# Patient Record
Sex: MALE | Race: WHITE | NOT HISPANIC OR LATINO | Employment: FULL TIME | URBAN - METROPOLITAN AREA
[De-identification: names, ages, dates, MRNs, and addresses within clinical notes are randomized per-mention and may not be internally consistent; named-entity substitution may affect disease eponyms.]

---

## 2017-07-07 ENCOUNTER — OFFICE VISIT (OUTPATIENT)
Dept: INTERNAL MEDICINE | Facility: CLINIC | Age: 42
End: 2017-07-07
Payer: COMMERCIAL

## 2017-07-07 VITALS
RESPIRATION RATE: 16 BRPM | BODY MASS INDEX: 29.84 KG/M2 | WEIGHT: 201.5 LBS | HEART RATE: 102 BPM | SYSTOLIC BLOOD PRESSURE: 132 MMHG | OXYGEN SATURATION: 96 % | HEIGHT: 69 IN | TEMPERATURE: 99 F | DIASTOLIC BLOOD PRESSURE: 89 MMHG

## 2017-07-07 DIAGNOSIS — G89.4 CHRONIC PAIN SYNDROME: Chronic | ICD-10-CM

## 2017-07-07 DIAGNOSIS — S63.630A SPRAIN OF INTERPHALANGEAL JOINT OF RIGHT INDEX FINGER, INITIAL ENCOUNTER: ICD-10-CM

## 2017-07-07 PROCEDURE — 99214 OFFICE O/P EST MOD 30 MIN: CPT | Mod: S$GLB,,, | Performed by: FAMILY MEDICINE

## 2017-07-07 PROCEDURE — 3008F BODY MASS INDEX DOCD: CPT | Mod: S$GLB,,, | Performed by: FAMILY MEDICINE

## 2017-07-07 PROCEDURE — 99999 PR PBB SHADOW E&M-EST. PATIENT-LVL III: CPT | Mod: PBBFAC,,, | Performed by: FAMILY MEDICINE

## 2017-07-07 RX ORDER — ACETAMINOPHEN 500 MG
500 TABLET ORAL EVERY 6 HOURS PRN
Status: ON HOLD | COMMUNITY
End: 2018-09-21 | Stop reason: HOSPADM

## 2017-07-07 RX ORDER — MELOXICAM 15 MG/1
15 TABLET ORAL DAILY
COMMUNITY
Start: 2017-06-29 | End: 2017-12-28 | Stop reason: ALTCHOICE

## 2017-07-07 RX ORDER — PREGABALIN 200 MG/1
200 CAPSULE ORAL 3 TIMES DAILY
COMMUNITY
End: 2017-12-28 | Stop reason: ALTCHOICE

## 2017-07-07 RX ORDER — METOPROLOL TARTRATE 50 MG/1
50 TABLET ORAL 2 TIMES DAILY
Status: ON HOLD | COMMUNITY
End: 2020-01-31 | Stop reason: HOSPADM

## 2017-07-07 RX ORDER — IBUPROFEN 200 MG
200 TABLET ORAL EVERY 6 HOURS PRN
Status: ON HOLD | COMMUNITY
End: 2018-09-21 | Stop reason: HOSPADM

## 2017-07-07 NOTE — LETTER
[facility] - Internal Medicine  [address]  [place] [address]  Phone: [phone]  Fax: [phone]   Patient: Luke Coley   MR Number: [id number]   YOB: 1975   Date of Visit: 7/7/2017        REFERRAL TO PAIN MANAGEMENT SPECIALIST / PAIN MANAGMENT CLINIC    WHAT IS A PAIN MANAGEMENT SPECIALIST? A PAIN MANAGEMENT SPECIALIST is a doctor who diagnoses and treats chronic pain syndromes.     WHAT IS A PAIN MANAGEMENT CLINIC? A PAIN MANAGEMENT CLINIC is a medical practice (doctors office) that is primarily engaged in the treatment of pain by prescribing medications such as narcotics. Pain management clinics in [place] must be licensed by the [place] Department of Health and [facility].    According to most recent information I have been given from [facility], there are seven licensed Pain Management Clinics in [place]. They are (in alphabetical order):  · [facility] [address] [place] [address] PH: [phone]  · [facility] [address] [place] [address] PH: [phone]  · [facility] [address] [place] [address] PH: [phone]  · [facility] [address] [place] [address] PH: [phone]  · [facility] [place] [address] [address] PH: [phone]  · [facility] [address] [facility] [address] [place] [address] PH: [phone]  · [facility] [address] [place] [address] PH: [phone]    NOTE: THE ABOVE INFORMATION MAY NOT BE ACCURATE OR UP TO DATE. YOU SHOULD CONTACT [facility] OR THE INDIVIDUAL OFFICES TO VERIFY THE CORRECT INFORMATION.                                  PAIN MANAGEMENT SPECIALIST THAT ARE  NOT AT [facility] PAIN MANAGEMENT CLINICS INCLUDE:    · [facility]: [phone] [place] [address] -Dr. Varghese  · [facility] [place]: [phone] [facility] [place]  [address] -Dr. Wasserman  · [facility]: [phone] [place] [address] -Dr. Cadena  · [facility]: [phone] [address] [place] [address] -Dr. Pinto -Dr. MILAGRO Urbina -Dr. MILAGRO Ferguson -Dr. LUZ Ferguson -Dr. Nunes  · [facility]: [phone] [place] [address] -Dr. Kendrick -Dr. Mcneal  · [facility]: [phone] [place] [address] -Dr. Anderson -Dr. Nascimento -Dr. Patel -Dr. Rehman -Dr. Ham -Dr. Horne  · Dr. Altaf Bonner: [phone] [place] [address]  · Dr. Aldair Arnett: [phone] [place] [address]  · Dr. Keith Ramsey: [phone] [place] [address]  · Dr. Audi Pimentel: [phone] [place] [address]    NOTE: THE ABOVE INFORMATION MAY NOT BE ACCURATE OR UP TO DATE. YOU SHOULD CONTACT THE INDIVIDUAL OFFICES TO VERIFY THE CORRECT INFORMATION.    IMPORTANT INFORMATION:  It is medically necessary for you to see a pain management specialist for proper diagnosis and treatment, because I am unable to meet your pain management needs. It is very important that you call to schedule an appointment to see this specialist without delay.    Failing to see this specialist may result in a FAILURE TO DIAGNOSE or DELAY IN DIAGNOSIS of a SERIOUS MEDICAL CONDITION or a FAILURE TO TREAT, a DELAY IN TREATMENT, or IMPROPER TREATMENT of a SERIOUS MEDICAL CONDITION.    WHAT TO DO NOW:  Call the pain management specialist or pain management clinic of your choice to schedule an appointment as soon as possible.

## 2017-07-07 NOTE — ASSESSMENT & PLAN NOTE
This problem is NEW TO ME. Based on the report of the patient and information available to me at present, this condition REQUIRES FURTHER WORK-UP.

## 2017-08-10 NOTE — PROGRESS NOTES
"CHIEF COMPLAINT  Finger Injury      HISTORY OF PRESENT ILLNESS     Problem List Items Addressed This Visit     Sprain of interphalangeal joint of right index finger    Chronic pain syndrome (Chronic)      Other Visit Diagnoses    None.       PROBLEM/CONDITION: Finger pain  STATUS: This problem is NEW TO ME. Quality described as an aching pain. Location is distal interphalangeal joint of right index finger. Onset was a few days ago, after accidentally "jamming" his finger. Severity of pain described as moderate to moderately severe. Exacerbating factors include range of motion. Alleviating factors include meloxicam 15 mg. On exam his finger demonstrates no is no joint inflammation, deformity, instability, or apparent tendon dysfunction. Strength and gross sensation are intact. ROM is normal.  PLAN: We discussed differential diagnosis and treatment options. It was agreed to treat this problem with a neutral position finger splint (which he will obtain at pharmacy), and use the prescription medication meloxicam 15 mg daily as needed. (He understands he should not use over-the-counter NSAIDs concurrently with meloxicam.) He may use cool/cold packs as needed. If this fails to resolve the problem over the next couple of weeks, I recommend consultation with orthopedic surgeon/hand specialist.    PROBLEM/CONDITION:  Chronic pain syndrome  STATUS: This problem is NEW TO ME. He reports other chronic low back pain, which he relates to degenerative disc disease. He is requesting that I provide him pain management services. It is noted that he volunteered no history of recreational drug use or chemical dependency, and he denied such history on direct questioning. However, it is also noted that his recent emergency department records reflect recent hospitalization for polysubstance abuse.   PLAN: I explained to him that given the complexity of his chronic pain condition, he needed to be under the care of a chronic pain " "specialist, and he could discuss that further with his primary care physician.     REVIEW OF SYSTEMS  CONSTITUTIONAL: No fever reported.  CARDIOVASCULAR: No chest pain reported.  PULMONARY: No trouble breathing reported.     PHYSICAL EXAM  Vitals:    07/07/17 1325   BP: 132/89   BP Location: Right arm   Patient Position: Sitting   BP Method: Manual   Pulse: 102   Resp: 16   Temp: 99 °F (37.2 °C)   TempSrc: Oral   SpO2: 96%   Weight: 91.4 kg (201 lb 8 oz)   Height: 5' 9" (1.753 m)     CONSTITUTIONAL: Vital signs (BP, P, T, RR, et al) noted. No apparent distress. Does not appear acutely ill or septic. Appears adequately hydrated.  HEENT: External ENT grossly unremarkable. Hearing grossly intact. Oropharynx moist.  PULM: Lungs clear. Breathing unlabored.  HEART: Auscultation reveals regular rate and rhythm without murmur, gallop or rub.  DERM: Skin warm and moist with normal turgor.  NEURO: There are no gross focal motor deficits or gross deficits of cranial nerves III-XII.  PSYCHIATRIC: Alert and oriented x 3. Mood is grossly neutral. Affect appropriate. Judgment and insight not grossly compromised.  MUSCULOSKELETAL: Grossly normal stance and gait.     PAST MEDICAL HISTORY, FAMILY HISTORY, SOCIAL HISTORY, CURRENT MEDICATION LIST, and ALLERGY LIST reviewed by me (JAYSON Estrada MD) and are updated consistent with the patient's report.    ASSESSMENT and PLAN  Sprain of interphalangeal joint of right index finger, initial encounter    Chronic pain syndrome      Medication List with Changes/Refills   Current Medications    ACETAMINOPHEN (TYLENOL) 500 MG TABLET    Take 500 mg by mouth every 6 (six) hours as needed for Pain.    ALUMINUM-MAGNESIUM HYDROXIDE-SIMETHICONE (MAALOX) 200-200-20 MG/5 ML SUSP    Take 30 mLs by mouth daily as needed.    IBUPROFEN (ADVIL,MOTRIN) 200 MG TABLET    Take 200 mg by mouth every 6 (six) hours as needed for Pain.    MELOXICAM (MOBIC) 15 MG TABLET    Take 15 mg by mouth once " daily.    METOPROLOL TARTRATE (LOPRESSOR) 50 MG TABLET    Take 50 mg by mouth 2 (two) times daily.    PREGABALIN (LYRICA) 200 MG CAP    Take 200 mg by mouth 3 (three) times daily.   Discontinued Medications    ACETAMINOPHEN (TYLENOL) 325 MG TABLET    Take 325 mg by mouth every 6 (six) hours as needed for Pain. 2 TABLETS (650 MG) EVERY 6 HOURS PRN    CLONIDINE (CATAPRES) 0.1 MG TABLET    Take 0.1 mg by mouth 2 (two) times daily as needed. Twice daily prn for systolic blood pressure  <160/diastolic pressure >100 notify MD    HALOPERIDOL (HALDOL) 5 MG TABLET    Take 5 mg by mouth every 6 (six) hours as needed. Po or IM prn psychosis if pt refuses po may give IM    HYDROXYZINE PAMOATE (VISTARIL) 50 MG CAP    Take 50 mg by mouth every 8 (eight) hours as needed. Prn anxiety    IBUPROFEN (ADVIL,MOTRIN) 400 MG TABLET    Take 400 mg by mouth every 6 (six) hours as needed for Other.    LOPERAMIDE (IMODIUM) 2 MG CAPSULE    Take 2 mg by mouth 4 (four) times daily as needed for Diarrhea. 2 tablets for 1st loose stool followed by 1 tablet for each loose stool no more than 8 tablets in 24 hours    LORAZEPAM (ATIVAN) 1 MG TABLET    Take 1 mg by mouth every 6 (six) hours as needed for Anxiety.    MAGNESIUM HYDROXIDE 400 MG/5 ML (MILK OF MAGNESIA) 400 MG/5 ML SUSP    Take 30 mLs by mouth daily as needed.    ONDANSETRON (ZOFRAN) 4 MG TABLET    Take 8 mg by mouth every 8 (eight) hours as needed for Nausea. Prn nausea/vomiting    OXCARBAZEPINE (TRILEPTAL) 150 MG TAB    Take 150 mg by mouth 2 (two) times daily.    PREGABALIN (LYRICA) 300 MG CAP    Take 1 capsule (300 mg total) by mouth 2 (two) times daily.    QUETIAPINE (SEROQUEL) 100 MG TAB    Take 100 mg by mouth every evening.    TRAZODONE (DESYREL) 50 MG TABLET    Take 50 mg by mouth nightly as needed for Insomnia.    UNABLE TO FIND    every 6 (six) hours as needed. BENADRYL 50 MG PO,HALDOL 5 MG PO +ATIVAN 2MG EVERY 6 HOURS PRN PSYCHOSIS, IF PATIENT REFUSES MAY GIVE IM       Return  if symptoms worsen or fail to improve.    ABOUT THIS DOCUMENTATION:  1. The order of the conditions listed in the HPI is one of convenience and does not necessarily reflect the chronology of the appointment, nor the relative importance of a condition. It is possible that additional description or status details about condition(s) may be found elsewhere in the documentation for today's encounter.  2. Documentation entered by me for this encounter was done in part using speech-recognition technology. Although I have made an effort to ensure accuracy, malapropisms may exist and should be interpreted in context.                        -JAYSON Estrada MD    There are no Patient Instructions on file for this visit.

## 2017-12-28 ENCOUNTER — HOSPITAL ENCOUNTER (EMERGENCY)
Facility: HOSPITAL | Age: 42
Discharge: HOME | End: 2017-12-28
Attending: EMERGENCY MEDICINE
Payer: COMMERCIAL

## 2017-12-28 VITALS
HEIGHT: 71 IN | SYSTOLIC BLOOD PRESSURE: 129 MMHG | BODY MASS INDEX: 25.9 KG/M2 | OXYGEN SATURATION: 100 % | HEART RATE: 80 BPM | WEIGHT: 185 LBS | DIASTOLIC BLOOD PRESSURE: 87 MMHG | RESPIRATION RATE: 20 BRPM | TEMPERATURE: 98 F

## 2017-12-28 DIAGNOSIS — R45.851 SUICIDAL IDEATIONS: ICD-10-CM

## 2017-12-28 DIAGNOSIS — F10.10 ALCOHOL ABUSE: Primary | ICD-10-CM

## 2017-12-28 LAB
ALBUMIN SERPL BCP-MCNC: 3.6 G/DL
ALP SERPL-CCNC: 62 U/L
ALT SERPL W/O P-5'-P-CCNC: 87 U/L
AMPHET+METHAMPHET UR QL: NEGATIVE
ANION GAP SERPL CALC-SCNC: 14 MMOL/L
APAP SERPL-MCNC: <3 UG/ML
AST SERPL-CCNC: 69 U/L
BARBITURATES UR QL SCN>200 NG/ML: NEGATIVE
BASOPHILS # BLD AUTO: 0.01 K/UL
BASOPHILS NFR BLD: 0.1 %
BENZODIAZ UR QL SCN>200 NG/ML: NEGATIVE
BILIRUB SERPL-MCNC: 0.4 MG/DL
BILIRUB UR QL STRIP: NEGATIVE
BUN SERPL-MCNC: 12 MG/DL
BZE UR QL SCN: NEGATIVE
CALCIUM SERPL-MCNC: 9.4 MG/DL
CANNABINOIDS UR QL SCN: NEGATIVE
CHLORIDE SERPL-SCNC: 105 MMOL/L
CLARITY UR: CLEAR
CO2 SERPL-SCNC: 24 MMOL/L
COLOR UR: YELLOW
CREAT SERPL-MCNC: 0.8 MG/DL
CREAT UR-MCNC: 11.6 MG/DL
DIFFERENTIAL METHOD: ABNORMAL
EOSINOPHIL # BLD AUTO: 0 K/UL
EOSINOPHIL NFR BLD: 0.1 %
ERYTHROCYTE [DISTWIDTH] IN BLOOD BY AUTOMATED COUNT: 13.5 %
EST. GFR  (AFRICAN AMERICAN): >60 ML/MIN/1.73 M^2
EST. GFR  (NON AFRICAN AMERICAN): >60 ML/MIN/1.73 M^2
ETHANOL SERPL-MCNC: 58 MG/DL
GLUCOSE SERPL-MCNC: 100 MG/DL
GLUCOSE UR QL STRIP: NEGATIVE
HCT VFR BLD AUTO: 38 %
HGB BLD-MCNC: 13.6 G/DL
HGB UR QL STRIP: NEGATIVE
KETONES UR QL STRIP: NEGATIVE
LEUKOCYTE ESTERASE UR QL STRIP: NEGATIVE
LYMPHOCYTES # BLD AUTO: 1 K/UL
LYMPHOCYTES NFR BLD: 8.3 %
MCH RBC QN AUTO: 32.2 PG
MCHC RBC AUTO-ENTMCNC: 35.8 G/DL
MCV RBC AUTO: 90 FL
METHADONE UR QL SCN>300 NG/ML: NEGATIVE
MONOCYTES # BLD AUTO: 0.6 K/UL
MONOCYTES NFR BLD: 5.1 %
NEUTROPHILS # BLD AUTO: 10.7 K/UL
NEUTROPHILS NFR BLD: 86.4 %
NITRITE UR QL STRIP: NEGATIVE
OPIATES UR QL SCN: NEGATIVE
PCP UR QL SCN>25 NG/ML: NEGATIVE
PH UR STRIP: 7 [PH] (ref 5–8)
PLATELET # BLD AUTO: 134 K/UL
PMV BLD AUTO: 11.6 FL
POTASSIUM SERPL-SCNC: 4 MMOL/L
PROT SERPL-MCNC: 7.1 G/DL
PROT UR QL STRIP: NEGATIVE
RBC # BLD AUTO: 4.23 M/UL
SODIUM SERPL-SCNC: 143 MMOL/L
SP GR UR STRIP: <=1.005 (ref 1–1.03)
TOXICOLOGY INFORMATION: ABNORMAL
TSH SERPL DL<=0.005 MIU/L-ACNC: 0.88 UIU/ML
URN SPEC COLLECT METH UR: ABNORMAL
UROBILINOGEN UR STRIP-ACNC: NEGATIVE EU/DL
WBC # BLD AUTO: 12.36 K/UL

## 2017-12-28 PROCEDURE — 80307 DRUG TEST PRSMV CHEM ANLYZR: CPT

## 2017-12-28 PROCEDURE — 80320 DRUG SCREEN QUANTALCOHOLS: CPT

## 2017-12-28 PROCEDURE — 99285 EMERGENCY DEPT VISIT HI MDM: CPT

## 2017-12-28 PROCEDURE — 25000003 PHARM REV CODE 250: Performed by: EMERGENCY MEDICINE

## 2017-12-28 PROCEDURE — G0425 INPT/ED TELECONSULT30: HCPCS | Mod: GT,,, | Performed by: PSYCHIATRY & NEUROLOGY

## 2017-12-28 PROCEDURE — 85025 COMPLETE CBC W/AUTO DIFF WBC: CPT

## 2017-12-28 PROCEDURE — 80053 COMPREHEN METABOLIC PANEL: CPT

## 2017-12-28 PROCEDURE — 81003 URINALYSIS AUTO W/O SCOPE: CPT

## 2017-12-28 PROCEDURE — 80329 ANALGESICS NON-OPIOID 1 OR 2: CPT

## 2017-12-28 PROCEDURE — 84443 ASSAY THYROID STIM HORMONE: CPT

## 2017-12-28 RX ORDER — MIRTAZAPINE 15 MG/1
15 TABLET, FILM COATED ORAL NIGHTLY
Status: DISCONTINUED | OUTPATIENT
Start: 2017-12-28 | End: 2017-12-28 | Stop reason: HOSPADM

## 2017-12-28 RX ORDER — DULOXETIN HYDROCHLORIDE 60 MG/1
60 CAPSULE, DELAYED RELEASE ORAL
Status: ON HOLD | COMMUNITY
Start: 2017-11-22 | End: 2018-09-21 | Stop reason: HOSPADM

## 2017-12-28 RX ORDER — DULOXETIN HYDROCHLORIDE 30 MG/1
30 CAPSULE, DELAYED RELEASE ORAL 2 TIMES DAILY
Status: DISCONTINUED | OUTPATIENT
Start: 2017-12-28 | End: 2017-12-28 | Stop reason: HOSPADM

## 2017-12-28 RX ORDER — GABAPENTIN 100 MG/1
100 CAPSULE ORAL 3 TIMES DAILY
Status: DISCONTINUED | OUTPATIENT
Start: 2017-12-28 | End: 2017-12-28 | Stop reason: HOSPADM

## 2017-12-28 RX ORDER — DICYCLOMINE HYDROCHLORIDE 20 MG/1
20 TABLET ORAL
Status: ON HOLD | COMMUNITY
Start: 2017-11-22 | End: 2018-09-21 | Stop reason: HOSPADM

## 2017-12-28 RX ORDER — PREGABALIN 200 MG/1
200 CAPSULE ORAL
Status: ON HOLD | COMMUNITY
Start: 2017-11-22 | End: 2018-09-21 | Stop reason: HOSPADM

## 2017-12-28 RX ADMIN — DULOXETINE 30 MG: 30 CAPSULE, DELAYED RELEASE ORAL at 09:12

## 2017-12-28 RX ADMIN — GABAPENTIN 100 MG: 100 CAPSULE ORAL at 06:12

## 2017-12-28 RX ADMIN — GABAPENTIN 100 MG: 100 CAPSULE ORAL at 03:12

## 2017-12-28 NOTE — ED NOTES
Pt resting in bed, side rails up x 2, sitter at bedside with direct visualization of pt. Sitter filling out 15 minute flow sheet. NAD at this time. Will continue to monitor.
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
## 2017-12-28 NOTE — ED NOTES
Attempted to notify Leidy, patient's emergency contact of patient's admission to [facility]; no answer x 2; unable to leave message.

## 2017-12-28 NOTE — ED NOTES
Pt's room secured per protocol. Pt's belongings secured and pt placed in grey gown and yellow socks. Pt being directly monitored by payton Ventura at this time. Will continue to monitor pt.

## 2017-12-28 NOTE — ED NOTES
Pt's initial PEC was accidentally voided by copy stamp. Dr Poe had completed his shift and Dr. Land was now on for the ED doctor. He was notified and he then assessed the pt and a new valid PEC was done.

## 2017-12-28 NOTE — ED NOTES
Patient identifiers verified and correct for Luke Coley.    LOC: The patient is awake, alert and aware of environment with an appropriate affect, the patient is oriented x 3 and speaking appropriately.  APPEARANCE: Patient resting comfortably and in no acute distress, patient is clean and well groomed, patient's clothing is properly fastened.  SKIN: The skin is warm and dry, color consistent with ethnicity, patient has normal skin turgor and moist mucus membranes, skin intact, no breakdown or bruising noted.  MUSCULOSKELETAL: Patient moving all extremities spontaneously.  RESPIRATORY: Airway is open and patent, respirations are spontaneous.  CARDIAC: Patient has a normal rate, no periphreal edema noted, capillary refill < 3 seconds.  ABDOMEN: Soft and non tender to palpation.

## 2017-12-28 NOTE — ED NOTES
Pt belongings collected:  Blue back pack consist of paperwork, belt and phone   Large grey duffle bag consist of multiple clothing  Black bag has toiletries  Tennis shoes  Hat, pants, shirt, and jacket  Medications: robaxin,2 bottles of metoprolol,one empty bottle of lexapro
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
## 2017-12-29 NOTE — ED NOTES
Patient transferred from to [facility] by charge nurse at this time. Patient escorted out of facility with [employer] transporters and security.

## 2018-09-17 PROBLEM — F32.A DEPRESSION: Status: ACTIVE | Noted: 2018-09-17

## 2018-09-18 PROBLEM — F17.200 TOBACCO USE DISORDER: Status: ACTIVE | Noted: 2018-09-18

## 2018-09-18 PROBLEM — D64.9 ANEMIA: Status: ACTIVE | Noted: 2018-09-18

## 2018-09-18 PROBLEM — R74.8 ELEVATED LIVER ENZYMES: Status: ACTIVE | Noted: 2018-09-18

## 2020-01-24 ENCOUNTER — HOSPITAL ENCOUNTER (INPATIENT)
Facility: HOSPITAL | Age: 45
LOS: 7 days | Discharge: HOME | DRG: 064 | End: 2020-01-31
Attending: EMERGENCY MEDICINE | Admitting: PSYCHIATRY & NEUROLOGY
Payer: COMMERCIAL

## 2020-01-24 DIAGNOSIS — F10.939 ALCOHOL WITHDRAWAL SYNDROME WITH COMPLICATION: ICD-10-CM

## 2020-01-24 DIAGNOSIS — I63.411 EMBOLIC STROKE INVOLVING RIGHT MIDDLE CEREBRAL ARTERY: ICD-10-CM

## 2020-01-24 DIAGNOSIS — G93.6 CYTOTOXIC CEREBRAL EDEMA: ICD-10-CM

## 2020-01-24 DIAGNOSIS — R00.1 BRADYCARDIA: ICD-10-CM

## 2020-01-24 DIAGNOSIS — F19.94 SUBSTANCE INDUCED MOOD DISORDER: ICD-10-CM

## 2020-01-24 DIAGNOSIS — F41.1 GENERALIZED ANXIETY DISORDER: ICD-10-CM

## 2020-01-24 DIAGNOSIS — G44.1 OTHER VASCULAR HEADACHE: Primary | ICD-10-CM

## 2020-01-24 DIAGNOSIS — I63.9 STROKE: ICD-10-CM

## 2020-01-24 DIAGNOSIS — F10.20 ALCOHOL USE DISORDER, SEVERE, DEPENDENCE: ICD-10-CM

## 2020-01-24 DIAGNOSIS — F11.20 OPIOID USE DISORDER, SEVERE, ON MAINTENANCE THERAPY, DEPENDENCE: ICD-10-CM

## 2020-01-24 PROBLEM — R51.9 CEPHALALGIA: Status: ACTIVE | Noted: 2020-01-24

## 2020-01-24 PROBLEM — F19.10 DRUG ABUSE: Status: ACTIVE | Noted: 2020-01-24

## 2020-01-24 PROBLEM — I10 ESSENTIAL HYPERTENSION: Status: ACTIVE | Noted: 2020-01-24

## 2020-01-24 LAB
ALBUMIN SERPL BCP-MCNC: 4 G/DL (ref 3.5–5.2)
ALBUMIN SERPL BCP-MCNC: 4.2 G/DL (ref 3.5–5.2)
ALP SERPL-CCNC: 66 U/L (ref 55–135)
ALP SERPL-CCNC: 66 U/L (ref 55–135)
ALT SERPL W/O P-5'-P-CCNC: 55 U/L (ref 10–44)
ALT SERPL W/O P-5'-P-CCNC: 61 U/L (ref 10–44)
AMPHET+METHAMPHET UR QL: NEGATIVE
ANION GAP SERPL CALC-SCNC: 14 MMOL/L (ref 8–16)
ANION GAP SERPL CALC-SCNC: 9 MMOL/L (ref 8–16)
ASCENDING AORTA: 3.76 CM
AST SERPL-CCNC: 106 U/L (ref 10–40)
AST SERPL-CCNC: 122 U/L (ref 10–40)
AV INDEX (PROSTH): 1.08
AV MEAN GRADIENT: 5 MMHG
AV PEAK GRADIENT: 7 MMHG
AV VALVE AREA: 3.46 CM2
AV VELOCITY RATIO: 0.89
BACTERIA #/AREA URNS AUTO: NORMAL /HPF
BARBITURATES UR QL SCN>200 NG/ML: NORMAL
BASOPHILS # BLD AUTO: 0.02 K/UL (ref 0–0.2)
BASOPHILS NFR BLD: 0.1 % (ref 0–1.9)
BENZODIAZ UR QL SCN>200 NG/ML: NEGATIVE
BILIRUB SERPL-MCNC: 0.6 MG/DL (ref 0.1–1)
BILIRUB SERPL-MCNC: 0.8 MG/DL (ref 0.1–1)
BILIRUB UR QL STRIP: NEGATIVE
BSA FOR ECHO PROCEDURE: 1.98 M2
BUN SERPL-MCNC: 24 MG/DL (ref 6–20)
BUN SERPL-MCNC: 31 MG/DL (ref 6–20)
BZE UR QL SCN: NEGATIVE
CALCIUM SERPL-MCNC: 8.7 MG/DL (ref 8.7–10.5)
CALCIUM SERPL-MCNC: 9 MG/DL (ref 8.7–10.5)
CANNABINOIDS UR QL SCN: NEGATIVE
CHLORIDE SERPL-SCNC: 105 MMOL/L (ref 95–110)
CHLORIDE SERPL-SCNC: 99 MMOL/L (ref 95–110)
CHOLEST SERPL-MCNC: 128 MG/DL (ref 120–199)
CHOLEST/HDLC SERPL: 2.4 {RATIO} (ref 2–5)
CLARITY UR REFRACT.AUTO: CLEAR
CO2 SERPL-SCNC: 25 MMOL/L (ref 23–29)
CO2 SERPL-SCNC: 28 MMOL/L (ref 23–29)
COLOR UR AUTO: YELLOW
CREAT SERPL-MCNC: 1.6 MG/DL (ref 0.5–1.4)
CREAT SERPL-MCNC: 2.5 MG/DL (ref 0.5–1.4)
CREAT UR-MCNC: 78 MG/DL (ref 23–375)
CV ECHO LV RWT: 0.49 CM
DIFFERENTIAL METHOD: ABNORMAL
DOP CALC AO PEAK VEL: 1.28 M/S
DOP CALC AO VTI: 21.76 CM
DOP CALC LVOT AREA: 3.2 CM2
DOP CALC LVOT DIAMETER: 2.02 CM
DOP CALC LVOT PEAK VEL: 1.14 M/S
DOP CALC LVOT STROKE VOLUME: 75.24 CM3
DOP CALCLVOT PEAK VEL VTI: 23.49 CM
E WAVE DECELERATION TIME: 224.16 MSEC
E/A RATIO: 0.96
E/E' RATIO: 7.58 M/S
ECHO LV POSTERIOR WALL: 1.01 CM (ref 0.6–1.1)
EOSINOPHIL # BLD AUTO: 0 K/UL (ref 0–0.5)
EOSINOPHIL NFR BLD: 0 % (ref 0–8)
ERYTHROCYTE [DISTWIDTH] IN BLOOD BY AUTOMATED COUNT: 13.6 % (ref 11.5–14.5)
EST. GFR  (AFRICAN AMERICAN): 34.6 ML/MIN/1.73 M^2
EST. GFR  (AFRICAN AMERICAN): 59.3 ML/MIN/1.73 M^2
EST. GFR  (NON AFRICAN AMERICAN): 29.9 ML/MIN/1.73 M^2
EST. GFR  (NON AFRICAN AMERICAN): 51.3 ML/MIN/1.73 M^2
ESTIMATED AVG GLUCOSE: 103 MG/DL (ref 68–131)
ETHANOL SERPL-MCNC: <10 MG/DL
FRACTIONAL SHORTENING: 22 % (ref 28–44)
GLUCOSE SERPL-MCNC: 113 MG/DL (ref 70–110)
GLUCOSE SERPL-MCNC: 133 MG/DL (ref 70–110)
GLUCOSE UR QL STRIP: NEGATIVE
HBA1C MFR BLD HPLC: 5.2 % (ref 4–5.6)
HCT VFR BLD AUTO: 39.5 % (ref 40–54)
HDLC SERPL-MCNC: 53 MG/DL (ref 40–75)
HDLC SERPL: 41.4 % (ref 20–50)
HGB BLD-MCNC: 13.3 G/DL (ref 14–18)
HGB UR QL STRIP: ABNORMAL
HYALINE CASTS UR QL AUTO: 0 /LPF
IMM GRANULOCYTES # BLD AUTO: 0.04 K/UL (ref 0–0.04)
IMM GRANULOCYTES NFR BLD AUTO: 0.3 % (ref 0–0.5)
INR PPP: 1.1 (ref 0.8–1.2)
INTERVENTRICULAR SEPTUM: 0.75 CM (ref 0.6–1.1)
KETONES UR QL STRIP: NEGATIVE
LDLC SERPL CALC-MCNC: 54 MG/DL (ref 63–159)
LEFT ATRIUM SIZE: 4.28 CM
LEFT INTERNAL DIMENSION IN SYSTOLE: 3.22 CM (ref 2.1–4)
LEFT VENTRICLE DIASTOLIC VOLUME INDEX: 38.28 ML/M2
LEFT VENTRICLE DIASTOLIC VOLUME: 74.82 ML
LEFT VENTRICLE MASS INDEX: 57 G/M2
LEFT VENTRICLE SYSTOLIC VOLUME INDEX: 21.2 ML/M2
LEFT VENTRICLE SYSTOLIC VOLUME: 41.43 ML
LEFT VENTRICULAR INTERNAL DIMENSION IN DIASTOLE: 4.11 CM (ref 3.5–6)
LEFT VENTRICULAR MASS: 111.12 G
LEUKOCYTE ESTERASE UR QL STRIP: NEGATIVE
LV LATERAL E/E' RATIO: 6.55 M/S
LV SEPTAL E/E' RATIO: 9 M/S
LYMPHOCYTES # BLD AUTO: 0.9 K/UL (ref 1–4.8)
LYMPHOCYTES NFR BLD: 6.5 % (ref 18–48)
MCH RBC QN AUTO: 31.7 PG (ref 27–31)
MCHC RBC AUTO-ENTMCNC: 33.7 G/DL (ref 32–36)
MCV RBC AUTO: 94 FL (ref 82–98)
METHADONE UR QL SCN>300 NG/ML: NEGATIVE
MICROSCOPIC COMMENT: NORMAL
MONOCYTES # BLD AUTO: 0.5 K/UL (ref 0.3–1)
MONOCYTES NFR BLD: 3.5 % (ref 4–15)
MV PEAK A VEL: 0.75 M/S
MV PEAK E VEL: 0.72 M/S
NEUTROPHILS # BLD AUTO: 12.2 K/UL (ref 1.8–7.7)
NEUTROPHILS NFR BLD: 89.6 % (ref 38–73)
NITRITE UR QL STRIP: NEGATIVE
NONHDLC SERPL-MCNC: 75 MG/DL
NRBC BLD-RTO: 0 /100 WBC
OPIATES UR QL SCN: NORMAL
PCP UR QL SCN>25 NG/ML: NEGATIVE
PH UR STRIP: 5 [PH] (ref 5–8)
PLATELET # BLD AUTO: 127 K/UL (ref 150–350)
PLATELET BLD QL SMEAR: ABNORMAL
PMV BLD AUTO: 10.7 FL (ref 9.2–12.9)
POCT GLUCOSE: 111 MG/DL (ref 70–110)
POTASSIUM SERPL-SCNC: 4.1 MMOL/L (ref 3.5–5.1)
POTASSIUM SERPL-SCNC: 4.2 MMOL/L (ref 3.5–5.1)
PROT SERPL-MCNC: 6.7 G/DL (ref 6–8.4)
PROT SERPL-MCNC: 7.1 G/DL (ref 6–8.4)
PROT UR QL STRIP: ABNORMAL
PROTHROMBIN TIME: 11.3 SEC (ref 9–12.5)
RA PRESSURE: 3 MMHG
RBC # BLD AUTO: 4.2 M/UL (ref 4.6–6.2)
RBC #/AREA URNS AUTO: 4 /HPF (ref 0–4)
RIGHT VENTRICULAR END-DIASTOLIC DIMENSION: 4.2 CM
RV TISSUE DOPPLER FREE WALL SYSTOLIC VELOCITY 1 (APICAL 4 CHAMBER VIEW): 14 CM/S
SINUS: 3.04 CM
SODIUM SERPL-SCNC: 138 MMOL/L (ref 136–145)
SODIUM SERPL-SCNC: 142 MMOL/L (ref 136–145)
SP GR UR STRIP: 1.01 (ref 1–1.03)
SQUAMOUS #/AREA URNS AUTO: 0 /HPF
STJ: 3.7 CM
TDI LATERAL: 0.11 M/S
TDI SEPTAL: 0.08 M/S
TDI: 0.1 M/S
TOXICOLOGY INFORMATION: NORMAL
TRICUSPID ANNULAR PLANE SYSTOLIC EXCURSION: 2.53 CM
TRIGL SERPL-MCNC: 105 MG/DL (ref 30–150)
TSH SERPL DL<=0.005 MIU/L-ACNC: 0.62 UIU/ML (ref 0.4–4)
URN SPEC COLLECT METH UR: ABNORMAL
WBC # BLD AUTO: 13.61 K/UL (ref 3.9–12.7)
WBC #/AREA URNS AUTO: 2 /HPF (ref 0–5)

## 2020-01-24 PROCEDURE — 85025 COMPLETE CBC W/AUTO DIFF WBC: CPT

## 2020-01-24 PROCEDURE — 63600175 PHARM REV CODE 636 W HCPCS: Performed by: PSYCHIATRY & NEUROLOGY

## 2020-01-24 PROCEDURE — 63600175 PHARM REV CODE 636 W HCPCS: Performed by: EMERGENCY MEDICINE

## 2020-01-24 PROCEDURE — 63600175 PHARM REV CODE 636 W HCPCS: Performed by: NURSE PRACTITIONER

## 2020-01-24 PROCEDURE — 84443 ASSAY THYROID STIM HORMONE: CPT

## 2020-01-24 PROCEDURE — 25000003 PHARM REV CODE 250: Performed by: PHYSICIAN ASSISTANT

## 2020-01-24 PROCEDURE — 63600175 PHARM REV CODE 636 W HCPCS: Performed by: PHYSICIAN ASSISTANT

## 2020-01-24 PROCEDURE — 36415 COLL VENOUS BLD VENIPUNCTURE: CPT

## 2020-01-24 PROCEDURE — 99223 1ST HOSP IP/OBS HIGH 75: CPT | Mod: ,,, | Performed by: PSYCHIATRY & NEUROLOGY

## 2020-01-24 PROCEDURE — 99291 PR CRITICAL CARE, E/M 30-74 MINUTES: ICD-10-PCS | Mod: ,,, | Performed by: EMERGENCY MEDICINE

## 2020-01-24 PROCEDURE — 93010 EKG 12-LEAD: ICD-10-PCS | Mod: ,,, | Performed by: INTERNAL MEDICINE

## 2020-01-24 PROCEDURE — 25000003 PHARM REV CODE 250: Performed by: NURSE PRACTITIONER

## 2020-01-24 PROCEDURE — 93010 ELECTROCARDIOGRAM REPORT: CPT | Mod: ,,, | Performed by: INTERNAL MEDICINE

## 2020-01-24 PROCEDURE — 80320 DRUG SCREEN QUANTALCOHOLS: CPT

## 2020-01-24 PROCEDURE — 99291 CRITICAL CARE FIRST HOUR: CPT | Mod: 25

## 2020-01-24 PROCEDURE — 96375 TX/PRO/DX INJ NEW DRUG ADDON: CPT

## 2020-01-24 PROCEDURE — 96365 THER/PROPH/DIAG IV INF INIT: CPT

## 2020-01-24 PROCEDURE — 99223 PR INITIAL HOSPITAL CARE,LEVL III: ICD-10-PCS | Mod: ,,, | Performed by: PSYCHIATRY & NEUROLOGY

## 2020-01-24 PROCEDURE — 92610 EVALUATE SWALLOWING FUNCTION: CPT

## 2020-01-24 PROCEDURE — 82962 GLUCOSE BLOOD TEST: CPT

## 2020-01-24 PROCEDURE — 11000001 HC ACUTE MED/SURG PRIVATE ROOM

## 2020-01-24 PROCEDURE — 83036 HEMOGLOBIN GLYCOSYLATED A1C: CPT

## 2020-01-24 PROCEDURE — 25000003 PHARM REV CODE 250: Performed by: EMERGENCY MEDICINE

## 2020-01-24 PROCEDURE — 93005 ELECTROCARDIOGRAM TRACING: CPT

## 2020-01-24 PROCEDURE — 80053 COMPREHEN METABOLIC PANEL: CPT

## 2020-01-24 PROCEDURE — 80053 COMPREHEN METABOLIC PANEL: CPT | Mod: 91

## 2020-01-24 PROCEDURE — 81001 URINALYSIS AUTO W/SCOPE: CPT

## 2020-01-24 PROCEDURE — 80061 LIPID PANEL: CPT

## 2020-01-24 PROCEDURE — 99291 CRITICAL CARE FIRST HOUR: CPT | Mod: ,,, | Performed by: EMERGENCY MEDICINE

## 2020-01-24 PROCEDURE — 80307 DRUG TEST PRSMV CHEM ANLYZR: CPT

## 2020-01-24 PROCEDURE — 85610 PROTHROMBIN TIME: CPT

## 2020-01-24 RX ORDER — HEPARIN SODIUM 5000 [USP'U]/ML
5000 INJECTION, SOLUTION INTRAVENOUS; SUBCUTANEOUS EVERY 8 HOURS
Status: DISCONTINUED | OUTPATIENT
Start: 2020-01-24 | End: 2020-01-31 | Stop reason: HOSPADM

## 2020-01-24 RX ORDER — SODIUM CHLORIDE 9 MG/ML
INJECTION, SOLUTION INTRAVENOUS CONTINUOUS
Status: DISCONTINUED | OUTPATIENT
Start: 2020-01-24 | End: 2020-01-25

## 2020-01-24 RX ORDER — ACETAMINOPHEN 500 MG
500 TABLET ORAL EVERY 6 HOURS PRN
Status: DISCONTINUED | OUTPATIENT
Start: 2020-01-24 | End: 2020-01-24

## 2020-01-24 RX ORDER — LORAZEPAM 2 MG/ML
2 INJECTION INTRAMUSCULAR
Status: DISCONTINUED | OUTPATIENT
Start: 2020-01-24 | End: 2020-01-25

## 2020-01-24 RX ORDER — DIVALPROEX SODIUM 250 MG/1
500 TABLET, DELAYED RELEASE ORAL ONCE
Status: COMPLETED | OUTPATIENT
Start: 2020-01-24 | End: 2020-01-24

## 2020-01-24 RX ORDER — DEXAMETHASONE SODIUM PHOSPHATE 4 MG/ML
8 INJECTION, SOLUTION INTRA-ARTICULAR; INTRALESIONAL; INTRAMUSCULAR; INTRAVENOUS; SOFT TISSUE
Status: COMPLETED | OUTPATIENT
Start: 2020-01-24 | End: 2020-01-24

## 2020-01-24 RX ORDER — DULOXETIN HYDROCHLORIDE 30 MG/1
60 CAPSULE, DELAYED RELEASE ORAL 2 TIMES DAILY
Status: DISCONTINUED | OUTPATIENT
Start: 2020-01-24 | End: 2020-01-24

## 2020-01-24 RX ORDER — ASPIRIN 81 MG/1
81 TABLET ORAL DAILY
Status: DISCONTINUED | OUTPATIENT
Start: 2020-01-24 | End: 2020-01-24

## 2020-01-24 RX ORDER — GABAPENTIN 400 MG/1
800 CAPSULE ORAL 3 TIMES DAILY
Status: DISCONTINUED | OUTPATIENT
Start: 2020-01-24 | End: 2020-01-24

## 2020-01-24 RX ORDER — LAMOTRIGINE 100 MG/1
200 TABLET ORAL 2 TIMES DAILY
Status: DISCONTINUED | OUTPATIENT
Start: 2020-01-24 | End: 2020-01-24

## 2020-01-24 RX ORDER — KETOROLAC TROMETHAMINE 30 MG/ML
15 INJECTION, SOLUTION INTRAMUSCULAR; INTRAVENOUS
Status: COMPLETED | OUTPATIENT
Start: 2020-01-24 | End: 2020-01-24

## 2020-01-24 RX ORDER — ONDANSETRON 2 MG/ML
8 INJECTION INTRAMUSCULAR; INTRAVENOUS ONCE
Status: COMPLETED | OUTPATIENT
Start: 2020-01-24 | End: 2020-01-24

## 2020-01-24 RX ORDER — BUTALBITAL, ACETAMINOPHEN AND CAFFEINE 50; 325; 40 MG/1; MG/1; MG/1
1 TABLET ORAL ONCE
Status: COMPLETED | OUTPATIENT
Start: 2020-01-24 | End: 2020-01-24

## 2020-01-24 RX ORDER — THIAMINE HCL 100 MG
100 TABLET ORAL DAILY
Status: DISCONTINUED | OUTPATIENT
Start: 2020-01-24 | End: 2020-01-31 | Stop reason: HOSPADM

## 2020-01-24 RX ORDER — BUPROPION HYDROCHLORIDE 300 MG/1
300 TABLET ORAL DAILY
Status: DISCONTINUED | OUTPATIENT
Start: 2020-01-25 | End: 2020-01-31 | Stop reason: HOSPADM

## 2020-01-24 RX ORDER — ATORVASTATIN CALCIUM 10 MG/1
40 TABLET, FILM COATED ORAL DAILY
Status: DISCONTINUED | OUTPATIENT
Start: 2020-01-24 | End: 2020-01-24

## 2020-01-24 RX ORDER — DIAZEPAM 2 MG/1
2 TABLET ORAL ONCE
Status: COMPLETED | OUTPATIENT
Start: 2020-01-24 | End: 2020-01-24

## 2020-01-24 RX ORDER — ACETAMINOPHEN 500 MG
500 TABLET ORAL EVERY 4 HOURS PRN
Status: DISCONTINUED | OUTPATIENT
Start: 2020-01-24 | End: 2020-01-31 | Stop reason: HOSPADM

## 2020-01-24 RX ORDER — DIPHENHYDRAMINE HYDROCHLORIDE 50 MG/ML
12.5 INJECTION INTRAMUSCULAR; INTRAVENOUS
Status: COMPLETED | OUTPATIENT
Start: 2020-01-24 | End: 2020-01-24

## 2020-01-24 RX ORDER — DEXAMETHASONE SODIUM PHOSPHATE 4 MG/ML
8 INJECTION, SOLUTION INTRA-ARTICULAR; INTRALESIONAL; INTRAMUSCULAR; INTRAVENOUS; SOFT TISSUE
Status: DISCONTINUED | OUTPATIENT
Start: 2020-01-24 | End: 2020-01-24

## 2020-01-24 RX ORDER — MAGNESIUM SULFATE HEPTAHYDRATE 40 MG/ML
2 INJECTION, SOLUTION INTRAVENOUS
Status: COMPLETED | OUTPATIENT
Start: 2020-01-24 | End: 2020-01-24

## 2020-01-24 RX ORDER — SODIUM CHLORIDE 0.9 % (FLUSH) 0.9 %
10 SYRINGE (ML) INJECTION
Status: DISCONTINUED | OUTPATIENT
Start: 2020-01-24 | End: 2020-01-31 | Stop reason: HOSPADM

## 2020-01-24 RX ORDER — ASPIRIN 325 MG
325 TABLET ORAL DAILY
Status: DISCONTINUED | OUTPATIENT
Start: 2020-01-24 | End: 2020-01-31 | Stop reason: HOSPADM

## 2020-01-24 RX ORDER — DIAZEPAM 5 MG/1
5 TABLET ORAL EVERY 6 HOURS PRN
Status: DISCONTINUED | OUTPATIENT
Start: 2020-01-24 | End: 2020-01-25

## 2020-01-24 RX ORDER — METOCLOPRAMIDE HYDROCHLORIDE 5 MG/ML
10 INJECTION INTRAMUSCULAR; INTRAVENOUS
Status: COMPLETED | OUTPATIENT
Start: 2020-01-24 | End: 2020-01-24

## 2020-01-24 RX ORDER — OLANZAPINE 2.5 MG/1
10 TABLET ORAL NIGHTLY
Status: DISCONTINUED | OUTPATIENT
Start: 2020-01-24 | End: 2020-01-27

## 2020-01-24 RX ORDER — LABETALOL HCL 20 MG/4 ML
10 SYRINGE (ML) INTRAVENOUS
Status: DISCONTINUED | OUTPATIENT
Start: 2020-01-24 | End: 2020-01-31 | Stop reason: HOSPADM

## 2020-01-24 RX ORDER — DULOXETIN HYDROCHLORIDE 30 MG/1
60 CAPSULE, DELAYED RELEASE ORAL DAILY
Status: DISCONTINUED | OUTPATIENT
Start: 2020-01-25 | End: 2020-01-31 | Stop reason: HOSPADM

## 2020-01-24 RX ORDER — PREGABALIN 150 MG/1
300 CAPSULE ORAL 2 TIMES DAILY
Status: DISCONTINUED | OUTPATIENT
Start: 2020-01-24 | End: 2020-01-31 | Stop reason: HOSPADM

## 2020-01-24 RX ORDER — BUTALBITAL, ACETAMINOPHEN AND CAFFEINE 50; 325; 40 MG/1; MG/1; MG/1
1 TABLET ORAL
Status: COMPLETED | OUTPATIENT
Start: 2020-01-24 | End: 2020-01-24

## 2020-01-24 RX ORDER — MIRTAZAPINE 15 MG/1
15 TABLET, FILM COATED ORAL NIGHTLY
Status: DISCONTINUED | OUTPATIENT
Start: 2020-01-24 | End: 2020-01-24

## 2020-01-24 RX ADMIN — ACETAMINOPHEN 500 MG: 500 TABLET ORAL at 09:01

## 2020-01-24 RX ADMIN — ONDANSETRON 8 MG: 2 INJECTION INTRAMUSCULAR; INTRAVENOUS at 04:01

## 2020-01-24 RX ADMIN — ACETAMINOPHEN 500 MG: 500 TABLET ORAL at 04:01

## 2020-01-24 RX ADMIN — DIVALPROEX SODIUM 500 MG: 250 TABLET, DELAYED RELEASE ORAL at 06:01

## 2020-01-24 RX ADMIN — Medication 100 MG: at 11:01

## 2020-01-24 RX ADMIN — BUTALBITAL, ACETAMINOPHEN AND CAFFEINE 1 TABLET: 50; 325; 40 TABLET ORAL at 09:01

## 2020-01-24 RX ADMIN — BUTALBITAL, ACETAMINOPHEN AND CAFFEINE 1 TABLET: 50; 325; 40 TABLET ORAL at 03:01

## 2020-01-24 RX ADMIN — ASPIRIN 325 MG ORAL TABLET 325 MG: 325 PILL ORAL at 01:01

## 2020-01-24 RX ADMIN — SODIUM CHLORIDE: 0.9 INJECTION, SOLUTION INTRAVENOUS at 03:01

## 2020-01-24 RX ADMIN — METOCLOPRAMIDE 10 MG: 5 INJECTION, SOLUTION INTRAMUSCULAR; INTRAVENOUS at 06:01

## 2020-01-24 RX ADMIN — MAGNESIUM SULFATE IN WATER 2 G: 40 INJECTION, SOLUTION INTRAVENOUS at 11:01

## 2020-01-24 RX ADMIN — FOLIC ACID: 5 INJECTION, SOLUTION INTRAMUSCULAR; INTRAVENOUS; SUBCUTANEOUS at 11:01

## 2020-01-24 RX ADMIN — HEPARIN SODIUM 5000 UNITS: 5000 INJECTION INTRAVENOUS; SUBCUTANEOUS at 09:01

## 2020-01-24 RX ADMIN — DIAZEPAM 2 MG: 2 TABLET ORAL at 06:01

## 2020-01-24 RX ADMIN — DIPHENHYDRAMINE HYDROCHLORIDE 12.5 MG: 50 INJECTION, SOLUTION INTRAMUSCULAR; INTRAVENOUS at 06:01

## 2020-01-24 RX ADMIN — GABAPENTIN 800 MG: 400 CAPSULE ORAL at 02:01

## 2020-01-24 RX ADMIN — KETOROLAC TROMETHAMINE 15 MG: 30 INJECTION, SOLUTION INTRAMUSCULAR; INTRAVENOUS at 11:01

## 2020-01-24 RX ADMIN — HEPARIN SODIUM 5000 UNITS: 5000 INJECTION INTRAVENOUS; SUBCUTANEOUS at 01:01

## 2020-01-24 RX ADMIN — DEXAMETHASONE SODIUM PHOSPHATE 8 MG: 4 INJECTION, SOLUTION INTRA-ARTICULAR; INTRALESIONAL; INTRAMUSCULAR; INTRAVENOUS; SOFT TISSUE at 11:01

## 2020-01-24 RX ADMIN — OLANZAPINE 10 MG: 2.5 TABLET, FILM COATED ORAL at 08:01

## 2020-01-24 RX ADMIN — LORAZEPAM 2 MG: 2 INJECTION, SOLUTION INTRAMUSCULAR; INTRAVENOUS at 09:01

## 2020-01-24 RX ADMIN — PREGABALIN 300 MG: 150 CAPSULE ORAL at 08:01

## 2020-01-24 NOTE — ASSESSMENT & PLAN NOTE
Stroke risk factor  Patient on amlodipine at home  -170 on arrival  Holding home BP meds  SBP <220  Labetalol prn

## 2020-01-24 NOTE — ASSESSMENT & PLAN NOTE
Patient given multiple medications by ED staff for headache including, Fioricet, dexamethasone, benadryl, remeron, and ketorolac  Will monitor response and consider other agents if necessary

## 2020-01-24 NOTE — ASSESSMENT & PLAN NOTE
Stroke risk factor  Patient reports snorting small amount of drugs last night he believes was heroine  Tox positive for barbiturates and opiates  History of psychiatry hospitalization and alcohol/drug rehab in 2018

## 2020-01-24 NOTE — NURSING
Allison Gallegos made aware patients pain persists at 10/10.  Charge nurse Berna made aware of previous efforts and interventions performed to help with patients pain.

## 2020-01-24 NOTE — HPI
Luke Coley is a 44 y.o. male medical history of HTN, fibromyalgia, alcohol and drug abuse, suicidal ideation, elevated liver enzymes and bipolar disorder who presented to ED intoxicated.  Patient stated he was out with friends last night drinking alcohol and snorted a small amount of drugs (possibly heroin).  When asked about where he slept for the night, patient states he was looking for his car all night. He presented to the emergency room around 6:00 a.m. this morning.  Currently, the patient is complaining of confusion, dizziness, headache, left facial numbness, and left-sided numbness.  Due to complaints of headache, ED physician ordered CT head which revealed right parietal infarct, stroke team consult. Patient states he drinks once per week but chart review indicates that he has been to rehab for alcohol/drug abuse in 2018.

## 2020-01-24 NOTE — PLAN OF CARE
Problem: SLP Goal  Goal: SLP Goal  Description  Speech Language Pathology Goals  Goals expected to be met by 1/31/2020  1. Pt will tolerate a regular diet with thin liquids w/o overt S/S aspiration, I'ly  2. Pt will participate in further, objective assessment of cognitive-linguistic skills to determine ongoing POC needs  3. Educate Pt and family on S/S aspiration and aspiration precautions      Outcome: Ongoing, Progressing     SLP Bedside Swallow Study completed. No overt S/S aspiration seen with trials presented. Pt with c/o intense 10/10 headache.  REC: regular diet with thin liquids, medications whole ok, provided standard aspiration precautions. ST to follow up to complete cognitive-linguistic assessment to determine ongoing POC needs.  RN and MD team notified of findings and Pt c/o headache pain.    NED Haskins., [facility]-SLP  Speech-Language Pathology  Pager: [phone]  1/24/2020

## 2020-01-24 NOTE — ASSESSMENT & PLAN NOTE
Luke Coley is a 44 y.o. male medical history of HTN, fibromyalgia, alcohol abuse, suicidal ideation, elevated liver enzymes and bipolar disorder who presented to ED intoxicated. CT ordered in ED due to complaints of HA. CT with R parietal infarct. Admitting patient to stroke unit for further workup. Etiology unclear, currently ESUS.    Antithrombotics for secondary stroke prevention: Antiplatelets: Aspirin: 325 mg daily    Statins for secondary stroke prevention and hyperlipidemia, if present:   Statins: None: Reason: elevated liver enzymes    Aggressive risk factor modification: HTN, Smoking, alcohol and drug use     Rehab efforts: The patient has been evaluated by a stroke team provider and the therapy needs have been fully considered based off the presenting complaints and exam findings. The following therapy evaluations are needed: PT evaluate and treat, OT evaluate and treat, SLP evaluate and treat, PM&R evaluate for appropriate placement    Diagnostics ordered/pending: HgbA1C to assess blood glucose levels, MRA head to assess vasculature, MRA neck/arch to assess vasculature, MRI head without contrast to assess brain parenchyma, TTE to assess cardiac function/status     VTE prophylaxis: Heparin 5000 units SQ every 8 hours    BP parameters: Infarct: No intervention, SBP <220
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
## 2020-01-24 NOTE — ED NOTES
"LOC: The patient is awake, alert, and oriented to place, time, situation.  Speech is appropriate and clear.     APPEARANCE: Patient moving/grabbing head stating, "This is a bad migraine." Pt in no acute distress. Patient is clean and well groomed. Pt arrived with 1000ml bolus of NS per EMS.     SKIN: The skin is warm and dry; color consistent with ethnicity.  Patient has normal skin turgor and moist mucus membranes.  Skin intact; no breakdown or bruising noted.     MUSCULOSKELETAL: Patient moving upper and lower extremities without difficulty.  Denies weakness.     RESPIRATORY: Airway is open and patent. Respirations spontaneous, even, easy, and non-labored.  Patient has a normal effort and rate.  No accessory muscle use noted. Denies cough.     CARDIAC:  Normal rhythm and rate noted.  No peripheral edema noted. No complaints of chest pain.      ABDOMEN: Soft and non tender to palpation.  No distention noted. +Nausea     NEUROLOGIC: Eyes open spontaneously.  Behavior appropriate to situation.  Follows commands; facial expression symmetrical.  Purposeful motor response noted; normal sensation in all extremities. Pupils 4mm, brisk/ equal, round, reactive to light.        "

## 2020-01-24 NOTE — ASSESSMENT & PLAN NOTE
Area of cytotoxic cerebral edema identified when reviewing brain imaging in the territory of the R middle cerebral artery. There is mass effect associated with it. We will continue to monitor the patients clinical exam for any worsening of symptoms which may indicate expansion of the stroke or the area of the edema resulting in the clinical change. The pattern is suggestive of ESUS etiology.

## 2020-01-24 NOTE — NURSING
Pt. Identified via spelling of name and date of birth. Bubble study x 3 via 20 g in right ac performed with and without valsalva maneuver.   Tolerated procedure well.

## 2020-01-24 NOTE — ED TRIAGE NOTES
"Pt brought by EMS with c/o a severe migraine, nausea, and visual changes. Pt reports hx of migraines, and states "its a bad migraine." Pt recently on suboxone.   "

## 2020-01-24 NOTE — ASSESSMENT & PLAN NOTE
Presented to ED intoxicated  Monitoring for signs and symptoms of withdrawal  CIWA ordered  Will order PRN benzos if needed
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
## 2020-01-25 PROBLEM — G25.9: Status: ACTIVE | Noted: 2020-01-25

## 2020-01-25 LAB
ALBUMIN SERPL BCP-MCNC: 3.7 G/DL (ref 3.5–5.2)
ALP SERPL-CCNC: 60 U/L (ref 55–135)
ALT SERPL W/O P-5'-P-CCNC: 48 U/L (ref 10–44)
ANION GAP SERPL CALC-SCNC: 9 MMOL/L (ref 8–16)
APTT BLDCRRT: 29.3 SEC (ref 21–32)
AST SERPL-CCNC: 89 U/L (ref 10–40)
BASOPHILS # BLD AUTO: 0.01 K/UL (ref 0–0.2)
BASOPHILS NFR BLD: 0.1 % (ref 0–1.9)
BILIRUB SERPL-MCNC: 0.9 MG/DL (ref 0.1–1)
BUN SERPL-MCNC: 18 MG/DL (ref 6–20)
CALCIUM SERPL-MCNC: 8.4 MG/DL (ref 8.7–10.5)
CHLORIDE SERPL-SCNC: 108 MMOL/L (ref 95–110)
CK MB SERPL-MCNC: 10.3 NG/ML (ref 0.1–6.5)
CK MB SERPL-RTO: 0.3 % (ref 0–5)
CK SERPL-CCNC: 3206 U/L (ref 20–200)
CO2 SERPL-SCNC: 25 MMOL/L (ref 23–29)
CREAT SERPL-MCNC: 1.2 MG/DL (ref 0.5–1.4)
DIFFERENTIAL METHOD: ABNORMAL
EOSINOPHIL # BLD AUTO: 0 K/UL (ref 0–0.5)
EOSINOPHIL NFR BLD: 0.1 % (ref 0–8)
ERYTHROCYTE [DISTWIDTH] IN BLOOD BY AUTOMATED COUNT: 13.7 % (ref 11.5–14.5)
EST. GFR  (AFRICAN AMERICAN): >60 ML/MIN/1.73 M^2
EST. GFR  (NON AFRICAN AMERICAN): >60 ML/MIN/1.73 M^2
GLUCOSE SERPL-MCNC: 107 MG/DL (ref 70–110)
HCT VFR BLD AUTO: 35.9 % (ref 40–54)
HGB BLD-MCNC: 11.9 G/DL (ref 14–18)
IMM GRANULOCYTES # BLD AUTO: 0.04 K/UL (ref 0–0.04)
IMM GRANULOCYTES NFR BLD AUTO: 0.4 % (ref 0–0.5)
INR PPP: 1 (ref 0.8–1.2)
LYMPHOCYTES # BLD AUTO: 1.8 K/UL (ref 1–4.8)
LYMPHOCYTES NFR BLD: 18.3 % (ref 18–48)
MAGNESIUM SERPL-MCNC: 2.4 MG/DL (ref 1.6–2.6)
MCH RBC QN AUTO: 31.2 PG (ref 27–31)
MCHC RBC AUTO-ENTMCNC: 33.1 G/DL (ref 32–36)
MCV RBC AUTO: 94 FL (ref 82–98)
MONOCYTES # BLD AUTO: 0.6 K/UL (ref 0.3–1)
MONOCYTES NFR BLD: 5.6 % (ref 4–15)
NEUTROPHILS # BLD AUTO: 7.6 K/UL (ref 1.8–7.7)
NEUTROPHILS NFR BLD: 75.5 % (ref 38–73)
NRBC BLD-RTO: 0 /100 WBC
PHOSPHATE SERPL-MCNC: 2.2 MG/DL (ref 2.7–4.5)
PLATELET # BLD AUTO: 134 K/UL (ref 150–350)
PMV BLD AUTO: 11 FL (ref 9.2–12.9)
POTASSIUM SERPL-SCNC: 3.9 MMOL/L (ref 3.5–5.1)
PROT SERPL-MCNC: 6.3 G/DL (ref 6–8.4)
PROTHROMBIN TIME: 10.7 SEC (ref 9–12.5)
RBC # BLD AUTO: 3.82 M/UL (ref 4.6–6.2)
SODIUM SERPL-SCNC: 142 MMOL/L (ref 136–145)
TROPONIN I SERPL DL<=0.01 NG/ML-MCNC: 0.12 NG/ML (ref 0–0.03)
WBC # BLD AUTO: 10.02 K/UL (ref 3.9–12.7)

## 2020-01-25 PROCEDURE — 90792 PSYCH DIAG EVAL W/MED SRVCS: CPT | Mod: ,,, | Performed by: PSYCHIATRY & NEUROLOGY

## 2020-01-25 PROCEDURE — 25000003 PHARM REV CODE 250: Performed by: PHYSICIAN ASSISTANT

## 2020-01-25 PROCEDURE — 83735 ASSAY OF MAGNESIUM: CPT

## 2020-01-25 PROCEDURE — 85025 COMPLETE CBC W/AUTO DIFF WBC: CPT

## 2020-01-25 PROCEDURE — 63600175 PHARM REV CODE 636 W HCPCS: Performed by: PHYSICIAN ASSISTANT

## 2020-01-25 PROCEDURE — 97530 THERAPEUTIC ACTIVITIES: CPT

## 2020-01-25 PROCEDURE — 36415 COLL VENOUS BLD VENIPUNCTURE: CPT

## 2020-01-25 PROCEDURE — 11000001 HC ACUTE MED/SURG PRIVATE ROOM

## 2020-01-25 PROCEDURE — 90792 PR PSYCHIATRIC DIAGNOSTIC EVALUATION W/MEDICAL SERVICES: ICD-10-PCS | Mod: ,,, | Performed by: PSYCHIATRY & NEUROLOGY

## 2020-01-25 PROCEDURE — 82550 ASSAY OF CK (CPK): CPT

## 2020-01-25 PROCEDURE — 85610 PROTHROMBIN TIME: CPT

## 2020-01-25 PROCEDURE — 84484 ASSAY OF TROPONIN QUANT: CPT

## 2020-01-25 PROCEDURE — 99233 SBSQ HOSP IP/OBS HIGH 50: CPT | Mod: ,,, | Performed by: PSYCHIATRY & NEUROLOGY

## 2020-01-25 PROCEDURE — 80053 COMPREHEN METABOLIC PANEL: CPT

## 2020-01-25 PROCEDURE — 97535 SELF CARE MNGMENT TRAINING: CPT

## 2020-01-25 PROCEDURE — 25000003 PHARM REV CODE 250: Performed by: NURSE PRACTITIONER

## 2020-01-25 PROCEDURE — 97165 OT EVAL LOW COMPLEX 30 MIN: CPT

## 2020-01-25 PROCEDURE — 99233 PR SUBSEQUENT HOSPITAL CARE,LEVL III: ICD-10-PCS | Mod: ,,, | Performed by: PSYCHIATRY & NEUROLOGY

## 2020-01-25 PROCEDURE — 25500020 PHARM REV CODE 255: Performed by: PSYCHIATRY & NEUROLOGY

## 2020-01-25 PROCEDURE — 82553 CREATINE MB FRACTION: CPT

## 2020-01-25 PROCEDURE — 85730 THROMBOPLASTIN TIME PARTIAL: CPT

## 2020-01-25 PROCEDURE — 84100 ASSAY OF PHOSPHORUS: CPT

## 2020-01-25 RX ORDER — DIAZEPAM 5 MG/1
10 TABLET ORAL EVERY 6 HOURS
Status: DISCONTINUED | OUTPATIENT
Start: 2020-01-25 | End: 2020-01-26

## 2020-01-25 RX ORDER — LORAZEPAM 2 MG/ML
2 INJECTION INTRAMUSCULAR
Status: DISCONTINUED | OUTPATIENT
Start: 2020-01-25 | End: 2020-01-27

## 2020-01-25 RX ORDER — BUPRENORPHINE AND NALOXONE 2; .5 MG/1; MG/1
1 FILM, SOLUBLE BUCCAL; SUBLINGUAL 2 TIMES DAILY
Status: DISCONTINUED | OUTPATIENT
Start: 2020-01-25 | End: 2020-01-27

## 2020-01-25 RX ADMIN — BUPROPION HYDROCHLORIDE 300 MG: 300 TABLET, FILM COATED, EXTENDED RELEASE ORAL at 07:01

## 2020-01-25 RX ADMIN — HEPARIN SODIUM 5000 UNITS: 5000 INJECTION INTRAVENOUS; SUBCUTANEOUS at 01:01

## 2020-01-25 RX ADMIN — DIAZEPAM 10 MG: 5 TABLET ORAL at 08:01

## 2020-01-25 RX ADMIN — SODIUM CHLORIDE: 0.9 INJECTION, SOLUTION INTRAVENOUS at 05:01

## 2020-01-25 RX ADMIN — DIAZEPAM 5 MG: 5 TABLET ORAL at 07:01

## 2020-01-25 RX ADMIN — PREGABALIN 300 MG: 150 CAPSULE ORAL at 07:01

## 2020-01-25 RX ADMIN — HEPARIN SODIUM 5000 UNITS: 5000 INJECTION INTRAVENOUS; SUBCUTANEOUS at 05:01

## 2020-01-25 RX ADMIN — ASPIRIN 325 MG ORAL TABLET 325 MG: 325 PILL ORAL at 07:01

## 2020-01-25 RX ADMIN — BUPRENORPHINE HYDROCHLORIDE, NALOXONE HYDROCHLORIDE 1 FILM: 2; .5 FILM, SOLUBLE BUCCAL; SUBLINGUAL at 07:01

## 2020-01-25 RX ADMIN — ACETAMINOPHEN 500 MG: 500 TABLET ORAL at 01:01

## 2020-01-25 RX ADMIN — LORAZEPAM 2 MG: 2 INJECTION, SOLUTION INTRAMUSCULAR; INTRAVENOUS at 09:01

## 2020-01-25 RX ADMIN — HEPARIN SODIUM 5000 UNITS: 5000 INJECTION INTRAVENOUS; SUBCUTANEOUS at 11:01

## 2020-01-25 RX ADMIN — ACETAMINOPHEN 500 MG: 500 TABLET ORAL at 07:01

## 2020-01-25 RX ADMIN — Medication 100 MG: at 07:01

## 2020-01-25 RX ADMIN — DIAZEPAM 5 MG: 5 TABLET ORAL at 01:01

## 2020-01-25 RX ADMIN — IOHEXOL 100 ML: 350 INJECTION, SOLUTION INTRAVENOUS at 06:01

## 2020-01-25 RX ADMIN — DULOXETINE HYDROCHLORIDE 60 MG: 30 CAPSULE, DELAYED RELEASE ORAL at 07:01

## 2020-01-25 NOTE — PLAN OF CARE
Problem: Fall Injury Risk  Goal: Absence of Fall and Fall-Related Injury  Outcome: Ongoing, Progressing     Problem: Adjustment to Illness (Stroke, Ischemic/Transient Ischemic Attack)  Goal: Optimal Coping  Outcome: Ongoing, Progressing     Problem: Pain (Stroke, Ischemic/Transient Ischemic Attack)  Goal: Acceptable Pain Control  Outcome: Ongoing, Progressing     Problem: Infection  Goal: Infection Symptom Resolution  Outcome: Ongoing, Progressing

## 2020-01-25 NOTE — ASSESSMENT & PLAN NOTE
Presented to ED intoxicated  Monitoring for signs and symptoms of withdrawal  CIWA ordered  Banana bag administered  Continue thiamine  PRN diazepam and ativan  Psychiatry consulted

## 2020-01-25 NOTE — ASSESSMENT & PLAN NOTE
Pt endorses daily intake of 1/5th whiskey daily with history of withdrawal complications including DTs/seizures. Pt has been receiving PRNs with parameters for withdrawal s/sx. Pt reports worsening withdrawal symptoms.    - Interested in rehab, ongoing  for cessation  - Recommend scheduling Valium for withdrawal s/sx:  Valium Taper:  -Day 1: Valium 10mg PO q6hr  -Day 2: Valium 10mg PO q8hr  -Day 3: Valium 10mg PO q12hr  -Day 4: Valium 5mg PO q12hr  -Day 5: Valium 5mg PO once  - PRN: Ativan 2mg PO/IM q6hr PRN for any two of the following: SBP>160, DBP>105, HR>105, tremors, or diaphoresis

## 2020-01-25 NOTE — PLAN OF CARE
Problem: Occupational Therapy Goal  Goal: Occupational Therapy Goal  Description  Goals set on 1/25, with expiration date 2/8:  Patient will increase functional independence with ADLs by performing:    Grooming while standing at sink with Contact Guard Assistance.  UB Dressing with Stand-by Assistance.  LB Dressing with Stand-by Assistance.  Toileting from toilet with Contact Guard Assistance for hygiene and clothing management.   Step transfer with Contact Guard Assistance  Toilet transfer to toilet with Contact Guard Assistance.   Outcome: Ongoing, Progressing     Eval complete. Pt tolerated session well. Initiate OT POC.  BRIAN Rothman  01/25/2020

## 2020-01-25 NOTE — ASSESSMENT & PLAN NOTE
Stroke risk factor  Patient on amlodipine at home  Holding home BP meds  SBP <220  Labetalol prn  BP currently at goal

## 2020-01-25 NOTE — ASSESSMENT & PLAN NOTE
Pt with history of polysubstance use (benzo, alcohol, opiates), receives MAT outpatient with recent refills per .    - Discussed with primary team about pt's outpatient MAT. Agree with team to implement Suboxone 4mg SL BID to help mitigate pt's opiate withdrawal s/sx. Will further assess in AM and augment treatment as needed.  - Ongoing  on cessation  - Pt interested in rehab once deemed medically cleared/stable  - Opiate withdrawal PRNs for comfort may include the following:  - Clonidine 0.1-0.3mg PO q6-q8  - Hold Clonidine for HR<60, Systolic<90, Diastolic<60  - Hydroxyzine 25mg PO QID PRN for rhinorrhea  - Gabapentin 100mg TID for anxiety  - Zofran 4-8mg PO PRN for nausea/vomiting  - Bentyl for diarrhea  - Meloxicam w/food for pain  - Encourage PO fluids

## 2020-01-25 NOTE — PROGRESS NOTES
Pt arrived to MRI via stretcher on continuous tele & RA, pt alert, informed tele room that pt will be off portable tele & placed on MRI compatible monitor, will place pt back on tele post MRI, HR 80
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
## 2020-01-25 NOTE — PROGRESS NOTES
[facility]  Vascular Neurology  Comprehensive Stroke Center  Progress Note    Assessment/Plan:     * Embolic stroke involving right middle cerebral artery  Luke Coley is a 44 y.o. male medical history of HTN, fibromyalgia, alcohol abuse, suicidal ideation, elevated liver enzymes and bipolar disorder who presented to ED intoxicated. CT ordered in ED due to complaints of HA. CT with R parietal infarct. Admitting patient to stroke unit for further workup. TTE with PFO vs ASD. MRI confirmed R MCA infarct and BL globus pallidus lesions (suggestive of heroin abuse). Etiology unclear, differential includes ESUS vs paradoxical embolus vs drug induced. CTA head and neck pending    Antithrombotics for secondary stroke prevention: Antiplatelets: Aspirin: 325 mg daily    Statins for secondary stroke prevention and hyperlipidemia, if present:   Statins: None: Reason: elevated liver enzymes    Aggressive risk factor modification: HTN, Smoking, alcohol and drug use     Rehab efforts: The patient has been evaluated by a stroke team provider and the therapy needs have been fully considered based off the presenting complaints and exam findings. The following therapy evaluations are needed: PT evaluate and treat, OT evaluate and treat, SLP evaluate and treat, PM&R evaluate for appropriate placement    Diagnostics ordered/pending: CTA Head to assess vasculature , CTA Neck/Arch to assess vasculature    VTE prophylaxis: Heparin 5000 units SQ every 8 hours    BP parameters: Infarct: No intervention, SBP <220        Right to left cardiac shunt  TTE with R to L shunt, PFO vs ASD  Possible source of stroke  UE LE pending  Will need outpatient follow up with Dr. Damico for possible closure    Drug abuse  Stroke risk factor  Patient reports snorting small amount of drugs last night he believes was heroine  Tox positive for barbiturates and opiates  History of psychiatry hospitalization and alcohol/drug rehab in  2018  Starting diazepam for withdrawal  Psychiatry consulted    Essential hypertension  Stroke risk factor  Patient on amlodipine at home  Holding home BP meds  SBP <220  Labetalol prn  BP currently at goal    Cytotoxic cerebral edema  Area of cytotoxic cerebral edema identified when reviewing brain imaging in the territory of the R middle cerebral artery. There is mass effect associated with it. We will continue to monitor the patients clinical exam for any worsening of symptoms which may indicate expansion of the stroke or the area of the edema resulting in the clinical change. The pattern is suggestive of ESUS etiology.        Cephalalgia  Patient given multiple medications by ED staff for headache including, Fioricet, dexamethasone, benadryl, remeron, and ketorolac  Continue fioricet and tylenol    Elevated liver enzymes  History of transaminitis   and ALT 61, trending down  Holding statin    Tobacco use disorder  Stroke risk factor   on cessation prior to discharge    Suicidal ideations  History of suicidal ideations  Will monitor   Continue home psychiatric medications    Alcohol abuse  Presented to ED intoxicated  Monitoring for signs and symptoms of withdrawal  CIWA ordered  Banana bag administered  Continue thiamine  PRN diazepam and ativan  Psychiatry consulted    Anxiety and depression  Continue home medications         1/25 started on diazepam for possible withdrawal, continuing to complain of headache, psychiatry consulted     STROKE DOCUMENTATION        NIH Scale:  1a. Level of Consciousness: 0-->Alert, keenly responsive  1b. LOC Questions: 0-->Answers both questions correctly  1c. LOC Commands: 0-->Performs both tasks correctly  2. Best Gaze: 0-->Normal  3. Visual: 0-->No visual loss  4. Facial Palsy: 1-->Minor paralysis (flattened nasolabial fold, asymmetry on smiling)  5a. Motor Arm, Left: 0-->No drift, limb holds 90 (or 45) degrees for full 10 secs  5b. Motor Arm, Right: 0-->No  drift, limb holds 90 (or 45) degrees for full 10 secs  6a. Motor Leg, Left: 0-->No drift, leg holds 30 degree position for full 5 secs  6b. Motor Leg, Right: 0-->No drift, leg holds 30 degree position for full 5 secs  7. Limb Ataxia: 0-->Absent  8. Sensory: 1-->Mild-to-moderate sensory loss, patient feels pinprick is less sharp or is dull on the affected side, or there is a loss of superficial pain with pinprick, but patient is aware of being touched  9. Best Language: 0-->No aphasia, normal  10. Dysarthria: 0-->Normal  11. Extinction and Inattention (formerly Neglect): 0-->No abnormality  Total (NIH Stroke Scale): 2       Modified [place] Score: 0  Kecia Coma Scale:    ABCD2 Score:    [id number] Score:   HAS -BLED Score:   ICH Score:   Hunt & Godwin Classification:      Hemorrhagic change of an Ischemic Stroke: Does this patient have an ischemic stroke with hemorrhagic changes? No     Neurologic Chief Complaint: R MCA    Subjective:     Interval History: Patient is seen for follow-up neurological assessment and treatment recommendations: agitated and restless yesterday evening, complaining of 10/10 headache, some improvement in agitation with withdrawal treatment    HPI, Past Medical, Family, and Social History remains the same as documented in the initial encounter.     Review of Systems   Constitutional: Negative for chills and fever.   Eyes: Negative for visual disturbance.   Respiratory: Negative for cough.    Cardiovascular: Negative for chest pain.   Gastrointestinal: Negative for nausea and vomiting.   Neurological: Positive for facial asymmetry, numbness and headaches. Negative for speech difficulty and weakness.   Psychiatric/Behavioral: Positive for agitation.     Scheduled Meds:   aspirin  325 mg Oral Daily    buPROPion  300 mg Oral Daily    DULoxetine  60 mg Oral Daily    heparin (porcine)  5,000 Units Subcutaneous Q8H    OLANZapine  10 mg Oral QHS    pregabalin  300 mg Oral BID    thiamine  100  mg Oral Daily     Continuous Infusions:   sodium chloride 0.9%       PRN Meds:acetaminophen, diazePAM, labetalol, lorazepam, sodium chloride 0.9%, sodium chloride 0.9%    Objective:     Vital Signs (Most Recent):  Temp: 98 °F (36.7 °C) (01/25/20 1144)  Pulse: 80 (01/25/20 1204)  Resp: 20 (01/25/20 1144)  BP: (!) 144/98 (01/25/20 1144)  SpO2: 97 % (01/25/20 1144)  BP Location: Left arm    Vital Signs Range (Last 24H):  Temp:  [97.8 °F (36.6 °C)-98.9 °F (37.2 °C)]   Pulse:  []   Resp:  [12-20]   BP: (132-158)/(83-99)   SpO2:  [92 %-98 %]   BP Location: Left arm    Physical Exam   Constitutional: He appears well-developed and well-nourished. No distress.   HENT:   Head: Normocephalic and atraumatic.   Eyes: Pupils are equal, round, and reactive to light. EOM are normal.   Cardiovascular: Normal rate.   Pulmonary/Chest: Effort normal. No respiratory distress.   Neurological: He is alert.   Skin: Skin is warm and dry.   Vitals reviewed.      Neurological Exam:   LOC: alert  Attention Span: Good   Language: No aphasia  Articulation: No dysarthria  Orientation: Person, Place, Time   Visual Fields: Full  EOM (CN III, IV, VI): Full/intact  Pupils (CN II, III): PERRL  Facial Sensation (CN V): Facial sensory loss  Facial Movement (CN VII): Lower facial weakness on the Left  Motor: Arm left  Normal 5/5  Leg left  Normal 5/5  Arm right  Normal 5/5  Leg right Normal 5/5  Cebellar: No evidence of appendicular or axial ataxia  Sensation: Matthew-anesthesia left  Tone: Normal tone throughout    Laboratory:  CMP:   Recent Labs   Lab 01/25/20  0313   CALCIUM 8.4*   ALBUMIN 3.7   PROT 6.3      K 3.9   CO2 25      BUN 18   CREATININE 1.2   ALKPHOS 60   ALT 48*   AST 89*   BILITOT 0.9     CBC:   Recent Labs   Lab 01/25/20  0313   WBC 10.02   RBC 3.82*   HGB 11.9*   HCT 35.9*   *   MCV 94   MCH 31.2*   MCHC 33.1     Lipid Panel:   Recent Labs   Lab 01/24/20  1127   CHOL 128   LDLCALC 54.0*   HDL 53   TRIG 105      Coagulation:   Recent Labs   Lab 01/25/20  0313   INR 1.0   APTT 29.3     Platelet Aggregation Study: No results for input(s): PLTAGG, PLTAGINTERP, PLTAGREGLACO, ADPPLTAGGREG in the last 168 hours.  Hgb A1C:   Recent Labs   Lab 01/24/20  1545   HGBA1C 5.2     TSH:   Recent Labs   Lab 01/24/20  1127   TSH 0.625       Diagnostic Results     Brain Imaging   MRI Brain. Date: 01/25/20  Large right subtotal MCA vascular territory infarct without mass effect or hemorrhage.    CT Head. Date: 01/25/20  Moderate size region of parenchymal hypoattenuation and sulcal effacement involving the right parietooccipital region suggestive of evolving recent infarct without evidence of hemorrhagic conversion.  Additional, punctate hypodensities are present within the bilateral basal ganglia which appear more conspicuous from prior examination and additional regions of acute ischemia are not excluded.  Consider further evaluation with MRI for further assessment if there are no patient contraindications.    No evidence of midline shift or hydrocephalus.    CT Head. Date: 01/24/20  Mild moderate hypoattenuation with sulcal effacement right superior occipital/inferior parietal lobes peripherally concerning for recent infarction though indeterminate.  Clinical correlation and further evaluation with MRI recommended.  There is no evidence for hydrocephalus or acute intracranial hemorrhage.    Vessel Imaging   CTA head and neck pending    MRA. Date: 01/25/20  Technically very limited study.  Abnormal right carotid bulb, artifact versus stenosis.  Correlate with carotid Doppler.    Cardiac Imaging   TTE with bubble contrast. Date: 01/24/20  · Concentric left ventricular remodeling.  · Normal left ventricular systolic function. The estimated ejection fraction is 55%.  · The right ventricle is at the upper limit of normal in size with normal right ventricular systolic function.  · Normal LV diastolic function.  · Normal central venous  pressure (3 mmHg).  · With agitated saline administration, there is evidence of considerable right to left shunting consistent with a large PFO or small ASD.      Allison Gallegos PA-C  [facility] St[facility]  Department of Vascular Neurology   [facility]

## 2020-01-25 NOTE — ASSESSMENT & PLAN NOTE
TTE with R to L shunt, PFO vs ASD  Possible source of stroke  UE LE pending  Will need outpatient follow up with Dr. Damico for possible closure

## 2020-01-25 NOTE — NURSING
Pt c/o 10/10 headache, extremely anxious and restless,CIWA 21. pt states that the diazepam and tylenol that he received earlier gave him some relief for approximately 1 hour, . pt AAOx4, VSS. Stroke team notified, CT ordered, prn tylenol given at that time with no relief. prn Ativan given, banana bag infusing. Pt calm, cooperative, and resting. VSS, NADN, will continue to monitor.

## 2020-01-25 NOTE — ASSESSMENT & PLAN NOTE
Stroke risk factor  Patient reports snorting small amount of drugs last night he believes was heroine  Tox positive for barbiturates and opiates  History of psychiatry hospitalization and alcohol/drug rehab in 2018  Starting diazepam for withdrawal  Psychiatry consulted

## 2020-01-25 NOTE — CONSULTS
Inpatient consult to Physical Medicine Rehab  Consult performed by: Gaby Bear NP  Consult ordered by: Allison Gallegos PA-C  Reason for consult: assess rehab needs        Reviewed patient history and current admission.  Rehab team following.  Full consult to follow.    RYAN Jeter, FNP-C  Physical Medicine & Rehabilitation   01/25/2020

## 2020-01-25 NOTE — HPI
"Luke Coley is a 44 y.o. male who is seen today for an initial psychiatric evaluation by the addiction psychiatry consult service. Psychiatry was consulted for "alcohol withdrawal".    Per Primary MD:  Luke Coley is a 44 y.o. male medical history of HTN, fibromyalgia, alcohol and drug abuse, suicidal ideation, elevated liver enzymes and bipolar disorder who presented to ED intoxicated.  Patient stated he was out with friends last night drinking alcohol and snorted a small amount of drugs (possibly heroin).  When asked about where he slept for the night, patient states he was looking for his car all night. He presented to the emergency room around 6:00 a.m. this morning.  Currently, the patient is complaining of confusion, dizziness, headache, left facial numbness, and left-sided numbness.  Due to complaints of headache, ED physician ordered CT head which revealed right parietal infarct, stroke team consult. Patient states he drinks once per week but chart review indicates that he has been to rehab for alcohol/drug abuse in 2018.     Per Addiction Psych MD:  Patient seen at bedside. He is calm, cooperative, although complains of headache that worsens with movement. He endorses history of alcohol use disorder with withdrawal complications of DTs/seizures. He states his last drink was 4 days ago, however, due to current condition, pt is an inconsistent historian. Per Chart Review, pt reported to primary team that his last drink was last night where he also used other substances (possibly heroin). Primary team also notes confusion, dizziness, headache, with impaired recent memory. He is unable to recall specific details regarding presentation to ED, however, denies SI/HI/AVH. Pt expresses interest in rehabilitation upon medical clearance. He has been to rehab in 2018, endorsing 1 year of sobriety before relapsing due to increasing life stressors at that time. He appears tired, in pain, but currently " without overt tremors. However, pt does endorse worsening symptoms of withdrawal. Otherwise, pt requested to speak at later time due to his headache.    SUBSTANCE ABUSE HISTORY  Substance(s) of Choice: Alcohol, Opiates  Substances Used: Heroin  History of IVDU?: Unable to assess  Use of Alcohol: severe  Average Consumption: 1/5th whiskey daily  Last Drink: per chart review, the evening prior to admission  Use of Medications for Alcohol/Opioid Use Disorder: yes  History of Complicated Withdrawal: Yes, DTs/seizures  History of Detox: yes  Rehab History: Yes, pt recently finished a 30 day program in [place] 1 month ago. Per chart review, has been to over 50 rehab programs  AA/NA involvement: in the past but none recently (last ~6-12 months ago)  Tobacco: Yes    DSM-5 SUBSTANCE USE DISORDER CRITERIA   Mild (1-3), Moderate (4-5), Severe (?6)  1. Often take in larger amounts or over a longer period of time than was intended.  2. Persistent desire or unsuccessful efforts to cut down or control use.  3. Great deal of time spent in activities necessary to obtain substance, use, or recover from effects.  4. Craving/strong desire for substance or urge to use.  5. Use resulting in failure to fulfill major role obligations at home, work or school.  6. Social, occupational, recreational activities decreased because of use.  7. Continued use despite having persistent or recurrent social or interpersonal problems cause or exaserbated by the substance.  8. Recurrent use in situations in which it is physically hazardous.  9. Use despite physical or psychological problems that are likely to have been caused or exacerbated by the substance.  10. Tolerance, as defined by either of the following.   A. A need for markedly increased amounts of substance to achieve intoxication or desired effect. -OR-    B. A markedly diminished effect with continued use of the same amount of substance.  11. Withdrawal, as manifested by the following.   A.  The characteristic withdrawal syndrome for substance. -AND-   B. Substance is taken to relieve or avoid withdrawal symptoms.  ARE THE CRITERIA MET FOR DSM-5 SUBSTANCE USE DISORDER: severe    PSYCHIATRIC HISTORY  Reported Diagnose(s): bipolar II disorder, alcohol use disorder, opiate use disorder  Previous Medication Trials: yes, multiple including: Buprenorphine (Zubsolv, Suboxone, Bunavail), Wellbutrin, Cymbalta, Remeron, Zyprexa, Restoril, Gabapentin, Lyrica, Geodon, Lamictal, Klonopin, Xanax, Oxazepam, Valium, Ativan, Ambien  Previous Psychiatric Hospitalizations: yes  Outpatient Psychiatrist?: Dr. Preeti Logan    SUICIDE/HOMICIDE RISK ASSESSMENT  Current/active suicidal ideation/plan/intent: denies  Previous suicide attempts: pt denied initially, but per chart review, yes  Current/active homicidal ideation/plan/intent: denies    HISTORY OF TRAUMA, ABUSE & VIOLENCE  Trauma: unable to assess  Physical Abuse: unable to assess  Sexual Abuse: unable to assess  Violent Conduct: denies  Access to Gun: unable to assess     FAMILY PSYCHIATRIC HISTORY   Unable to assess     PSYCHOSOCIAL FACTORS  Stressors (Psychosocial and Environmental): family, health and drug and alcohol.

## 2020-01-25 NOTE — SUBJECTIVE & OBJECTIVE
Neurologic Chief Complaint: R MCA    Subjective:     Interval History: Patient is seen for follow-up neurological assessment and treatment recommendations: agitated and restless yesterday evening, complaining of 10/10 headache, some improvement in agitation with withdrawal treatment    HPI, Past Medical, Family, and Social History remains the same as documented in the initial encounter.     Review of Systems   Constitutional: Negative for chills and fever.   Eyes: Negative for visual disturbance.   Respiratory: Negative for cough.    Cardiovascular: Negative for chest pain.   Gastrointestinal: Negative for nausea and vomiting.   Neurological: Positive for facial asymmetry, numbness and headaches. Negative for speech difficulty and weakness.   Psychiatric/Behavioral: Positive for agitation.     Scheduled Meds:   aspirin  325 mg Oral Daily    buPROPion  300 mg Oral Daily    DULoxetine  60 mg Oral Daily    heparin (porcine)  5,000 Units Subcutaneous Q8H    OLANZapine  10 mg Oral QHS    pregabalin  300 mg Oral BID    thiamine  100 mg Oral Daily     Continuous Infusions:   sodium chloride 0.9%       PRN Meds:acetaminophen, diazePAM, labetalol, lorazepam, sodium chloride 0.9%, sodium chloride 0.9%    Objective:     Vital Signs (Most Recent):  Temp: 98 °F (36.7 °C) (01/25/20 1144)  Pulse: 80 (01/25/20 1204)  Resp: 20 (01/25/20 1144)  BP: (!) 144/98 (01/25/20 1144)  SpO2: 97 % (01/25/20 1144)  BP Location: Left arm    Vital Signs Range (Last 24H):  Temp:  [97.8 °F (36.6 °C)-98.9 °F (37.2 °C)]   Pulse:  []   Resp:  [12-20]   BP: (132-158)/(83-99)   SpO2:  [92 %-98 %]   BP Location: Left arm    Physical Exam   Constitutional: He appears well-developed and well-nourished. No distress.   HENT:   Head: Normocephalic and atraumatic.   Eyes: Pupils are equal, round, and reactive to light. EOM are normal.   Cardiovascular: Normal rate.   Pulmonary/Chest: Effort normal. No respiratory distress.   Neurological: He is  alert.   Skin: Skin is warm and dry.   Vitals reviewed.      Neurological Exam:   LOC: alert  Attention Span: Good   Language: No aphasia  Articulation: No dysarthria  Orientation: Person, Place, Time   Visual Fields: Full  EOM (CN III, IV, VI): Full/intact  Pupils (CN II, III): PERRL  Facial Sensation (CN V): Facial sensory loss  Facial Movement (CN VII): Lower facial weakness on the Left  Motor: Arm left  Normal 5/5  Leg left  Normal 5/5  Arm right  Normal 5/5  Leg right Normal 5/5  Cebellar: No evidence of appendicular or axial ataxia  Sensation: Matthew-anesthesia left  Tone: Normal tone throughout    Laboratory:  CMP:   Recent Labs   Lab 01/25/20  0313   CALCIUM 8.4*   ALBUMIN 3.7   PROT 6.3      K 3.9   CO2 25      BUN 18   CREATININE 1.2   ALKPHOS 60   ALT 48*   AST 89*   BILITOT 0.9     CBC:   Recent Labs   Lab 01/25/20  0313   WBC 10.02   RBC 3.82*   HGB 11.9*   HCT 35.9*   *   MCV 94   MCH 31.2*   MCHC 33.1     Lipid Panel:   Recent Labs   Lab 01/24/20  1127   CHOL 128   LDLCALC 54.0*   HDL 53   TRIG 105     Coagulation:   Recent Labs   Lab 01/25/20  0313   INR 1.0   APTT 29.3     Platelet Aggregation Study: No results for input(s): PLTAGG, PLTAGINTERP, PLTAGREGLACO, ADPPLTAGGREG in the last 168 hours.  Hgb A1C:   Recent Labs   Lab 01/24/20  1545   HGBA1C 5.2     TSH:   Recent Labs   Lab 01/24/20  1127   TSH 0.625       Diagnostic Results     Brain Imaging   MRI Brain. Date: 01/25/20  Large right subtotal MCA vascular territory infarct without mass effect or hemorrhage.    CT Head. Date: 01/25/20  Moderate size region of parenchymal hypoattenuation and sulcal effacement involving the right parietooccipital region suggestive of evolving recent infarct without evidence of hemorrhagic conversion.  Additional, punctate hypodensities are present within the bilateral basal ganglia which appear more conspicuous from prior examination and additional regions of acute ischemia are not excluded.   Consider further evaluation with MRI for further assessment if there are no patient contraindications.    No evidence of midline shift or hydrocephalus.    CT Head. Date: 01/24/20  Mild moderate hypoattenuation with sulcal effacement right superior occipital/inferior parietal lobes peripherally concerning for recent infarction though indeterminate.  Clinical correlation and further evaluation with MRI recommended.  There is no evidence for hydrocephalus or acute intracranial hemorrhage.    Vessel Imaging   CTA head and neck pending    MRA. Date: 01/25/20  Technically very limited study.  Abnormal right carotid bulb, artifact versus stenosis.  Correlate with carotid Doppler.    Cardiac Imaging   TTE with bubble contrast. Date: 01/24/20  · Concentric left ventricular remodeling.  · Normal left ventricular systolic function. The estimated ejection fraction is 55%.  · The right ventricle is at the upper limit of normal in size with normal right ventricular systolic function.  · Normal LV diastolic function.  · Normal central venous pressure (3 mmHg).  · With agitated saline administration, there is evidence of considerable right to left shunting consistent with a large PFO or small ASD.

## 2020-01-25 NOTE — NURSING
Allison Gallegos made aware patients pain persists at 10/10 despite all prn medications given throughout shift.  Charge nurse Mirna made aware of continuous efforts and interventions performed to help with patients pain. Will continue to monitor

## 2020-01-25 NOTE — ASSESSMENT & PLAN NOTE
Luke Coley is a 44 y.o. male medical history of HTN, fibromyalgia, alcohol abuse, suicidal ideation, elevated liver enzymes and bipolar disorder who presented to ED intoxicated. CT ordered in ED due to complaints of HA. CT with R parietal infarct. Admitting patient to stroke unit for further workup. TTE with PFO vs ASD. MRI confirmed R MCA infarct and BL globus pallidus lesions (suggestive of heroin abuse). Etiology unclear, differential includes ESUS vs paradoxical embolus vs drug induced. CTA head and neck pending    Antithrombotics for secondary stroke prevention: Antiplatelets: Aspirin: 325 mg daily    Statins for secondary stroke prevention and hyperlipidemia, if present:   Statins: None: Reason: elevated liver enzymes    Aggressive risk factor modification: HTN, Smoking, alcohol and drug use     Rehab efforts: The patient has been evaluated by a stroke team provider and the therapy needs have been fully considered based off the presenting complaints and exam findings. The following therapy evaluations are needed: PT evaluate and treat, OT evaluate and treat, SLP evaluate and treat, PM&R evaluate for appropriate placement    Diagnostics ordered/pending: CTA Head to assess vasculature , CTA Neck/Arch to assess vasculature    VTE prophylaxis: Heparin 5000 units SQ every 8 hours    BP parameters: Infarct: No intervention, SBP <220
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
## 2020-01-26 PROBLEM — F32.9 MAJOR DEPRESSIVE DISORDER: Status: ACTIVE | Noted: 2018-09-17

## 2020-01-26 PROBLEM — F19.20 POLYSUBSTANCE DEPENDENCE INCLUDING OPIOID TYPE DRUG WITH COMPLICATION, CONTINUOUS USE: Status: ACTIVE | Noted: 2020-01-24

## 2020-01-26 PROBLEM — F10.20 ALCOHOL USE DISORDER, SEVERE, DEPENDENCE: Status: ACTIVE | Noted: 2017-12-28

## 2020-01-26 PROBLEM — F10.20 ALCOHOL USE DISORDER, SEVERE, DEPENDENCE: Status: ACTIVE | Noted: 2020-01-26

## 2020-01-26 LAB
ALBUMIN SERPL BCP-MCNC: 3.5 G/DL (ref 3.5–5.2)
ALP SERPL-CCNC: 66 U/L (ref 55–135)
ALT SERPL W/O P-5'-P-CCNC: 41 U/L (ref 10–44)
ANION GAP SERPL CALC-SCNC: 8 MMOL/L (ref 8–16)
AST SERPL-CCNC: 57 U/L (ref 10–40)
BASOPHILS # BLD AUTO: 0.03 K/UL (ref 0–0.2)
BASOPHILS NFR BLD: 0.3 % (ref 0–1.9)
BILIRUB SERPL-MCNC: 0.7 MG/DL (ref 0.1–1)
BUN SERPL-MCNC: 8 MG/DL (ref 6–20)
CALCIUM SERPL-MCNC: 8.8 MG/DL (ref 8.7–10.5)
CHLORIDE SERPL-SCNC: 104 MMOL/L (ref 95–110)
CO2 SERPL-SCNC: 29 MMOL/L (ref 23–29)
CREAT SERPL-MCNC: 0.9 MG/DL (ref 0.5–1.4)
DIFFERENTIAL METHOD: ABNORMAL
EOSINOPHIL # BLD AUTO: 0.1 K/UL (ref 0–0.5)
EOSINOPHIL NFR BLD: 0.8 % (ref 0–8)
ERYTHROCYTE [DISTWIDTH] IN BLOOD BY AUTOMATED COUNT: 13.4 % (ref 11.5–14.5)
EST. GFR  (AFRICAN AMERICAN): >60 ML/MIN/1.73 M^2
EST. GFR  (NON AFRICAN AMERICAN): >60 ML/MIN/1.73 M^2
GLUCOSE SERPL-MCNC: 90 MG/DL (ref 70–110)
HCT VFR BLD AUTO: 37.5 % (ref 40–54)
HGB BLD-MCNC: 12.3 G/DL (ref 14–18)
IMM GRANULOCYTES # BLD AUTO: 0.05 K/UL (ref 0–0.04)
IMM GRANULOCYTES NFR BLD AUTO: 0.6 % (ref 0–0.5)
LYMPHOCYTES # BLD AUTO: 1.7 K/UL (ref 1–4.8)
LYMPHOCYTES NFR BLD: 19.3 % (ref 18–48)
MCH RBC QN AUTO: 30.8 PG (ref 27–31)
MCHC RBC AUTO-ENTMCNC: 32.8 G/DL (ref 32–36)
MCV RBC AUTO: 94 FL (ref 82–98)
MONOCYTES # BLD AUTO: 0.6 K/UL (ref 0.3–1)
MONOCYTES NFR BLD: 6.6 % (ref 4–15)
NEUTROPHILS # BLD AUTO: 6.5 K/UL (ref 1.8–7.7)
NEUTROPHILS NFR BLD: 72.4 % (ref 38–73)
NRBC BLD-RTO: 0 /100 WBC
PLATELET # BLD AUTO: 120 K/UL (ref 150–350)
PMV BLD AUTO: 11.5 FL (ref 9.2–12.9)
POCT GLUCOSE: 105 MG/DL (ref 70–110)
POTASSIUM SERPL-SCNC: 3.6 MMOL/L (ref 3.5–5.1)
PROT SERPL-MCNC: 6.3 G/DL (ref 6–8.4)
RBC # BLD AUTO: 4 M/UL (ref 4.6–6.2)
SODIUM SERPL-SCNC: 141 MMOL/L (ref 136–145)
WBC # BLD AUTO: 8.98 K/UL (ref 3.9–12.7)

## 2020-01-26 PROCEDURE — 36415 COLL VENOUS BLD VENIPUNCTURE: CPT

## 2020-01-26 PROCEDURE — 80053 COMPREHEN METABOLIC PANEL: CPT

## 2020-01-26 PROCEDURE — 97161 PT EVAL LOW COMPLEX 20 MIN: CPT

## 2020-01-26 PROCEDURE — 93005 ELECTROCARDIOGRAM TRACING: CPT

## 2020-01-26 PROCEDURE — 99233 PR SUBSEQUENT HOSPITAL CARE,LEVL III: ICD-10-PCS | Mod: ,,, | Performed by: PSYCHIATRY & NEUROLOGY

## 2020-01-26 PROCEDURE — 25000003 PHARM REV CODE 250: Performed by: PHYSICIAN ASSISTANT

## 2020-01-26 PROCEDURE — 63600175 PHARM REV CODE 636 W HCPCS: Performed by: PHYSICIAN ASSISTANT

## 2020-01-26 PROCEDURE — 93010 EKG 12-LEAD: ICD-10-PCS | Mod: ,,, | Performed by: INTERNAL MEDICINE

## 2020-01-26 PROCEDURE — 99233 SBSQ HOSP IP/OBS HIGH 50: CPT | Mod: ,,, | Performed by: PSYCHIATRY & NEUROLOGY

## 2020-01-26 PROCEDURE — 93010 ELECTROCARDIOGRAM REPORT: CPT | Mod: ,,, | Performed by: INTERNAL MEDICINE

## 2020-01-26 PROCEDURE — 85025 COMPLETE CBC W/AUTO DIFF WBC: CPT

## 2020-01-26 PROCEDURE — 11000001 HC ACUTE MED/SURG PRIVATE ROOM

## 2020-01-26 PROCEDURE — 25000003 PHARM REV CODE 250: Performed by: NURSE PRACTITIONER

## 2020-01-26 RX ORDER — METOCLOPRAMIDE HYDROCHLORIDE 5 MG/ML
10 INJECTION INTRAMUSCULAR; INTRAVENOUS ONCE
Status: DISCONTINUED | OUTPATIENT
Start: 2020-01-26 | End: 2020-01-26

## 2020-01-26 RX ORDER — ATORVASTATIN CALCIUM 10 MG/1
10 TABLET, FILM COATED ORAL DAILY
Status: DISCONTINUED | OUTPATIENT
Start: 2020-01-26 | End: 2020-01-31 | Stop reason: HOSPADM

## 2020-01-26 RX ORDER — DIVALPROEX SODIUM 250 MG/1
500 TABLET, FILM COATED, EXTENDED RELEASE ORAL DAILY
Status: DISCONTINUED | OUTPATIENT
Start: 2020-01-26 | End: 2020-01-27

## 2020-01-26 RX ORDER — MAGNESIUM SULFATE HEPTAHYDRATE 40 MG/ML
2 INJECTION, SOLUTION INTRAVENOUS
Status: COMPLETED | OUTPATIENT
Start: 2020-01-26 | End: 2020-01-26

## 2020-01-26 RX ORDER — DIAZEPAM 5 MG/1
10 TABLET ORAL EVERY 8 HOURS
Status: DISCONTINUED | OUTPATIENT
Start: 2020-01-26 | End: 2020-01-27

## 2020-01-26 RX ORDER — KETOROLAC TROMETHAMINE 30 MG/ML
30 INJECTION, SOLUTION INTRAMUSCULAR; INTRAVENOUS ONCE
Status: COMPLETED | OUTPATIENT
Start: 2020-01-26 | End: 2020-01-26

## 2020-01-26 RX ADMIN — ACETAMINOPHEN 500 MG: 500 TABLET ORAL at 11:01

## 2020-01-26 RX ADMIN — HEPARIN SODIUM 5000 UNITS: 5000 INJECTION INTRAVENOUS; SUBCUTANEOUS at 01:01

## 2020-01-26 RX ADMIN — ACETAMINOPHEN 500 MG: 500 TABLET ORAL at 08:01

## 2020-01-26 RX ADMIN — DIAZEPAM 10 MG: 5 TABLET ORAL at 11:01

## 2020-01-26 RX ADMIN — MAGNESIUM SULFATE IN WATER 2 G: 40 INJECTION, SOLUTION INTRAVENOUS at 03:01

## 2020-01-26 RX ADMIN — DIVALPROEX SODIUM 500 MG: 250 TABLET, EXTENDED RELEASE ORAL at 03:01

## 2020-01-26 RX ADMIN — BUPRENORPHINE HYDROCHLORIDE, NALOXONE HYDROCHLORIDE 1 FILM: 2; .5 FILM, SOLUBLE BUCCAL; SUBLINGUAL at 08:01

## 2020-01-26 RX ADMIN — ACETAMINOPHEN 500 MG: 500 TABLET ORAL at 05:01

## 2020-01-26 RX ADMIN — HEPARIN SODIUM 5000 UNITS: 5000 INJECTION INTRAVENOUS; SUBCUTANEOUS at 08:01

## 2020-01-26 RX ADMIN — PREGABALIN 300 MG: 150 CAPSULE ORAL at 04:01

## 2020-01-26 RX ADMIN — Medication 100 MG: at 08:01

## 2020-01-26 RX ADMIN — OLANZAPINE 10 MG: 2.5 TABLET, FILM COATED ORAL at 08:01

## 2020-01-26 RX ADMIN — BUPROPION HYDROCHLORIDE 300 MG: 300 TABLET, FILM COATED, EXTENDED RELEASE ORAL at 08:01

## 2020-01-26 RX ADMIN — KETOROLAC TROMETHAMINE 30 MG: 30 INJECTION, SOLUTION INTRAMUSCULAR; INTRAVENOUS at 01:01

## 2020-01-26 RX ADMIN — DIAZEPAM 10 MG: 5 TABLET ORAL at 08:01

## 2020-01-26 RX ADMIN — PREGABALIN 300 MG: 150 CAPSULE ORAL at 08:01

## 2020-01-26 RX ADMIN — ASPIRIN 325 MG ORAL TABLET 325 MG: 325 PILL ORAL at 08:01

## 2020-01-26 RX ADMIN — DIAZEPAM 10 MG: 5 TABLET ORAL at 06:01

## 2020-01-26 RX ADMIN — DULOXETINE HYDROCHLORIDE 60 MG: 30 CAPSULE, DELAYED RELEASE ORAL at 08:01

## 2020-01-26 RX ADMIN — HEPARIN SODIUM 5000 UNITS: 5000 INJECTION INTRAVENOUS; SUBCUTANEOUS at 06:01

## 2020-01-26 RX ADMIN — ACETAMINOPHEN 500 MG: 500 TABLET ORAL at 03:01

## 2020-01-26 RX ADMIN — ATORVASTATIN CALCIUM 10 MG: 10 TABLET, FILM COATED ORAL at 09:01

## 2020-01-26 RX ADMIN — MAGNESIUM SULFATE IN WATER 2 G: 40 INJECTION, SOLUTION INTRAVENOUS at 06:01

## 2020-01-26 RX ADMIN — ACETAMINOPHEN 500 MG: 500 TABLET ORAL at 01:01

## 2020-01-26 NOTE — NURSING
Milvia Meléndez NP reconfirmed okay to give morning dose of lyrica being that night dose was unable to be given until 400 am. Also okay to continue valium regimen considering asymptomatic bradycardia. Pt states that he has a low heart rate normally. Pt states not unusual for heart rate to be in the 50's. EKG obtained. Sinus jamal at 53 noted. Provider aware. Platlets 120. Also okay to continue heparin SQ for VTE. Will relay this to oncoming nurse and continue to monitor pt at this time.

## 2020-01-26 NOTE — SUBJECTIVE & OBJECTIVE
Neurologic Chief Complaint: R MCA    Subjective:     Interval History: Patient is seen for follow-up neurological assessment and treatment recommendations: NAEON, complaining of persistent headache, asymptomatic bradycardia    HPI, Past Medical, Family, and Social History remains the same as documented in the initial encounter.     Review of Systems   Constitutional: Negative for chills and fever.   Eyes: Negative for visual disturbance.   Respiratory: Negative for cough.    Cardiovascular: Negative for chest pain.   Gastrointestinal: Negative for nausea and vomiting.   Neurological: Positive for facial asymmetry, numbness and headaches. Negative for speech difficulty and weakness.   Psychiatric/Behavioral: Negative for agitation.     Scheduled Meds:   aspirin  325 mg Oral Daily    buprenorphine-naloxone 2-0.5 mg  1 Film Sublingual BID    buPROPion  300 mg Oral Daily    diazePAM  10 mg Oral Q6H    DULoxetine  60 mg Oral Daily    heparin (porcine)  5,000 Units Subcutaneous Q8H    metoclopramide HCl  10 mg Intravenous Once    OLANZapine  10 mg Oral QHS    pregabalin  300 mg Oral BID    thiamine  100 mg Oral Daily     Continuous Infusions:   sodium chloride 0.9%       PRN Meds:acetaminophen, labetalol, lorazepam, sodium chloride 0.9%, sodium chloride 0.9%    Objective:     Vital Signs (Most Recent):  Temp: 96.2 °F (35.7 °C) (01/26/20 1152)  Pulse: (!) 58 (01/26/20 1152)  Resp: 18 (01/26/20 1152)  BP: (!) 141/92 (01/26/20 1152)  SpO2: 95 % (01/26/20 1152)  BP Location: Right arm    Vital Signs Range (Last 24H):  Temp:  [96.2 °F (35.7 °C)-98.7 °F (37.1 °C)]   Pulse:  [53-66]   Resp:  [10-19]   BP: (132-161)/(77-92)   SpO2:  [93 %-98 %]   BP Location: Right arm    Physical Exam   Constitutional: He appears well-developed and well-nourished. No distress.   HENT:   Head: Normocephalic and atraumatic.   Eyes: Pupils are equal, round, and reactive to light. EOM are normal.   Cardiovascular: Bradycardia present.    Pulmonary/Chest: Effort normal. No respiratory distress.   Musculoskeletal: Normal range of motion.   Neurological: He is alert.   Skin: Skin is warm and dry.   Vitals reviewed.      Neurological Exam:   LOC: alert  Attention Span: Good   Language: No aphasia  Articulation: No dysarthria  Orientation: Person, Place, Time   Visual Fields: Full  EOM (CN III, IV, VI): Full/intact  Pupils (CN II, III): PERRL  Facial Sensation (CN V): Facial sensory loss  Facial Movement (CN VII): Lower facial weakness on the Left  Motor: Arm left  Normal 5/5  Leg left  Normal 5/5  Arm right  Normal 5/5  Leg right Normal 5/5  Cebellar: No evidence of appendicular or axial ataxia  Sensation: Matthew-anesthesia left  Tone: Normal tone throughout    Laboratory:  CMP:   Recent Labs   Lab 01/26/20  0405   CALCIUM 8.8   ALBUMIN 3.5   PROT 6.3      K 3.6   CO2 29      BUN 8   CREATININE 0.9   ALKPHOS 66   ALT 41   AST 57*   BILITOT 0.7     CBC:   Recent Labs   Lab 01/26/20  0405   WBC 8.98   RBC 4.00*   HGB 12.3*   HCT 37.5*   *   MCV 94   MCH 30.8   MCHC 32.8     Lipid Panel:   Recent Labs   Lab 01/24/20  1127   CHOL 128   LDLCALC 54.0*   HDL 53   TRIG 105     Coagulation:   Recent Labs   Lab 01/25/20  0313   INR 1.0   APTT 29.3     Platelet Aggregation Study: No results for input(s): PLTAGG, PLTAGINTERP, PLTAGREGLACO, ADPPLTAGGREG in the last 168 hours.  Hgb A1C:   Recent Labs   Lab 01/24/20  1545   HGBA1C 5.2     TSH:   Recent Labs   Lab 01/24/20  1127   TSH 0.625       Diagnostic Results     Brain Imaging   MRI Brain. Date: 01/25/20  Large right subtotal MCA vascular territory infarct without mass effect or hemorrhage.    CT Head. Date: 01/25/20  Moderate size region of parenchymal hypoattenuation and sulcal effacement involving the right parietooccipital region suggestive of evolving recent infarct without evidence of hemorrhagic conversion.  Additional, punctate hypodensities are present within the bilateral basal  ganglia which appear more conspicuous from prior examination and additional regions of acute ischemia are not excluded.  Consider further evaluation with MRI for further assessment if there are no patient contraindications.    No evidence of midline shift or hydrocephalus.    CT Head. Date: 01/24/20  Mild moderate hypoattenuation with sulcal effacement right superior occipital/inferior parietal lobes peripherally concerning for recent infarction though indeterminate.  Clinical correlation and further evaluation with MRI recommended.  There is no evidence for hydrocephalus or acute intracranial hemorrhage.    Vessel Imaging   CTA head and neck. Date: 01/25/20  No evidence of high-grade stenosis or major vascular occlusion.    Evolving subtotal right MCA infarct.  Evolving acute bilateral basal ganglia lacunar infarcts    Age advanced cerebral volume loss.    MRA. Date: 01/25/20  Technically very limited study.  Abnormal right carotid bulb, artifact versus stenosis.  Correlate with carotid Doppler.    Cardiac Imaging   TTE with bubble contrast. Date: 01/24/20  · Concentric left ventricular remodeling.  · Normal left ventricular systolic function. The estimated ejection fraction is 55%.  · The right ventricle is at the upper limit of normal in size with normal right ventricular systolic function.  · Normal LV diastolic function.  · Normal central venous pressure (3 mmHg).  · With agitated saline administration, there is evidence of considerable right to left shunting consistent with a large PFO or small ASD.
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
## 2020-01-26 NOTE — ASSESSMENT & PLAN NOTE
Stroke risk factor  Patient reports snorting small amount of drugs night prior to presentation while he believes was heroine. Tox screen positive for barbiturates and opiates  History of psychiatric hospitalization and alcohol/drug rehab in 2018.   with 71 prescriptions, 18 prescribers, and 15 pharmacies.    Addiction Psychiatry consulted, Appreciate assistance.  - Pt on Zubsolv 5.7-1.4 mg TID at home. Discussed with psych resident; ordered 8-2mg BID regimen inpatient for now. If patient is withdrawing on 1/28, could consider increasing regimen to TID dosing.  - Psych discouraging against abrupt discontinuation of benzos due to high risk for further severe withdrawal syndromes. Pt was on Valium 10mg TID yesterday - switched to Valium 5mg TID today. Per psych, likely ok with 5mg BID tomorrow 1/28, etc. (see recs)  - Pt's psychiatrist is Preeti Logan, though [facility] psych team is not sure if pt was following as closely as he needed; may possibly need to provide pt a few day's doses of Suboxone at discharge.   - Need to ensure with CM that pt has close follow-up established with his psychiatrist prior to d/c.    - Pt was counseled on cessation. Previously showed interest in inpatient rehab; CM on-call gave list of options. Though per Psych, pt later less amenable. Need to continue reinforcing.

## 2020-01-26 NOTE — ASSESSMENT & PLAN NOTE
History of transaminitis  Elevated liver enzymes on admission, trended down, starting low dose atorvastatin

## 2020-01-26 NOTE — PLAN OF CARE
Pt AAOx4. Forgetful concerning events leading up to hospitalization and forgetful of conversations held with providers regarding some of his care and diagnosis.   Follows commands, pupils equal and reactive,some generalized weakness noted.   VSS. New bradycardia noted. Provider aware. Lowest heart rate at 47.  Good urine output noted.   +2 pulses.  No acute events overnight, afebrile throughout night.  Bed in low position, side rails up x3, urinal within reach, call bell within reach, pt instructed to call for assistance. Bed alarm on and audible. Plan of care reviewed with pt. Pt verbalizes understanding at this time but requires reminders regarding care.

## 2020-01-26 NOTE — PLAN OF CARE
Problem: Physical Therapy Goal  Goal: Physical Therapy Goal  Description  Goals to be met by: 20     Patient will increase functional independence with mobility by performin. Sit to stand transfer with [place]  2. Bed to chair transfer with [place] using no AD.  3. Gait  x 250 feet with Supervision using no AD.      Outcome: Ongoing, Progressing     Sherrell aMriee, PT, DPT  2020

## 2020-01-26 NOTE — NURSING
Provider Milvia Meléndez made aware that pt received naloxone at 1900 and then valium around 2100. Pt is drowsy. Unable to stay awake to take medications at this time. zyprexa 10mg and lyrica 300mg left to give. Okay to hold and give next dose as ordered per provider. Also made aware of new bradycardia. Pt has been in the 70's and 80's NSR but now running in the mid 50's. Pt is asymptomatic. Will continue to monitor.

## 2020-01-26 NOTE — PLAN OF CARE
POC reviewed with patient. Pt verbalized understanding. Patient educated on strategies to prevent fall injuries. Questions and concerns addressed. No acute events today. Pt progressing toward goals. Will continue to monitor. See flowsheets for full assessments and VS info.

## 2020-01-26 NOTE — ASSESSMENT & PLAN NOTE
Luke Coley is a 44 y.o. male medical history of HTN, fibromyalgia, alcohol abuse, suicidal ideation, elevated liver enzymes and bipolar disorder who presented to ED intoxicated. CT ordered in ED due to complaints of HA. CT with R parietal infarct. Admitting patient to stroke unit for further workup. TTE with PFO vs ASD. MRI confirmed R MCA infarct and BL globus pallidus lesions (suggestive of heroin abuse). Etiology ESUS, differential includes ESUS, paradoxical embolus due to cardiac shunt vs drug induced. CTA head and neck unremarkable     Antithrombotics for secondary stroke prevention: Antiplatelets: Aspirin: 325 mg daily    Statins for secondary stroke prevention and hyperlipidemia, if present:   Statins: Atorvastatin- 10 mg daily, LFTs improved, starting lower dose due to history of alcoholism    Aggressive risk factor modification: HTN, Smoking, alcohol and drug use     Rehab efforts: The patient has been evaluated by a stroke team provider and the therapy needs have been fully considered based off the presenting complaints and exam findings. The following therapy evaluations are needed: PT evaluate and treat, OT evaluate and treat, SLP evaluate and treat, PM&R evaluate for appropriate placement    Diagnostics ordered/pending: None     VTE prophylaxis: Heparin 5000 units SQ every 8 hours    BP parameters: Infarct: No intervention, SBP <220
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
## 2020-01-26 NOTE — ASSESSMENT & PLAN NOTE
Stroke risk factor  Patient reports snorting small amount of drugs last night he believes was heroine  Tox positive for barbiturates and opiates  History of psychiatry hospitalization and alcohol/drug rehab in 2018  Continue valium taper and suboxone  Counseled on cessation, interested in rehab resources  Psychiatry consulted, appreciate recs

## 2020-01-26 NOTE — SUBJECTIVE & OBJECTIVE
"Interval History: see hospital course    Family History     Problem Relation (Age of Onset)    Benign prostatic hyperplasia Father    Breast cancer Mother (53)    Down syndrome Daughter    Hashimoto's thyroiditis Sister    Hypertension Father    Irritable bowel syndrome Sister    No Known Problems Sister        Tobacco Use    Smoking status: Current Every Day Smoker     Packs/day: 0.05     Types: Cigarettes    Smokeless tobacco: Never Used    Tobacco comment: Currently uses nicorette gum and lozenges.   Substance and Sexual Activity    Alcohol use: No    Drug use: No    Sexual activity: Yes     Partners: Female     Birth control/protection: None     Psychotherapeutics (From admission, onward)    Start     Stop Route Frequency Ordered    01/25/20 2017  lorazepam injection 2 mg      -- IV Every hour PRN 01/25/20 2017 01/25/20 2000  diazePAM tablet 10 mg      -- Oral Every 6 hours 01/25/20 1640 01/25/20 0900  buPROPion 24 hr tablet 300 mg      -- Oral Daily 01/24/20 1855 01/25/20 0900  DULoxetine DR capsule 60 mg      -- Oral Daily 01/24/20 1855 01/24/20 2100  OLANZapine tablet 10 mg      -- Oral Nightly 01/24/20 1855           Objective:     Vital Signs (Most Recent):  Temp: 97.8 °F (36.6 °C) (01/26/20 0721)  Pulse: (!) 54(Simultaneous filing. User may not have seen previous data.) (01/26/20 0721)  Resp: 18 (01/26/20 0721)  BP: (!) 155/88 (01/26/20 0721)  SpO2: (!) 94 % (01/26/20 0721) Vital Signs (24h Range):  Temp:  [97.8 °F (36.6 °C)-98.7 °F (37.1 °C)] 97.8 °F (36.6 °C)  Pulse:  [53-80] 54  Resp:  [10-20] 18  SpO2:  [93 %-98 %] 94 %  BP: (132-161)/(77-98) 155/88     Height: 5' 8" (172.7 cm)  Weight: 81.6 kg (180 lb)  Body mass index is 27.37 kg/m².      Intake/Output Summary (Last 24 hours) at 1/26/2020 0847  Last data filed at 1/26/2020 0800  Gross per 24 hour   Intake 500 ml   Output 3000 ml   Net -2500 ml       Physical Exam   Psychiatric:   Orientation: oriented to self, place, disoriented to " "time, date  Speech: slowed, delayed, spontaneous  Language: english, fluent  Mood: "fine"  Affect: constricted  Thought Process: linear, goal-directed  Associations: intact  Thought Content: linear  Memory: impaired  Attention and Concentration: impaired  Fund of Knowledge: aware of current president  Insight: limited  Judgment: limited           Significant Labs: All pertinent labs within the past 24 hours have been reviewed.    Significant Imaging: I have reviewed all pertinent imaging results/findings within the past 24 hours.  "

## 2020-01-26 NOTE — ASSESSMENT & PLAN NOTE
Presented to ED intoxicated  Monitoring for signs and symptoms of withdrawal  CIWA ordered  Banana bag administered  Continue thiamine and valium taper  Counseled on cessations, expressing interest in rehab resources  Psychiatry consulted, appreciate recs

## 2020-01-26 NOTE — PROGRESS NOTES
"[facility]  Psychiatry  Progress Note    Patient Name: Luke Coley  MRN: [id number]   Code Status: Full Code  Admission Date: 1/24/2020  Hospital Length of Stay: 2 days  Expected Discharge Date:   Attending Physician: Sekou De La Torre MD  Primary Care Provider: Primary Doctor No    Current Legal Status: N/A    Patient information was obtained from patient and ER records.     Subjective:     Principal Problem:Embolic stroke involving right middle cerebral artery    Chief Complaint: withdrawal    HPI:   CHIEF COMPLAINT  Luke Coley is a 44 y.o. male who is seen today for an initial psychiatric evaluation by the addiction psychiatry consult service. Psychiatry was consulted for "alcohol withdrawal".    HISTORY OF PRESENT ILLNESS    Per Primary MD:  Luke Coley is a 44 y.o. male medical history of HTN, fibromyalgia, alcohol and drug abuse, suicidal ideation, elevated liver enzymes and bipolar disorder who presented to ED intoxicated.  Patient stated he was out with friends last night drinking alcohol and snorted a small amount of drugs (possibly heroin).  When asked about where he slept for the night, patient states he was looking for his car all night. He presented to the emergency room around 6:00 a.m. this morning.  Currently, the patient is complaining of confusion, dizziness, headache, left facial numbness, and left-sided numbness.  Due to complaints of headache, ED physician ordered CT head which revealed right parietal infarct, stroke team consult. Patient states he drinks once per week but chart review indicates that he has been to rehab for alcohol/drug abuse in 2018.     Per Addiction Psych MD:  Patient seen at bedside. He is calm, cooperative, although complains of headache that worsens with movement. He endorses history of alcohol use disorder with withdrawal complications of DTs/seizures. He states his last drink was 4 days ago, however, due to current " "condition, pt is an inconsistent historian. Per Chart Review, pt reported to primary team that his last drink was last night where he also used other substances (possibly heroin). Primary team also notes confusion, dizziness, headache, with impaired recent memory. He is unable to recall specific details regarding presentation to ED, however, denies SI/HI/AVH. Pt expresses interest in rehabilitation upon medical clearance. He has been to rehab in 2018, endorsing 1 year of sobriety before relapsing due to increasing life stressors at that time. He appears tired, in pain, but currently without overt tremors. However, pt does endorse worsening symptoms of withdrawal. Otherwise, pt requested to speak at later time due to his headache.    COLLATERAL  None attained    SUBSTANCE ABUSE HISTORY  Substance(s) of Choice: Alcohol, Opiates  Substances Used: Heroin  History of IVDU?: Unable to assess  Use of Alcohol: severe  Average Consumption: 1/5th whiskey daily  Last Drink: "4 days ago" "last night"  Use of Medications for Alcohol/Opioid Use Disorder: Denies  History of Complicated Withdrawal: Yes, DTs/seizures  History of Detox: Unable to assess  Rehab History: Yes, 2018  AA/NA involvement: Unable to assess  Tobacco: Yes    DSM-5 SUBSTANCE USE DISORDER CRITERIA   Mild (1-3), Moderate (4-5), Severe (?6)  1. Often take in larger amounts or over a longer period of time than was intended.  2. Persistent desire or unsuccessful efforts to cut down or control use.  3. Great deal of time spent in activities necessary to obtain substance, use, or recover from effects.  4. Craving/strong desire for substance or urge to use.  5. Use resulting in failure to fulfill major role obligations at home, work or school.  6. Social, occupational, recreational activities decreased because of use.  7. Continued use despite having persistent or recurrent social or interpersonal problems cause or exaserbated by the substance.  8. Recurrent use in " situations in which it is physically hazardous.  9. Use despite physical or psychological problems that are likely to have been caused or exacerbated by the substance.  10. Tolerance, as defined by either of the following.   A. A need for markedly increased amounts of substance to achieve intoxication or desired effect. -OR-    B. A markedly diminished effect with continued use of the same amount of substance.  11. Withdrawal, as manifested by the following.   A. The characteristic withdrawal syndrome for substance. -AND-   B. Substance is taken to relieve or avoid withdrawal symptoms.    ARE THE CRITERIA MET FOR DSM-5 SUBSTANCE USE DISORDER: severe      PSYCHIATRIC HISTORY  Reported Diagnose(s): bipolar disorder, alcohol use disorder, opiate use disorder  Previous Medication Trials: unable to assess  Previous Psychiatric Hospitalizations: yes  Outpatient Psychiatrist?: denies      SUICIDE/HOMICIDE RISK ASSESSMENT  Current/active suicidal ideation/plan/intent: denies  Previous suicide attempts: denies  Current/active homicidal ideation/plan/intent: denies      HISTORY OF TRAUMA, ABUSE & VIOLENCE  Trauma: unable to assess  Physical Abuse: unable to assess  Sexual Abuse: unable to assess  Violent Conduct: denies    Access to Gun: unable to assess       FAMILY PSYCHIATRIC HISTORY   Unable to assess     PSYCHOSOCIAL FACTORS  Stressors (Psychosocial and Environmental): family, health and drug and alcohol.       PSYCHIATRIC ROS  Fatigue, myalgias, headache, diaphoresis    MEDICAL ROS    Complete review of systems performed covering Constitutional, Eyes, ENT/Mouth, Cardiovascular, Respiratory, Gastrointestinal, Genitourinary, Musculoskeletal, Skin, Neurologic, Endocrine, Heme/Lymph, and Allergy/Immune.     Complete review of systems was negative with the exception of the following positive symptoms: headache, impaired memory recall, confusion      ALLERGIES  Patient has no known  allergies.      MEDICATIONS    Psychotropics:  Noted below    Infusions:   sodium chloride 0.9%         Scheduled:   aspirin  325 mg Oral Daily    buprenorphine-naloxone 4-1 mg  1 Film Sublingual BID    buPROPion  300 mg Oral Daily    diazePAM  10 mg Oral Q6H    DULoxetine  60 mg Oral Daily    heparin (porcine)  5,000 Units Subcutaneous Q8H    OLANZapine  10 mg Oral QHS    pregabalin  300 mg Oral BID    thiamine  100 mg Oral Daily       PRN:  acetaminophen, labetalol, lorazepam, sodium chloride 0.9%, sodium chloride 0.9%    Home Medications:  Prior to Admission medications    Medication Sig Start Date End Date Taking? Authorizing Provider   amLODIPine (NORVASC) 5 MG tablet Take 1 tablet (5 mg total) by mouth once daily. 9/22/18 9/22/19  Gaby Rainey DNP   DULoxetine (CYMBALTA) 60 MG capsule Take 1 capsule (60 mg total) by mouth 2 (two) times daily. 9/21/18 9/21/19  Gaby Rainey DNP   gabapentin (NEURONTIN) 400 MG capsule Take 2 capsules (800 mg total) by mouth 3 (three) times daily. 9/21/18 9/21/19  Gaby Rainey DNP   lamoTRIgine (LAMICTAL) 200 MG tablet Take 1 tablet (200 mg total) by mouth 2 (two) times daily. 9/21/18 9/21/19  Gaby Rainey DNP   metoprolol tartrate (LOPRESSOR) 50 MG tablet Take 50 mg by mouth 2 (two) times daily.    Historical Provider, MD   mirtazapine (REMERON) 15 MG tablet Take 1 tablet (15 mg total) by mouth every evening. 9/21/18 9/21/19  Gaby Rainey DNP       Hospital Course: 01/26/2020  NAEO. Slightly bradycardic (50's). On Valium 10mg q6hrs. No withdrawal PRNs required in past 24 hours.  Patient seen at bedside. Calm, cooperative, alert, and awake, though somewhat of a poor historian due to overall tiredness and impaired recent memory.  Complaining of continued headache that worsens with movement.  Endorsing withdrawal symptoms including waking up diaphoretic and tremulousness. No diaphoresis or overt tremors noted.  Oriented to person, place,  "month, year, situation.  0 errors on SAVEAHAART.  Denies SI, HI, AVH.  Expresses interest in rehabilitation upon medical clearance.    Interval History: see hospital course    Family History     Problem Relation (Age of Onset)    Benign prostatic hyperplasia Father    Breast cancer Mother (53)    Down syndrome Daughter    Hashimoto's thyroiditis Sister    Hypertension Father    Irritable bowel syndrome Sister    No Known Problems Sister        Tobacco Use    Smoking status: Current Every Day Smoker     Packs/day: 0.05     Types: Cigarettes    Smokeless tobacco: Never Used    Tobacco comment: Currently uses nicorette gum and lozenges.   Substance and Sexual Activity    Alcohol use: No    Drug use: No    Sexual activity: Yes     Partners: Female     Birth control/protection: None     Psychotherapeutics (From admission, onward)    Start     Stop Route Frequency Ordered    01/25/20 2017  lorazepam injection 2 mg      -- IV Every hour PRN 01/25/20 2017 01/25/20 2000  diazePAM tablet 10 mg      -- Oral Every 6 hours 01/25/20 1640    01/25/20 0900  buPROPion 24 hr tablet 300 mg      -- Oral Daily 01/24/20 1855 01/25/20 0900  DULoxetine DR capsule 60 mg      -- Oral Daily 01/24/20 1855 01/24/20 2100  OLANZapine tablet 10 mg      -- Oral Nightly 01/24/20 1855           Objective:     Vital Signs (Most Recent):  Temp: 97.8 °F (36.6 °C) (01/26/20 0721)  Pulse: (!) 54(Simultaneous filing. User may not have seen previous data.) (01/26/20 0721)  Resp: 18 (01/26/20 0721)  BP: (!) 155/88 (01/26/20 0721)  SpO2: (!) 94 % (01/26/20 0721) Vital Signs (24h Range):  Temp:  [97.8 °F (36.6 °C)-98.7 °F (37.1 °C)] 97.8 °F (36.6 °C)  Pulse:  [53-80] 54  Resp:  [10-20] 18  SpO2:  [93 %-98 %] 94 %  BP: (132-161)/(77-98) 155/88     Height: 5' 8" (172.7 cm)  Weight: 81.6 kg (180 lb)  Body mass index is 27.37 kg/m².      Intake/Output Summary (Last 24 hours) at 1/26/2020 0847  Last data filed at 1/26/2020 0800  Gross per 24 hour " "  Intake 500 ml   Output 3000 ml   Net -2500 ml       Physical Exam   Psychiatric:   Orientation: oriented to self, place, disoriented to time, date  Speech: slowed, delayed, spontaneous  Language: english, fluent  Mood: "fine"  Affect: constricted  Thought Process: linear, goal-directed  Associations: intact  Thought Content: linear  Memory: impaired  Attention and Concentration: impaired  Fund of Knowledge: aware of current president  Insight: limited  Judgment: limited           Significant Labs: All pertinent labs within the past 24 hours have been reviewed.    Significant Imaging: I have reviewed all pertinent imaging results/findings within the past 24 hours.    Assessment/Plan:     Opioid use disorder severe on agonist therapy ; opioid withdrawal   Pt with history of polysubstance use (benzo, alcohol, opiates), receives MAT outpatient with recent refills per .    - Discussed with primary team about pt's outpatient MAT. Agree with team to implement Suboxone 4mg SL BID to help mitigate pt's opiate withdrawal s/sx. Will further assess in AM and augment treatment as needed.  - Ongoing  on cessation  - Pt interested in rehab once deemed medically cleared/stable  - Opiate withdrawal PRNs for comfort may include the following:  - Clonidine 0.1-0.3mg PO q6-q8  - Hold Clonidine for HR<60, Systolic<90, Diastolic<60  - Hydroxyzine 25mg PO QID PRN for rhinorrhea  - Gabapentin 100mg TID for anxiety  - Zofran 4-8mg PO PRN for nausea/vomiting  - Bentyl for diarrhea  - Meloxicam w/food for pain  - Encourage PO fluids      Alcohol use disorder severe ; alcohol withdrawal     · Recommend continued PO valium taper to tid then bid with withdrawal precautions and PRN Ativan with withdrawal parameters.  · Continue to assess for willingness for rehab upon discharge.         Case discussed with staff psychiatrist Dr. Sherri JASMINE.    Psychiatry to continue following. Please do not hesitate to contact us with any further " questions or concerns.      Brandt Theodore MD   Psychiatry  [facility]    Staff Psychiatrist Note: I, Mauricio Polanco MD have personally seen and evaluated the patient today , and reviewed the history and exam. I agree and concur with the current assessment and plan.

## 2020-01-26 NOTE — PROGRESS NOTES
[facility]  Vascular Neurology  Comprehensive Stroke Center  Progress Note    Assessment/Plan:     * Embolic stroke involving right middle cerebral artery  Luke Coley is a 44 y.o. male medical history of HTN, fibromyalgia, alcohol abuse, suicidal ideation, elevated liver enzymes and bipolar disorder who presented to ED intoxicated. CT ordered in ED due to complaints of HA. CT with R parietal infarct. Admitting patient to stroke unit for further workup. TTE with PFO vs ASD. MRI confirmed R MCA infarct and BL globus pallidus lesions (suggestive of heroin abuse). Etiology ESUS, differential includes ESUS, paradoxical embolus due to cardiac shunt vs drug induced. CTA head and neck unremarkable     Antithrombotics for secondary stroke prevention: Antiplatelets: Aspirin: 325 mg daily    Statins for secondary stroke prevention and hyperlipidemia, if present:   Statins: Atorvastatin- 10 mg daily, LFTs improved, starting lower dose due to history of alcoholism    Aggressive risk factor modification: HTN, Smoking, alcohol and drug use     Rehab efforts: The patient has been evaluated by a stroke team provider and the therapy needs have been fully considered based off the presenting complaints and exam findings. The following therapy evaluations are needed: PT evaluate and treat, OT evaluate and treat, SLP evaluate and treat, PM&R evaluate for appropriate placement    Diagnostics ordered/pending: None     VTE prophylaxis: Heparin 5000 units SQ every 8 hours    BP parameters: Infarct: No intervention, SBP <220        Right to left cardiac shunt  TTE with R to L shunt, PFO vs ASD  Possible source of stroke  UE LE pending  Will need outpatient follow up with Dr. Damico for possible closure    Drug abuse  Stroke risk factor  Patient reports snorting small amount of drugs last night he believes was heroine  Tox positive for barbiturates and opiates  History of psychiatry hospitalization and  alcohol/drug rehab in 2018  Continue valium taper and suboxone  Counseled on cessation, interested in rehab resources  Psychiatry consulted, appreciate recs    Essential hypertension  Stroke risk factor  Patient on amlodipine at home  Holding home BP meds  SBP <220  Labetalol prn  BP currently at goal    Cytotoxic cerebral edema  Area of cytotoxic cerebral edema identified when reviewing brain imaging in the territory of the R middle cerebral artery. There is mass effect associated with it. We will continue to monitor the patients clinical exam for any worsening of symptoms which may indicate expansion of the stroke or the area of the edema resulting in the clinical change. The pattern is suggestive of ESUS etiology.        Cephalalgia  Patient given multiple medications by ED staff for headache including, Fioricet, dexamethasone, benadryl, remeron, and ketorolac  Continue fioricet and tylenol  Continue valium taper and suboxone  Given 1 dose IV toradol 1/26    Elevated liver enzymes  History of transaminitis  Elevated liver enzymes on admission, trended down, starting low dose atorvastatin    Tobacco use disorder  Stroke risk factor   on cessation prior to discharge    Suicidal ideations  History of suicidal ideations  Will monitor   Continue home psychiatric medications    Alcohol abuse  Presented to ED intoxicated  Monitoring for signs and symptoms of withdrawal  CIWA ordered  Banana bag administered  Continue thiamine and valium taper  Counseled on cessations, expressing interest in rehab resources  Psychiatry consulted, appreciate recs    Anxiety and depression  Continue home medications         1/25 started on diazepam for possible withdrawal, continuing to complain of headache, psychiatry consulted   1/26 continuing valium taper, started suboxone yesterday due to persistent headache,     STROKE DOCUMENTATION        NIH Scale:  1a. Level of Consciousness: 0-->Alert, keenly responsive  1b. LOC  Questions: 0-->Answers both questions correctly  1c. LOC Commands: 0-->Performs both tasks correctly  2. Best Gaze: 0-->Normal  3. Visual: 0-->No visual loss  4. Facial Palsy: 1-->Minor paralysis (flattened nasolabial fold, asymmetry on smiling)  5a. Motor Arm, Left: 0-->No drift, limb holds 90 (or 45) degrees for full 10 secs  5b. Motor Arm, Right: 0-->No drift, limb holds 90 (or 45) degrees for full 10 secs  6a. Motor Leg, Left: 0-->No drift, leg holds 30 degree position for full 5 secs  6b. Motor Leg, Right: 0-->No drift, leg holds 30 degree position for full 5 secs  7. Limb Ataxia: 0-->Absent  8. Sensory: 1-->Mild-to-moderate sensory loss, patient feels pinprick is less sharp or is dull on the affected side, or there is a loss of superficial pain with pinprick, but patient is aware of being touched  9. Best Language: 0-->No aphasia, normal  10. Dysarthria: 0-->Normal  11. Extinction and Inattention (formerly Neglect): 0-->No abnormality  Total (NIH Stroke Scale): 2       Modified Clayton Score: 0  [place] Coma Scale:    ABCD2 Score:    [id number] Score:   HAS -BLED Score:   ICH Score:   Hunt & Godwin Classification:      Hemorrhagic change of an Ischemic Stroke: Does this patient have an ischemic stroke with hemorrhagic changes? No     Neurologic Chief Complaint: R MCA    Subjective:     Interval History: Patient is seen for follow-up neurological assessment and treatment recommendations: LIZZIE, complaining of persistent headache, asymptomatic bradycardia    HPI, Past Medical, Family, and Social History remains the same as documented in the initial encounter.     Review of Systems   Constitutional: Negative for chills and fever.   Eyes: Negative for visual disturbance.   Respiratory: Negative for cough.    Cardiovascular: Negative for chest pain.   Gastrointestinal: Negative for nausea and vomiting.   Neurological: Positive for facial asymmetry, numbness and headaches. Negative for speech difficulty and weakness.    Psychiatric/Behavioral: Negative for agitation.     Scheduled Meds:   aspirin  325 mg Oral Daily    buprenorphine-naloxone 2-0.5 mg  1 Film Sublingual BID    buPROPion  300 mg Oral Daily    diazePAM  10 mg Oral Q6H    DULoxetine  60 mg Oral Daily    heparin (porcine)  5,000 Units Subcutaneous Q8H    metoclopramide HCl  10 mg Intravenous Once    OLANZapine  10 mg Oral QHS    pregabalin  300 mg Oral BID    thiamine  100 mg Oral Daily     Continuous Infusions:   sodium chloride 0.9%       PRN Meds:acetaminophen, labetalol, lorazepam, sodium chloride 0.9%, sodium chloride 0.9%    Objective:     Vital Signs (Most Recent):  Temp: 96.2 °F (35.7 °C) (01/26/20 1152)  Pulse: (!) 58 (01/26/20 1152)  Resp: 18 (01/26/20 1152)  BP: (!) 141/92 (01/26/20 1152)  SpO2: 95 % (01/26/20 1152)  BP Location: Right arm    Vital Signs Range (Last 24H):  Temp:  [96.2 °F (35.7 °C)-98.7 °F (37.1 °C)]   Pulse:  [53-66]   Resp:  [10-19]   BP: (132-161)/(77-92)   SpO2:  [93 %-98 %]   BP Location: Right arm    Physical Exam   Constitutional: He appears well-developed and well-nourished. No distress.   HENT:   Head: Normocephalic and atraumatic.   Eyes: Pupils are equal, round, and reactive to light. EOM are normal.   Cardiovascular: Bradycardia present.   Pulmonary/Chest: Effort normal. No respiratory distress.   Musculoskeletal: Normal range of motion.   Neurological: He is alert.   Skin: Skin is warm and dry.   Vitals reviewed.      Neurological Exam:   LOC: alert  Attention Span: Good   Language: No aphasia  Articulation: No dysarthria  Orientation: Person, Place, Time   Visual Fields: Full  EOM (CN III, IV, VI): Full/intact  Pupils (CN II, III): PERRL  Facial Sensation (CN V): Facial sensory loss  Facial Movement (CN VII): Lower facial weakness on the Left  Motor: Arm left  Normal 5/5  Leg left  Normal 5/5  Arm right  Normal 5/5  Leg right Normal 5/5  Cebellar: No evidence of appendicular or axial ataxia  Sensation:  Matthew-anesthesia left  Tone: Normal tone throughout    Laboratory:  CMP:   Recent Labs   Lab 01/26/20  0405   CALCIUM 8.8   ALBUMIN 3.5   PROT 6.3      K 3.6   CO2 29      BUN 8   CREATININE 0.9   ALKPHOS 66   ALT 41   AST 57*   BILITOT 0.7     CBC:   Recent Labs   Lab 01/26/20  0405   WBC 8.98   RBC 4.00*   HGB 12.3*   HCT 37.5*   *   MCV 94   MCH 30.8   MCHC 32.8     Lipid Panel:   Recent Labs   Lab 01/24/20  1127   CHOL 128   LDLCALC 54.0*   HDL 53   TRIG 105     Coagulation:   Recent Labs   Lab 01/25/20  0313   INR 1.0   APTT 29.3     Platelet Aggregation Study: No results for input(s): PLTAGG, PLTAGINTERP, PLTAGREGLACO, ADPPLTAGGREG in the last 168 hours.  Hgb A1C:   Recent Labs   Lab 01/24/20  1545   HGBA1C 5.2     TSH:   Recent Labs   Lab 01/24/20  1127   TSH 0.625       Diagnostic Results     Brain Imaging   MRI Brain. Date: 01/25/20  Large right subtotal MCA vascular territory infarct without mass effect or hemorrhage.    CT Head. Date: 01/25/20  Moderate size region of parenchymal hypoattenuation and sulcal effacement involving the right parietooccipital region suggestive of evolving recent infarct without evidence of hemorrhagic conversion.  Additional, punctate hypodensities are present within the bilateral basal ganglia which appear more conspicuous from prior examination and additional regions of acute ischemia are not excluded.  Consider further evaluation with MRI for further assessment if there are no patient contraindications.    No evidence of midline shift or hydrocephalus.    CT Head. Date: 01/24/20  Mild moderate hypoattenuation with sulcal effacement right superior occipital/inferior parietal lobes peripherally concerning for recent infarction though indeterminate.  Clinical correlation and further evaluation with MRI recommended.  There is no evidence for hydrocephalus or acute intracranial hemorrhage.    Vessel Imaging   CTA head and neck. Date: 01/25/20  No evidence of  high-grade stenosis or major vascular occlusion.    Evolving subtotal right MCA infarct.  Evolving acute bilateral basal ganglia lacunar infarcts    Age advanced cerebral volume loss.    MRA. Date: 01/25/20  Technically very limited study.  Abnormal right carotid bulb, artifact versus stenosis.  Correlate with carotid Doppler.    Cardiac Imaging   TTE with bubble contrast. Date: 01/24/20  · Concentric left ventricular remodeling.  · Normal left ventricular systolic function. The estimated ejection fraction is 55%.  · The right ventricle is at the upper limit of normal in size with normal right ventricular systolic function.  · Normal LV diastolic function.  · Normal central venous pressure (3 mmHg).  · With agitated saline administration, there is evidence of considerable right to left shunting consistent with a large PFO or small ASD.      Allison Gallegos PA-C  Comprehensive Stroke Center  Department of Vascular Neurology   [facility]

## 2020-01-26 NOTE — NURSING
Provider made aware of pts bradycardia into the 40's. Pt is asymptomatic and now complaining of headache/pain. Okay to give lyrica 300mg now for pain. Continue with current regimen per provider including next dose of lyrica at 9 am. Will continue to monitor.

## 2020-01-26 NOTE — PLAN OF CARE
POC regarding pain regimen reviewed with patient. Pt verbalized understanding. Questions and concerns addressed. MD made aware and have made changes to pain management. Progression towards pain goals pending. Will continue to monitor. See flowsheets for full assessments and VS info.

## 2020-01-26 NOTE — ASSESSMENT & PLAN NOTE
· Recommend continued PO valium taper with withdrawal precautions and PRN Ativan with withdrawal parameters.  · Continue to assess for willingness for rehab upon discharge.

## 2020-01-26 NOTE — ASSESSMENT & PLAN NOTE
Patient given multiple medications by ED staff for headache including, Fioricet, dexamethasone, benadryl, remeron, and ketorolac  Continue fioricet and tylenol  Continue valium taper and suboxone  Given 1 dose IV toradol 1/26

## 2020-01-27 PROBLEM — F19.94 SUBSTANCE INDUCED MOOD DISORDER: Status: ACTIVE | Noted: 2020-01-27

## 2020-01-27 PROBLEM — F10.939 ALCOHOL WITHDRAWAL SYNDROME WITH COMPLICATION: Status: ACTIVE | Noted: 2020-01-27

## 2020-01-27 PROBLEM — F11.20 OPIOID USE DISORDER, SEVERE, ON MAINTENANCE THERAPY, DEPENDENCE: Status: ACTIVE | Noted: 2020-01-24

## 2020-01-27 LAB — TROPONIN I SERPL DL<=0.01 NG/ML-MCNC: 0.04 NG/ML (ref 0–0.03)

## 2020-01-27 PROCEDURE — 36415 COLL VENOUS BLD VENIPUNCTURE: CPT

## 2020-01-27 PROCEDURE — 11000001 HC ACUTE MED/SURG PRIVATE ROOM

## 2020-01-27 PROCEDURE — 84484 ASSAY OF TROPONIN QUANT: CPT

## 2020-01-27 PROCEDURE — 25000003 PHARM REV CODE 250: Performed by: NURSE PRACTITIONER

## 2020-01-27 PROCEDURE — 99233 PR SUBSEQUENT HOSPITAL CARE,LEVL III: ICD-10-PCS | Mod: ,,, | Performed by: PSYCHIATRY & NEUROLOGY

## 2020-01-27 PROCEDURE — 92523 SPEECH SOUND LANG COMPREHEN: CPT

## 2020-01-27 PROCEDURE — 25000003 PHARM REV CODE 250: Performed by: PHYSICIAN ASSISTANT

## 2020-01-27 PROCEDURE — 99252 PR INITIAL INPATIENT CONSULT,LEVL II: ICD-10-PCS | Mod: ,,, | Performed by: NURSE PRACTITIONER

## 2020-01-27 PROCEDURE — 63600175 PHARM REV CODE 636 W HCPCS: Performed by: PHYSICIAN ASSISTANT

## 2020-01-27 PROCEDURE — 99232 PR SUBSEQUENT HOSPITAL CARE,LEVL II: ICD-10-PCS | Mod: ,,, | Performed by: PSYCHIATRY & NEUROLOGY

## 2020-01-27 PROCEDURE — 99233 SBSQ HOSP IP/OBS HIGH 50: CPT | Mod: ,,, | Performed by: PSYCHIATRY & NEUROLOGY

## 2020-01-27 PROCEDURE — 99232 SBSQ HOSP IP/OBS MODERATE 35: CPT | Mod: ,,, | Performed by: PSYCHIATRY & NEUROLOGY

## 2020-01-27 PROCEDURE — 99252 IP/OBS CONSLTJ NEW/EST SF 35: CPT | Mod: ,,, | Performed by: NURSE PRACTITIONER

## 2020-01-27 RX ORDER — BUPRENORPHINE AND NALOXONE 2; .5 MG/1; MG/1
2 FILM, SOLUBLE BUCCAL; SUBLINGUAL 2 TIMES DAILY
Status: DISCONTINUED | OUTPATIENT
Start: 2020-01-27 | End: 2020-01-27

## 2020-01-27 RX ORDER — MELOXICAM 7.5 MG/1
7.5 TABLET ORAL
Status: DISCONTINUED | OUTPATIENT
Start: 2020-01-27 | End: 2020-01-27

## 2020-01-27 RX ORDER — BUPRENORPHINE AND NALOXONE 4; 1 MG/1; MG/1
1 FILM, SOLUBLE BUCCAL; SUBLINGUAL DAILY
Status: DISCONTINUED | OUTPATIENT
Start: 2020-01-27 | End: 2020-01-27

## 2020-01-27 RX ORDER — LORAZEPAM 2 MG/ML
2 INJECTION INTRAMUSCULAR
Status: DISCONTINUED | OUTPATIENT
Start: 2020-01-27 | End: 2020-01-27

## 2020-01-27 RX ORDER — BUPRENORPHINE HYDROCHLORIDE AND NALOXONE HYDROCHLORIDE DIHYDRATE 8; 2 MG/1; MG/1
8 TABLET SUBLINGUAL 2 TIMES DAILY
Status: DISCONTINUED | OUTPATIENT
Start: 2020-01-27 | End: 2020-01-28

## 2020-01-27 RX ORDER — FOLIC ACID 1 MG/1
1 TABLET ORAL DAILY
Status: DISCONTINUED | OUTPATIENT
Start: 2020-01-27 | End: 2020-01-31 | Stop reason: HOSPADM

## 2020-01-27 RX ORDER — BUPRENORPHINE AND NALOXONE 2; .5 MG/1; MG/1
1 FILM, SOLUBLE BUCCAL; SUBLINGUAL ONCE
Status: COMPLETED | OUTPATIENT
Start: 2020-01-27 | End: 2020-01-27

## 2020-01-27 RX ORDER — BUPRENORPHINE AND NALOXONE 2; .5 MG/1; MG/1
2 FILM, SOLUBLE BUCCAL; SUBLINGUAL 3 TIMES DAILY
Status: DISCONTINUED | OUTPATIENT
Start: 2020-01-27 | End: 2020-01-27

## 2020-01-27 RX ORDER — DIAZEPAM 5 MG/1
5 TABLET ORAL EVERY 6 HOURS PRN
Status: DISCONTINUED | OUTPATIENT
Start: 2020-01-27 | End: 2020-01-27

## 2020-01-27 RX ORDER — DIAZEPAM 5 MG/1
5 TABLET ORAL 3 TIMES DAILY
Status: DISCONTINUED | OUTPATIENT
Start: 2020-01-27 | End: 2020-01-28

## 2020-01-27 RX ORDER — LORAZEPAM 2 MG/ML
2 INJECTION INTRAMUSCULAR
Status: DISCONTINUED | OUTPATIENT
Start: 2020-01-27 | End: 2020-01-31 | Stop reason: HOSPADM

## 2020-01-27 RX ORDER — DIVALPROEX SODIUM 250 MG/1
500 TABLET, DELAYED RELEASE ORAL EVERY 12 HOURS
Status: DISCONTINUED | OUTPATIENT
Start: 2020-01-27 | End: 2020-01-31 | Stop reason: HOSPADM

## 2020-01-27 RX ORDER — BUPRENORPHINE AND NALOXONE 2; .5 MG/1; MG/1
1 FILM, SOLUBLE BUCCAL; SUBLINGUAL 2 TIMES DAILY
Status: DISCONTINUED | OUTPATIENT
Start: 2020-01-27 | End: 2020-01-27

## 2020-01-27 RX ADMIN — ACETAMINOPHEN 500 MG: 500 TABLET ORAL at 12:01

## 2020-01-27 RX ADMIN — DIAZEPAM 10 MG: 5 TABLET ORAL at 06:01

## 2020-01-27 RX ADMIN — ATORVASTATIN CALCIUM 10 MG: 10 TABLET, FILM COATED ORAL at 09:01

## 2020-01-27 RX ADMIN — DULOXETINE HYDROCHLORIDE 60 MG: 30 CAPSULE, DELAYED RELEASE ORAL at 09:01

## 2020-01-27 RX ADMIN — DIVALPROEX SODIUM 500 MG: 250 TABLET, DELAYED RELEASE ORAL at 09:01

## 2020-01-27 RX ADMIN — Medication 100 MG: at 09:01

## 2020-01-27 RX ADMIN — HEPARIN SODIUM 5000 UNITS: 5000 INJECTION INTRAVENOUS; SUBCUTANEOUS at 01:01

## 2020-01-27 RX ADMIN — DIAZEPAM 5 MG: 5 TABLET ORAL at 02:01

## 2020-01-27 RX ADMIN — DIAZEPAM 5 MG: 5 TABLET ORAL at 09:01

## 2020-01-27 RX ADMIN — FOLIC ACID 1 MG: 1 TABLET ORAL at 01:01

## 2020-01-27 RX ADMIN — HEPARIN SODIUM 5000 UNITS: 5000 INJECTION INTRAVENOUS; SUBCUTANEOUS at 10:01

## 2020-01-27 RX ADMIN — BUPROPION HYDROCHLORIDE 300 MG: 300 TABLET, FILM COATED, EXTENDED RELEASE ORAL at 09:01

## 2020-01-27 RX ADMIN — HEPARIN SODIUM 5000 UNITS: 5000 INJECTION INTRAVENOUS; SUBCUTANEOUS at 06:01

## 2020-01-27 RX ADMIN — PREGABALIN 300 MG: 150 CAPSULE ORAL at 09:01

## 2020-01-27 RX ADMIN — DIVALPROEX SODIUM 500 MG: 250 TABLET, EXTENDED RELEASE ORAL at 02:01

## 2020-01-27 RX ADMIN — BUPRENORPHINE AND NALOXONE 1 TABLET: 8; 2 TABLET SUBLINGUAL at 10:01

## 2020-01-27 RX ADMIN — DIAZEPAM 5 MG: 5 TABLET ORAL at 12:01

## 2020-01-27 RX ADMIN — BUPRENORPHINE HYDROCHLORIDE, NALOXONE HYDROCHLORIDE 1 FILM: 2; .5 FILM, SOLUBLE BUCCAL; SUBLINGUAL at 01:01

## 2020-01-27 RX ADMIN — BUPRENORPHINE HYDROCHLORIDE, NALOXONE HYDROCHLORIDE 1 FILM: 2; .5 FILM, SOLUBLE BUCCAL; SUBLINGUAL at 09:01

## 2020-01-27 RX ADMIN — ACETAMINOPHEN 500 MG: 500 TABLET ORAL at 09:01

## 2020-01-27 RX ADMIN — ACETAMINOPHEN 500 MG: 500 TABLET ORAL at 06:01

## 2020-01-27 RX ADMIN — THERA TABS 1 TABLET: TAB at 01:01

## 2020-01-27 RX ADMIN — ASPIRIN 325 MG ORAL TABLET 325 MG: 325 PILL ORAL at 09:01

## 2020-01-27 NOTE — HOSPITAL COURSE
1/24/20:  Evaluated by SLP.  SLP recommendation: regular diet and thin liquids.  Cognitive-language evaluation pending.  1/25/20:  Evaluated by OT.  Bed mobility SBA.  Sit to stand SBA.  Functional ambulation CGA.  LBD SBA.  1/26/20:  Evaluated by PT.  Bed mobility SV.  Sit to stand SBA.  Ambulated 50 ft CGA 2/2 mild LOB and gait unsteadiness + 50 ft SBA.  1/27/20:  Evaluated by SLP.  Found to have mild higher level cognitive-linguistic impairment, session limited by pain 2/2 HA.

## 2020-01-27 NOTE — ASSESSMENT & PLAN NOTE
-  Presented for severe headache and AMS  -  CTH impression showed R parietal infarct  -  MRI brain impression confirmed R MCA infarct with BL globus pallidus lesions  -  Management per Vascular Neurology--> Etiology ESUS  See hospital course for functional, cognitive/speech/language, and nutrition/swallow status.    Recommendations  -  Encourage mobility, OOB in chair, and early ambulation as appropriate   -  PT, OT, SLP evaluate and treat  -  Monitor mood and sleep disturbances and establish consistent sleep-wake cycle  -  Monitor for bowel and bladder dysfunction  -  Monitor for shoulder pain/subluxation and spasticity  -  DVT prophylaxis if appropriate  -  Monitor for and prevent skin breakdown and pressure ulcers  · Early mobility, repositioning/weight shifting every 20-30 minutes when sitting, turn patient every 2 hours, proper mattress/overlay and chair cushioning, pressure relief/heel protector boots

## 2020-01-27 NOTE — PLAN OF CARE
Problem: Fall Injury Risk  Goal: Absence of Fall and Fall-Related Injury  Outcome: Ongoing, Progressing  Intervention: Identify and Manage Contributors to Fall Injury Risk  Flowsheets (Taken 1/27/2020 1753)  Self-Care Promotion: BADL personal objects within reach  Medication Review/Management: high risk medications identified     Problem: Adjustment to Illness (Stroke, Ischemic/Transient Ischemic Attack)  Goal: Optimal Coping  Outcome: Ongoing, Progressing  Intervention: Support Patient/Family Psychosocial Response to Stroke  Flowsheets (Taken 1/27/2020 1753)  Supportive Measures: active listening utilized; self-care encouraged  Family/Support System Care: caregiver stress acknowledged
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
## 2020-01-27 NOTE — PLAN OF CARE
CM met with patient in room for Discharge Planning Assessment.  Per patient,  patient lives with friend in a SSH with 0 steps to enter.   Per patient, patient was independent with ADLS and used no DME for ambulation.  Per patient, the patient will have assistance from friend upon discharge.   Discharge Planning Booklet given to patient/family and discussed.  All questions addressed.     01/27/20 153   Discharge Assessment   Assessment Type Discharge Planning Assessment   Confirmed/corrected address and phone number on facesheet? Yes   Assessment information obtained from? Patient   Expected Length of Stay (days) 5   Communicated expected length of stay with patient/caregiver yes   Prior to hospitilization cognitive status: Alert/Oriented   Prior to hospitalization functional status: Independent   Current cognitive status: Alert/Oriented   Current Functional Status: Needs Assistance   Lives With friend(s)   Able to Return to Prior Arrangements yes   Is patient able to care for self after discharge? Yes   Who are your caregiver(s) and their phone number(s)? Oren Douglas (roommate) [phone]   Patient's perception of discharge disposition home or selfcare   Readmission Within the Last 30 Days no previous admission in last 30 days   Patient currently being followed by outpatient case management? No   Patient currently receives any other outside agency services? No   Equipment Currently Used at Home none   Do you have any problems affording any of your prescribed medications? No   Is the patient taking medications as prescribed? yes   Does the patient have transportation home? Yes   Transportation Anticipated family or friend will provide  (oren)   Dialysis Name and Scheduled days na   Does the patient receive services at the Coumadin Clinic? No   Discharge Plan A Home   Discharge Plan B Home Health   DME Needed Upon Discharge  other (see comments)  (tbd)   Patient/Family in Agreement with Plan yes          PCP:  Non  [facility] provider Dr. Koko Stockton in [place]  Primary Doctor No      Pharmacy:    [id number] [place], [place] - [address] AT [facility] [address]  [address]  [place] [address]  Phone: [phone] Fax: [phone]    [facility] - [place], [place] - [address]  [address] [facility] [place] [address]  Phone: [phone] Fax: [phone]    [id number] - [place], [place] - [address]  [address]  [place] [address]  Phone: [phone] Fax: [phone]    [id number] [place] - [address]  [address]  [address]  [facility] [place] [address]  Phone: [phone] Fax: [phone]    [id number] - [place], [place] - [address] [facility] [address] [place] [address]  [address]  [place] [address]  Phone: [phone] Fax: [phone]    [id number] - [place], [place] - [address] [facility] [address]  [address]  [facility] [address]  Phone: [phone] Fax: [phone]        Emergency Contacts:  Extended Emergency Contact Information  Primary Emergency Contact: Oren Douglas  Mobile Phone: [phone]  Relation: Friend   needed? No  Secondary Emergency Contact: Leidy Coley   [place] [facility]  Mobile Phone: [phone]  Relation: Spouse      Insurance:    Payor: BLUE CROSS BLUE SHIELD / Plan: [place] HMO / Product Type: HMO /       Sylwia Wong RN  Case Management  Ext: [phone]  01/27/2020  3:50 PM

## 2020-01-27 NOTE — HPI
Luke Coley is a 44-year-old male with PMHx of fibromyalgia, lumbar back pain, IBS, anxiety, depression, bipolar disorder, SI, and EtOH and substance abuse  [place] [facility] [place] for severe headache and AMS.  On arrival, toxicology positive for barbiturates and opiates.  CTH impression showed R parietal infarct.  Admitted to Vascular Neurology.  MRI brain impression confirmed R MCA infarct with BL globus pallidus lesions.  Etiology ESUS.  Psych following for severe EtOH use disorder and EtOH withdrawal.      Functional History: Patient currently staying with a friend in a single story home without steps to enter.  Recently  from his wife.  Prior to admission, he was (I) with ADLs and mobility.  He is right handed.  DME: none.
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
## 2020-01-27 NOTE — PLAN OF CARE
Problem: SLP Goal  Goal: SLP Goal  Description  Speech Language Pathology Goals  Goals expected to be met by 1/31/2020  1. Pt will tolerate a regular diet with thin liquids w/o overt S/S aspiration, I'ly  2. Pt will participate in further assessment of reading, writing and visospatial skills  3. Pt will complete fx math tasks for time management with 90% accuracy, MOD I  4. Pt will recall at least 2/3 unrelated items for immediate recall and post 3 minute filled delay, 90% of attempts, MOD I  5. Educate Pt and family on S/S aspiration and aspiration precautions       Outcome: Ongoing, Progressing    Cognitive linguistic assessment initiated. Pt presents with mild, higher-level cognitive impairment. Pain impacted endurance. RN aware. ST to continue to follow.     NED Haskins., [facility]-SLP  Speech-Language Pathology  Pager: [phone]  1/27/2020

## 2020-01-27 NOTE — SUBJECTIVE & OBJECTIVE
"Interval History: see hospital course    Family History     Problem Relation (Age of Onset)    Benign prostatic hyperplasia Father    Breast cancer Mother (53)    Down syndrome Daughter    Hashimoto's thyroiditis Sister    Hypertension Father    Irritable bowel syndrome Sister    No Known Problems Sister        Tobacco Use    Smoking status: Current Every Day Smoker     Packs/day: 0.05     Types: Cigarettes    Smokeless tobacco: Never Used    Tobacco comment: Currently uses nicorette gum and lozenges.   Substance and Sexual Activity    Alcohol use: No    Drug use: No    Sexual activity: Yes     Partners: Female     Birth control/protection: None     Psychotherapeutics (From admission, onward)    Start     Stop Route Frequency Ordered    01/27/20 1148  diazePAM tablet 5 mg      -- Oral Every 6 hours PRN 01/27/20 1048    01/25/20 0900  buPROPion 24 hr tablet 300 mg      -- Oral Daily 01/24/20 1855 01/25/20 0900  DULoxetine DR capsule 60 mg      -- Oral Daily 01/24/20 1855 01/24/20 2100  OLANZapine tablet 10 mg      -- Oral Nightly 01/24/20 1855           Review of Systems   Constitutional: Positive for diaphoresis.   Musculoskeletal: Positive for myalgias.   Neurological: Positive for headaches. Negative for tremors and seizures.   Psychiatric/Behavioral: Negative for dysphoric mood, hallucinations and suicidal ideas.     Objective:     Vital Signs (Most Recent):  Temp: 98.2 °F (36.8 °C) (01/27/20 0502)  Pulse: (!) 57 (01/27/20 1103)  Resp: 18 (01/27/20 0502)  BP: 119/70 (01/27/20 0502)  SpO2: 95 % (01/27/20 0502) Vital Signs (24h Range):  Temp:  [96.1 °F (35.6 °C)-98.2 °F (36.8 °C)] 98.2 °F (36.8 °C)  Pulse:  [54-63] 57  Resp:  [18] 18  SpO2:  [90 %-97 %] 95 %  BP: (119-164)/() 119/70     Height: 5' 8" (172.7 cm)  Weight: 81.6 kg (180 lb)  Body mass index is 27.37 kg/m².      Intake/Output Summary (Last 24 hours) at 1/27/2020 1112  Last data filed at 1/26/2020 0865  Gross per 24 hour   Intake 480 ml " "  Output --   Net 480 ml       Physical Exam   Constitutional: He appears well-developed and well-nourished.   Appears mildly uncomfortable   Neurological:   No tremors in BUE   Psychiatric:   Mental Status Exam:  Appearance: mildly uncomfortable, good hygiene and grooming, dressed in hospital gown, average weight  Behavior/Cooperation: calm, cooperative  Speech: normal rate/tone/volume  Language: english  Mood: "okay"  Affect: constricted  Thought Process: goal-directed  Associations: no ADRIÁN  Thought Content: denies SI/HI/AVH  Sensorium: alert, awake   Orientation: person, place, month, situation. Not oriented to date  Memory: remote intact, recent impaired  Attention/Concentration: somewhat impaired, could not spell WORLD backwards  Fund of Knowledge: intact   Abstraction: did not assess  Insight: fair to limited  Judgement: fair to limited   Nursing note and vitals reviewed.       Significant Labs:   Last 24 Hours:   Recent Lab Results       01/27/20  0811        Troponin I 0.044  Comment:  The reference interval for Troponin I represents the 99th percentile   cutoff   for our facility and is consistent with 3rd generation assay   performance.             Significant Imaging: I have reviewed all pertinent imaging results/findings within the past 24 hours.  "
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
## 2020-01-27 NOTE — ASSESSMENT & PLAN NOTE
Pt with history of polysubstance use (benzos, alcohol, opiates), receives MAT outpatient with recent refills per  (last filled Zubsolv 5.7-1.4 mg TID #90 on 12/26/19).    - Initially restarted on Suboxone 2 mg BID here with order placed to increase to 4 mg BID today. Agree with continuation of Suboxone here, and going up on dose closer to home dose may help with current withdrawal symptoms.   - Opiate withdrawal PRNs for comfort may include the following:  - Clonidine 0.1-0.3mg PO q6-q8 (hold Clonidine for HR<60, Systolic<90, Diastolic<60)  - Hydroxyzine 25mg PO QID PRN for rhinorrhea  - Gabapentin 100mg TID for anxiety  - Zofran 4-8mg PO PRN for nausea/vomiting  - Bentyl for diarrhea  - Meloxicam w/food for pain  - Encourage PO fluids

## 2020-01-27 NOTE — ASSESSMENT & PLAN NOTE
· Would recommend to continue Valium taper at this time: consider 5 mg TID today 1/27 --> 5 mg BID tomorrow 1/28 --> 5 mg once daily on 1/29  · Continue withdrawal precautions (vital signs and CIWA Q4H) and PRN Ativan with withdrawal parameters (CIWA >8, SBP >160, DBP >100, HR >110)  · Continue to assess for willingness for rehab upon discharge. Today he denies wanting to go to rehab

## 2020-01-27 NOTE — PLAN OF CARE
01/27/20 1538   Post-Acute Status   Post-Acute Authorization Placement   Post-Acute Placement Status Awaiting Internal Medical Clearance   Discharge Delays None known at this time     Sylwia Wong RN  Case Management  Ext: [phone]  01/27/2020  3:38 PM

## 2020-01-28 PROBLEM — S01.00XA SCALP WOUND: Status: ACTIVE | Noted: 2020-01-28

## 2020-01-28 PROCEDURE — 94761 N-INVAS EAR/PLS OXIMETRY MLT: CPT

## 2020-01-28 PROCEDURE — 99253 IP/OBS CNSLTJ NEW/EST LOW 45: CPT | Mod: ,,, | Performed by: DERMATOLOGY

## 2020-01-28 PROCEDURE — 25000003 PHARM REV CODE 250: Performed by: NURSE PRACTITIONER

## 2020-01-28 PROCEDURE — 97116 GAIT TRAINING THERAPY: CPT | Mod: CQ

## 2020-01-28 PROCEDURE — 25000003 PHARM REV CODE 250: Performed by: PHYSICIAN ASSISTANT

## 2020-01-28 PROCEDURE — 63600175 PHARM REV CODE 636 W HCPCS: Performed by: PHYSICIAN ASSISTANT

## 2020-01-28 PROCEDURE — 99253 PR INITIAL INPATIENT CONSULT,LEVL III: ICD-10-PCS | Mod: ,,, | Performed by: DERMATOLOGY

## 2020-01-28 PROCEDURE — 87077 CULTURE AEROBIC IDENTIFY: CPT

## 2020-01-28 PROCEDURE — 11000001 HC ACUTE MED/SURG PRIVATE ROOM

## 2020-01-28 PROCEDURE — 97530 THERAPEUTIC ACTIVITIES: CPT

## 2020-01-28 PROCEDURE — 99233 SBSQ HOSP IP/OBS HIGH 50: CPT | Mod: ,,, | Performed by: PSYCHIATRY & NEUROLOGY

## 2020-01-28 PROCEDURE — 99232 SBSQ HOSP IP/OBS MODERATE 35: CPT | Mod: ,,, | Performed by: PSYCHIATRY & NEUROLOGY

## 2020-01-28 PROCEDURE — 87186 SC STD MICRODIL/AGAR DIL: CPT

## 2020-01-28 PROCEDURE — 87070 CULTURE OTHR SPECIMN AEROBIC: CPT

## 2020-01-28 PROCEDURE — 99232 PR SUBSEQUENT HOSPITAL CARE,LEVL II: ICD-10-PCS | Mod: ,,, | Performed by: PSYCHIATRY & NEUROLOGY

## 2020-01-28 PROCEDURE — 99233 PR SUBSEQUENT HOSPITAL CARE,LEVL III: ICD-10-PCS | Mod: ,,, | Performed by: PSYCHIATRY & NEUROLOGY

## 2020-01-28 RX ORDER — DIAZEPAM 5 MG/1
5 TABLET ORAL 2 TIMES DAILY
Status: COMPLETED | OUTPATIENT
Start: 2020-01-28 | End: 2020-01-28

## 2020-01-28 RX ORDER — DIAZEPAM 5 MG/1
5 TABLET ORAL 2 TIMES DAILY
Status: DISCONTINUED | OUTPATIENT
Start: 2020-01-28 | End: 2020-01-28

## 2020-01-28 RX ORDER — DIAZEPAM 5 MG/1
5 TABLET ORAL ONCE
Status: COMPLETED | OUTPATIENT
Start: 2020-01-29 | End: 2020-01-29

## 2020-01-28 RX ORDER — BUPRENORPHINE HYDROCHLORIDE AND NALOXONE HYDROCHLORIDE DIHYDRATE 2; .5 MG/1; MG/1
2 TABLET SUBLINGUAL 2 TIMES DAILY
Status: DISCONTINUED | OUTPATIENT
Start: 2020-01-28 | End: 2020-01-28

## 2020-01-28 RX ORDER — BUPRENORPHINE HYDROCHLORIDE AND NALOXONE HYDROCHLORIDE DIHYDRATE 2; .5 MG/1; MG/1
2 TABLET SUBLINGUAL 2 TIMES DAILY
Status: DISCONTINUED | OUTPATIENT
Start: 2020-01-28 | End: 2020-01-29

## 2020-01-28 RX ORDER — MUPIROCIN 20 MG/G
OINTMENT TOPICAL 2 TIMES DAILY
Status: DISCONTINUED | OUTPATIENT
Start: 2020-01-28 | End: 2020-01-31 | Stop reason: HOSPADM

## 2020-01-28 RX ORDER — BUPRENORPHINE HYDROCHLORIDE AND NALOXONE HYDROCHLORIDE DIHYDRATE 2; .5 MG/1; MG/1
2 TABLET SUBLINGUAL ONCE
Status: DISCONTINUED | OUTPATIENT
Start: 2020-01-30 | End: 2020-01-29

## 2020-01-28 RX ORDER — MUPIROCIN 20 MG/G
OINTMENT TOPICAL DAILY
Status: DISCONTINUED | OUTPATIENT
Start: 2020-01-28 | End: 2020-01-28

## 2020-01-28 RX ADMIN — HEPARIN SODIUM 5000 UNITS: 5000 INJECTION INTRAVENOUS; SUBCUTANEOUS at 05:01

## 2020-01-28 RX ADMIN — PREGABALIN 300 MG: 150 CAPSULE ORAL at 08:01

## 2020-01-28 RX ADMIN — BUPRENORPHINE AND NALOXONE 1 TABLET: 8; 2 TABLET SUBLINGUAL at 08:01

## 2020-01-28 RX ADMIN — PREGABALIN 300 MG: 150 CAPSULE ORAL at 09:01

## 2020-01-28 RX ADMIN — ASPIRIN 325 MG ORAL TABLET 325 MG: 325 PILL ORAL at 08:01

## 2020-01-28 RX ADMIN — THERA TABS 1 TABLET: TAB at 08:01

## 2020-01-28 RX ADMIN — ACETAMINOPHEN 500 MG: 500 TABLET ORAL at 06:01

## 2020-01-28 RX ADMIN — BUPRENORPHINE HYDROCHLORIDE AND NALOXONE HYDROCHLORIDE DIHYDRATE 1 TABLET: 2; .5 TABLET SUBLINGUAL at 09:01

## 2020-01-28 RX ADMIN — MUPIROCIN: 20 OINTMENT TOPICAL at 09:01

## 2020-01-28 RX ADMIN — DULOXETINE HYDROCHLORIDE 60 MG: 30 CAPSULE, DELAYED RELEASE ORAL at 08:01

## 2020-01-28 RX ADMIN — BUPROPION HYDROCHLORIDE 300 MG: 300 TABLET, FILM COATED, EXTENDED RELEASE ORAL at 08:01

## 2020-01-28 RX ADMIN — DIVALPROEX SODIUM 500 MG: 250 TABLET, DELAYED RELEASE ORAL at 08:01

## 2020-01-28 RX ADMIN — HEPARIN SODIUM 5000 UNITS: 5000 INJECTION INTRAVENOUS; SUBCUTANEOUS at 04:01

## 2020-01-28 RX ADMIN — FOLIC ACID 1 MG: 1 TABLET ORAL at 08:01

## 2020-01-28 RX ADMIN — ACETAMINOPHEN 500 MG: 500 TABLET ORAL at 04:01

## 2020-01-28 RX ADMIN — HEPARIN SODIUM 5000 UNITS: 5000 INJECTION INTRAVENOUS; SUBCUTANEOUS at 09:01

## 2020-01-28 RX ADMIN — DIVALPROEX SODIUM 500 MG: 250 TABLET, DELAYED RELEASE ORAL at 09:01

## 2020-01-28 RX ADMIN — DIAZEPAM 5 MG: 5 TABLET ORAL at 09:01

## 2020-01-28 RX ADMIN — Medication 100 MG: at 08:01

## 2020-01-28 RX ADMIN — DIAZEPAM 5 MG: 5 TABLET ORAL at 08:01

## 2020-01-28 RX ADMIN — ATORVASTATIN CALCIUM 10 MG: 10 TABLET, FILM COATED ORAL at 08:01

## 2020-01-28 RX ADMIN — ACETAMINOPHEN 500 MG: 500 TABLET ORAL at 10:01

## 2020-01-28 NOTE — ASSESSMENT & PLAN NOTE
Pt reported presented to the ED intoxicated.  Monitoring for signs and symptoms of withdrawal per CIWA protocol.  Banana bag administered. Thiamine, MTVN, folic acid in place.    Psychiatry consulted, appreciate recs  Continuing Valium taper per psych recs (too high risk to stop abruptly without gradual wean.)  Counseled pt on cessations. He initially expressed interest in possible inpatint rehab resources.

## 2020-01-28 NOTE — ASSESSMENT & PLAN NOTE
History of suicidal ideations  No evidence thus far currently but will monitor   Previous provider called pt's pharmacy and re-ordered home psychiatric medications.

## 2020-01-28 NOTE — PLAN OF CARE
Problem: Communication Impairment (Stroke, Ischemic/Transient Ischemic Attack)  Goal: Improved Communication Skills  Outcome: Ongoing, Progressing     Problem: Adjustment to Illness (Stroke, Ischemic/Transient Ischemic Attack)  Goal: Optimal Coping  Outcome: Ongoing, Progressing     Problem: Fall Injury Risk  Goal: Absence of Fall and Fall-Related Injury  Outcome: Ongoing, Progressing     POC reviewed with patient. All questions and concerns reviewed. VSS throughout shift. Fall/safety precautions implemented and maintained.  Bed locked in lowest position. Call bell within reach. Will continue to monitor.

## 2020-01-28 NOTE — ASSESSMENT & PLAN NOTE
Stroke risk factor  Patient reports snorting small amount of drugs night prior to presentation while he believes was heroine. Tox screen positive for barbiturates and opiates  History of psychiatric hospitalization and alcohol/drug rehab in 2018.   with 71 prescriptions, 18 prescribers, and 15 pharmacies.    Addiction Psychiatry consulted, Appreciate assistance.  - Pt on Zubsolv 5.7-1.4 mg TID at home. Discussed with psych resident; ordered 8-2mg BID regimen inpatient for now. If patient is withdrawing on 1/28, could consider increasing regimen to TID dosing.  - Psych discouraging against abrupt discontinuation of benzos due to high risk for further severe withdrawal syndromes. Pt was on Valium 10mg TID yesterday - switched to Valium 5mg TID today. Per psych, likely ok with 5mg BID tomorrow 1/28, etc. (see recs)  - Pt's psychiatrist is Preeti Logan, though [facility] psych team is not sure if pt was following as closely as he needed; may possibly need to provide pt a few day's doses of Suboxone at discharge.   - Need to ensure with CM that pt has close follow-up established with his psychiatrist prior to d/c.   -Plans to contact Dr. Logan for further information    - Pt was counseled on cessation. Previously showed interest in inpatient rehab; CM on-call gave list of options. Though per Psych, pt later less amenable. Need to continue reinforcing.

## 2020-01-28 NOTE — ASSESSMENT & PLAN NOTE
Pt with history of polysubstance use (benzos, alcohol, opiates) and initially reported taking MAT as an outpatient with recent refills confirmed per  (last filled Zubsolv 5.7-1.4 mg TID #90 on 12/26/19 from Dr. Preeti Logan). However, today it is unclear if he actually has had access to outpatient buprenorphine recently. Today pt reports that he had been out of buprenorphine for an unclear amount of time prior to admission (see hospital course). He will need to follow up with his outpatient provider to obtain buprenorphine after discharge (either Dr. Logan or Dr. Dong). If pt is not able to get an outpatient appt, he may need to be tapered off of Suboxone.     - Opiate withdrawal PRNs for comfort may include the following:  Bentyl 20 mg q6h PRN stomach cramps  Vistaril 50 mg TID PRN anxiety, insomnia, or itching  Ibuprofen 600 mg q6h PRN pain  Imodium 2 mg QID PRN diarrhea  Robaxin 500 mg QID PRN muscle spasms  Zofran 8 mg q8h PRN nausea  Clonidine 0.1 mg PO q4h PRN opiate withdrawal (hold Clonidine for HR<60, Systolic<90, Diastolic<60)  Afrin 0.05% nasal spray, 2 sprays BID PRN congestion for 3 days
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
## 2020-01-28 NOTE — HPI
44 y.o. man with medical history of HTN, fibromyalgia, alcohol abuse, suicidal ideation, elevated liver enzymes and bipolar disorder who presented to ED intoxicated. CT ordered in ED due to complaints of HA. CT with R parietal infarct. Admitted patient to stroke unit for further workup. MRI Brain confirmed R posterior MCA infarct, as well as LILIANA globus pallidus (basal ganglia) disease, a pattern often seen with opioid use. TTE with large PFO vs small ASD. Stroke etiology of unclear source but concerns of paradoxical embolus present due to cardiac shunt on echo.    Dermatology consulted for scalp lesion. Patient unsure of history of lesion but does believe it has been present for ~5 days. He is unable to remember acute trauma to the area but he was experiencing stroke symptoms on admission. Has history of EtOH abuse but EtOH was negative on presentation. Patient denies lesion being present for >1wk. He has never had anything similar and no treatments have been tried thus far. Unclear if lesion was present on admission but it was not noted on presentation. Per chart review patient's etiology of events leading up to admission varied and he still is unsure of what he was doing leading up to hospitalization.

## 2020-01-28 NOTE — SUBJECTIVE & OBJECTIVE
"Interval History: see hospital course    Family History     Problem Relation (Age of Onset)    Benign prostatic hyperplasia Father    Breast cancer Mother (53)    Down syndrome Daughter    Hashimoto's thyroiditis Sister    Hypertension Father    Irritable bowel syndrome Sister    No Known Problems Sister        Tobacco Use    Smoking status: Current Every Day Smoker     Packs/day: 0.05     Types: Cigarettes    Smokeless tobacco: Never Used    Tobacco comment: Currently uses nicorette gum and lozenges.   Substance and Sexual Activity    Alcohol use: No    Drug use: No    Sexual activity: Yes     Partners: Female     Birth control/protection: None     Psychotherapeutics (From admission, onward)    Start     Stop Route Frequency Ordered    01/28/20 2100  diazePAM tablet 5 mg      -- Oral 2 times daily 01/28/20 0942    01/27/20 1633  lorazepam injection 2 mg      -- IV Every hour PRN 01/27/20 1637    01/25/20 0900  buPROPion 24 hr tablet 300 mg      -- Oral Daily 01/24/20 1855 01/25/20 0900  DULoxetine DR capsule 60 mg      -- Oral Daily 01/24/20 1855           Review of Systems   Constitutional: Negative for diaphoresis.   Neurological: Positive for headaches. Negative for tremors and seizures.   Psychiatric/Behavioral: Negative for dysphoric mood, hallucinations, sleep disturbance and suicidal ideas. The patient is not nervous/anxious.      Objective:     Vital Signs (Most Recent):  Temp: 98.3 °F (36.8 °C) (01/28/20 0754)  Pulse: 77 (01/28/20 0754)  Resp: 18 (01/28/20 0754)  BP: (!) 139/93 (01/28/20 0754)  SpO2: (!) 92 % (01/28/20 0754) Vital Signs (24h Range):  Temp:  [97.5 °F (36.4 °C)-98.3 °F (36.8 °C)] 98.3 °F (36.8 °C)  Pulse:  [57-77] 77  Resp:  [18] 18  SpO2:  [92 %-98 %] 92 %  BP: (125-147)/(78-98) 139/93     Height: 5' 8" (172.7 cm)  Weight: 81.6 kg (180 lb)  Body mass index is 27.37 kg/m².      Intake/Output Summary (Last 24 hours) at 1/28/2020 0946  Last data filed at 1/27/2020 2100  Gross per 24 " "hour   Intake --   Output 1 ml   Net -1 ml       Physical Exam   Constitutional: He appears well-developed and well-nourished.   Appears mildly uncomfortable   Neurological:   No tremors in BUE   Psychiatric:   Mental Status Exam:  Appearance: mildly uncomfortable, holding head durign conversation, good hygiene and grooming, dressed in hospital gown, average weight  Behavior/Cooperation: calm, cooperative  Speech: normal rate/tone/volume  Language: english  Mood: "okay"  Affect: constricted  Thought Process: goal-directed  Associations: no ADRIÁN  Thought Content: denies SI/HI/AVH  Sensorium: alert, awake   Orientation: person, place, month, situation. Not oriented to date  Memory: remote intact, recent impaired  Attention/Concentration: somewhat impaired, could not spell WORLD backwards  Fund of Knowledge: intact   Abstraction: did not assess  Insight: fair to limited  Judgement: fair    Nursing note and vitals reviewed.         Significant Labs:   Last 24 Hours:   Recent Lab Results     None          Significant Imaging: I have reviewed all pertinent imaging results/findings within the past 24 hours.  "
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
## 2020-01-28 NOTE — PLAN OF CARE
Problem: Physical Therapy Goal  Goal: Physical Therapy Goal  Description  Goals to be met by: 20     Patient will increase functional independence with mobility by performin. Sit to stand transfer with [place]  2. Bed to chair transfer with [place] using no AD.  3. Gait  x 250 feet with Supervision using no AD.      Outcome: Ongoing, Progressing       Discharge Recommendations: Home no needs    Pt safe to transfer OOB/BTB and amb in hallway with RN/PCT and family: Use no AD with SBA/close sup of 1 person.    Goals remain appropriate.     Sylwia Scott, PTA.   [phone]   2020

## 2020-01-28 NOTE — ASSESSMENT & PLAN NOTE
Stroke risk factor  Patient on amlodipine at home but holding home BP meds acutely.  SBP < 180  BP currently at goal

## 2020-01-28 NOTE — ASSESSMENT & PLAN NOTE
Pt's pharmacy called and home meds initially continued.   Per psych team 1/27, suspect Zyprexa was not a recent home med but that patient had stopped it.  Depakote previous started for mood stabalizing & HA benefits. Per Psych, could d/c Zyprexa and increase Depakote to simplify psych regimen as well as continue to improve pt's report of HAs.    Pt now appears more comfortable, less anxious. Will continue monitoring.

## 2020-01-28 NOTE — ASSESSMENT & PLAN NOTE
· Continue Valium taper: consider 5 mg BID today 1/28 --> 5 mg once daily on 1/29  · Continue withdrawal precautions (vital signs and CIWA Q4H) and PRN Ativan with withdrawal parameters (CIWA >8, SBP >160, DBP >100, HR >110)  · Continue to assess for willingness for rehab upon discharge. Today he denies wanting to go to rehab

## 2020-01-28 NOTE — ASSESSMENT & PLAN NOTE
History of transaminitis  Elevated liver enzymes on admission but trended down.  Starting low-dose Atorvastatin

## 2020-01-28 NOTE — PLAN OF CARE
Attempted to call Dr. Logan to schedule hospital follow up, received answering machine. LVM for clinic to return my call to schedule appt. Informed this needs to be scheduled prior to discharge and that pt is ready to discharge.

## 2020-01-28 NOTE — NURSING
During med pass pt wife ask for me to contact stroke team because she wanted to know plan of care. Stroke team was called. The pt wife had some concerns with a head scab that I called stroke team about and was looked at that morning. She was also concerned about his SBP and medications the pt is receiving. CAROLYN Steel came to pt bedside and talked to pt wife. Will continue to monitor pt.

## 2020-01-28 NOTE — ASSESSMENT & PLAN NOTE
- Lesion appears irregular/traumatic in nature with secondary surrounding granulomatous inflammation around the periphery  - Would do bacterial culture of pustule/ulceration  - Wound care with: Twice a day mild soap and water with vaseline vs Mupirocin application while awaiting sensitivities.  - Sensation of pressure/inflammation may or may not be contributing to underlying headache patient reports  - If lesion does not begin to improve or worsens please re-consult for re-evaluation.

## 2020-01-28 NOTE — PROGRESS NOTES
[facility]  Vascular Neurology  Comprehensive Stroke Center  Progress Note    Assessment/Plan:     * Embolic stroke involving right middle cerebral artery  44 y.o. man with medical history of HTN, fibromyalgia, alcohol abuse, suicidal ideation, elevated liver enzymes and bipolar disorder who presented to ED intoxicated. CT ordered in ED due to complaints of HA. CT with R parietal infarct. Admitted patient to stroke unit for further workup.     MRI Brain confirmed R posterior MCA infarct, as well as LILINAA globus pallidus (basal ganglia) disease, a pattern often seen with opioid use. TTE with large PFO vs small ASD. Stroke etiology of unclear source but concerns of paradoxical embolus present due to cardiac shunt on echo. Will refer pt to Dr. Damico for closure. Alternative etiologies would relate to drug use; regardless of etiology, cessation of use is strongly encouraged.    See psych sections below. Likely plan to d/c pt home with friends following successful wean of benzo taper.      Antithrombotics for secondary stroke prevention: Antiplatelets: Aspirin: 325 mg daily  Statins for secondary stroke prevention and hyperlipidemia, if present:   Statins: Atorvastatin- 10 mg daily. LFTs improved, starting lower dose stain due to history of alcoholism.  Aggressive risk factor modification: HTN, Smoking, alcohol and drug use  Rehab efforts: The patient has been evaluated by a stroke team provider and the therapy needs have been fully considered based off the presenting complaints and exam findings. The following therapy evaluations are needed: PT evaluate and treat, OT evaluate and treat, SLP evaluate and treat - [facility]  Diagnostics ordered/pending: None   VTE prophylaxis: Heparin 5000 units SQ every 8 hours, place SCDs  BP parameters: Infarct: No intervention, SBP < 180    Right to left cardiac shunt  TTE with R to L shunt, PFO vs ASD  Stroke risk factor; Possible source of stroke  US BLE no  evidence of DVT.  Will need outpatient follow up with Dr. Damico for possible closure.    Opioid use disorder, severe, on maintenance therapy, dependence  Stroke risk factor  Patient reports snorting small amount of drugs night prior to presentation while he believes was heroine. Tox screen positive for barbiturates and opiates  History of psychiatric hospitalization and alcohol/drug rehab in 2018.   with 71 prescriptions, 18 prescribers, and 15 pharmacies.    Addiction Psychiatry consulted, Appreciate assistance.  - Pt on Zubsolv 5.7-1.4 mg TID at home. Discussed with psych resident; ordered 8-2mg BID regimen inpatient for now. If patient is withdrawing on 1/28, could consider increasing regimen to TID dosing.  - Psych discouraging against abrupt discontinuation of benzos due to high risk for further severe withdrawal syndromes. Pt was on Valium 10mg TID yesterday - switched to Valium 5mg TID today. Per psych, likely ok with 5mg BID tomorrow 1/28, etc. (see recs)  - Pt's psychiatrist is Preeti Logan, though [facility] [place] is not sure if pt was following as closely as he needed; may possibly need to provide pt a few day's doses of Suboxone at discharge.   - Need to ensure with CM that pt has close follow-up established with his psychiatrist prior to d/c.   -Plans to contact Dr. Logan for further information    - Pt was counseled on cessation. Previously showed interest in inpatient rehab; CM on-call gave list of options. Though per Psych, pt later less amenable. Need to continue reinforcing.    Essential hypertension  Stroke risk factor  Patient on amlodipine at home but holding home BP meds acutely.  SBP < 180  BP currently at goal    Cytotoxic cerebral edema  Area of cytotoxic cerebral edema identified when reviewing brain imaging in the territory of the R middle cerebral artery. There is mass effect associated with it. We will continue to monitor the patients clinical exam for any worsening of  symptoms which may indicate expansion of the stroke or the area of the edema resulting in the clinical change. The pattern is suggestive of ESUS etiology.    Cephalalgia  Patient given multiple medications by ED staff for headache including Fioricet, dexamethasone, benadryl, remeron, and ketorolac.  D/c'd fioricet. Started Depakote (increased dose 1/27), Tylenol PRN.  Continuing valium taper and suboxone as well.    Scalp wound noted in area of head pain, no drainage, consulted dermatology    Elevated liver enzymes  History of transaminitis  Elevated liver enzymes on admission but trended down.  Starting low-dose Atorvastatin    Tobacco use disorder  Stroke risk factor  Consider nicotine patch if necessary   pt on importance of cessation for secondary stroke prevention prior to discharge.    Suicidal ideations  History of suicidal ideations  No evidence thus far currently but will monitor   Previous provider called pt's pharmacy and re-ordered home psychiatric medications.    Alcohol use disorder, severe, dependence  Pt reported presented to the ED intoxicated.  Monitoring for signs and symptoms of withdrawal per CIWA protocol.  Banana bag administered. Thiamine, MTVN, folic acid in place.    Psychiatry consulted, appreciate recs  Continuing Valium taper per psych recs (too high risk to stop abruptly without gradual wean.)  Counseled pt on cessations. He initially expressed interest in possible inpatint rehab resources.    Generalized anxiety disorder  Pt's pharmacy called and home meds initially continued.   Per psych team 1/27, suspect Zyprexa was not a recent home med but that patient had stopped it.  Depakote previous started for mood stabalizing & HA benefits. Per Psych, could d/c Zyprexa and increase Depakote to simplify psych regimen as well as continue to improve pt's report of HAs.    Pt now appears more comfortable, less anxious. Will continue monitoring.         1/25 started on diazepam for  possible withdrawal, continuing to complain of headache, psychiatry consulted   1/26 continuing valium taper, started suboxone yesterday due to persistent headache,   1/27: Increased Suboxone and continuing Valium taper per addiction psych assistance. D/c'd Zyprexa and increased Depakote. Troponin with downtrend.  1/28: wife at bedside overnight, night team discussed questions/concerns. Head wound on scalp without drainage, ordered ice packs and bactroban, consulted dermatology. Will attempt discharge today but want to ensure suboxone follow up. Patient completed 21 days at a rehab earlier this month but did not complete rehab due to insurance coverage. When he was admitted to rehab facility, he reports that facility discarded his suboxone per their protocol. When he left the facility, he did not have suboxone. Plans to discuss patient further with psychiatry and contact patient's wife    STROKE DOCUMENTATION        NIH Scale:  1a. Level of Consciousness: 0-->Alert, keenly responsive  1b. LOC Questions: 0-->Answers both questions correctly  1c. LOC Commands: 0-->Performs both tasks correctly  2. Best Gaze: 0-->Normal  3. Visual: 1-->Partial hemianopia  4. Facial Palsy: 1-->Minor paralysis (flattened nasolabial fold, asymmetry on smiling)  5a. Motor Arm, Left: 0-->No drift, limb holds 90 (or 45) degrees for full 10 secs  5b. Motor Arm, Right: 0-->No drift, limb holds 90 (or 45) degrees for full 10 secs  6a. Motor Leg, Left: 0-->No drift, leg holds 30 degree position for full 5 secs  6b. Motor Leg, Right: 0-->No drift, leg holds 30 degree position for full 5 secs  7. Limb Ataxia: 0-->Absent  8. Sensory: 1-->Mild-to-moderate sensory loss, patient feels pinprick is less sharp or is dull on the affected side, or there is a loss of superficial pain with pinprick, but patient is aware of being touched  9. Best Language: 0-->No aphasia, normal  10. Dysarthria: 0-->Normal  11. Extinction and Inattention (formerly Neglect):  0-->No abnormality  Total (NIH Stroke Scale): 3       Modified Milan Score: 0  Kecia Coma Scale:    ABCD2 Score:    [id number] Score:   HAS -BLED Score:   ICH Score:   Hunt & Godwin Classification:      Hemorrhagic change of an Ischemic Stroke: Does this patient have an ischemic stroke with hemorrhagic changes? No     Neurologic Chief Complaint: Headache    Subjective:     Interval History: Patient is seen for follow-up neurological assessment and treatment recommendations: NAEON. Continued headache near area of scalp wound.    HPI, Past Medical, Family, and Social History remains the same as documented in the initial encounter.     Review of Systems   Constitutional: Negative for chills and fever.   Eyes: Positive for visual disturbance. Negative for discharge.   Gastrointestinal: Negative for nausea and vomiting.   Skin: Positive for wound (scalp).   Neurological: Positive for facial asymmetry, numbness and headaches. Negative for speech difficulty and weakness.   Psychiatric/Behavioral: Negative for agitation and decreased concentration.     Scheduled Meds:   aspirin  325 mg Oral Daily    atorvastatin  10 mg Oral Daily    buprenorphine-naloxone 8-2 mg  8 mg Sublingual BID    buPROPion  300 mg Oral Daily    diazePAM  5 mg Oral BID    divalproex  500 mg Oral Q12H    DULoxetine  60 mg Oral Daily    folic acid  1 mg Oral Daily    heparin (porcine)  5,000 Units Subcutaneous Q8H    multivitamin  1 tablet Oral Daily    mupirocin   Topical (Top) Daily    pregabalin  300 mg Oral BID    thiamine  100 mg Oral Daily     Continuous Infusions:   sodium chloride 0.9%       PRN Meds:acetaminophen, labetalol, lorazepam, sodium chloride 0.9%, sodium chloride 0.9%    Objective:     Vital Signs (Most Recent):  Temp: 98.3 °F (36.8 °C) (01/28/20 1213)  Pulse: 70 (01/28/20 1213)  Resp: 19 (01/28/20 1213)  BP: (!) 137/90 (01/28/20 1213)  SpO2: 95 % (01/28/20 1213)  BP Location: Right arm    Vital Signs Range (Last  24H):  Temp:  [97.9 °F (36.6 °C)-98.3 °F (36.8 °C)]   Pulse:  [66-77]   Resp:  [18-19]   BP: (125-147)/(78-98)   SpO2:  [92 %-98 %]   BP Location: Right arm    Physical Exam   Constitutional: He is oriented to person, place, and time. He appears well-developed and well-nourished. No distress.   HENT:   Head: Normocephalic and atraumatic.   Eyes: EOM are normal.   Cardiovascular: Normal rate.   Pulmonary/Chest: Effort normal. No respiratory distress.   Musculoskeletal: Normal range of motion.   Neurological: He is alert and oriented to person, place, and time.   Skin: Skin is warm and dry.   Small 2 cm scalp wound with surrounding edema  No drainage noted   Psychiatric: Cognition and memory are not impaired. He is attentive.   Vitals reviewed.          Neurological Exam:   LOC: alert  Attention Span: Good   Language: No aphasia  Articulation: No dysarthria  Orientation: Person, Place, Time   Visual Fields: Full  EOM (CN III, IV, VI): L upper quadrantanopsia  Facial Movement (CN VII): Lower facial weakness on the Left  Motor: Arm left  Normal 5/5  Leg left  Normal 5/5  Arm right  Normal 5/5  Leg right Normal 5/5  Cebellar: No evidence of appendicular or axial ataxia  Sensation: Matthew-hypoesthesia left  Tone: Normal tone throughout    Laboratory:  CMP:   No results for input(s): GLUCOSE, CALCIUM, ALBUMIN, PROT, NA, K, CO2, CL, BUN, CREATININE, ALKPHOS, ALT, AST, BILITOT in the last 24 hours.  CBC:   Recent Labs   Lab 01/26/20  0405   WBC 8.98   RBC 4.00*   HGB 12.3*   HCT 37.5*   *   MCV 94   MCH 30.8   MCHC 32.8     Lipid Panel:   Recent Labs   Lab 01/24/20  1127   CHOL 128   LDLCALC 54.0*   HDL 53   TRIG 105     Coagulation:   Recent Labs   Lab 01/25/20  0313   INR 1.0   APTT 29.3     Platelet Aggregation Study: No results for input(s): PLTAGG, PLTAGINTERP, PLTAGREGLACO, ADPPLTAGGREG in the last 168 hours.  Hgb A1C:   Recent Labs   Lab 01/24/20  1545   HGBA1C 5.2     TSH:   Recent Labs   Lab 01/24/20  1127    TSH 0.625       Diagnostic Results     Brain Imaging     MRI Brain WO Contrast Date: 01/25/20  Large right subtotal MCA vascular territory infarct without mass effect or hemorrhage.    CT Head. Date: 01/25/20  Moderate size region of parenchymal hypoattenuation and sulcal effacement involving the right parietooccipital region suggestive of evolving recent infarct without evidence of hemorrhagic conversion.  Additional, punctate hypodensities are present within the bilateral basal ganglia which appear more conspicuous from prior examination and additional regions of acute ischemia are not excluded.   No evidence of midline shift or hydrocephalus.    CT Head. Date: 01/24/20  Mild moderate hypoattenuation with sulcal effacement right superior occipital/inferior parietal lobes peripherally concerning for recent infarction though indeterminate. Clinical correlation and further evaluation with MRI recommended. There is no evidence for hydrocephalus or acute intracranial hemorrhage.    Vessel Imaging     CTA head and neck. Date: 01/25/20  No evidence of high-grade stenosis or major vascular occlusion.  Evolving subtotal right MCA infarct.  Evolving acute bilateral basal ganglia lacunar infarcts  Age advanced cerebral volume loss.    MRA Neck WO Contrast Date: 01/25/20  Technically very limited study.  Abnormal right carotid bulb, artifact versus stenosis.  Correlate with carotid Doppler.      Cardiac Imaging     TTE with bubble contrast. Date: 01/24/20  · Concentric left ventricular remodeling.  · Normal left ventricular systolic function. The estimated ejection fraction is 55%.  · The right ventricle is at the upper limit of normal in size with normal right ventricular systolic function.  · Normal LV diastolic function.  · Normal central venous pressure (3 mmHg).  · With agitated saline administration, there is evidence of considerable right to left shunting consistent with a large PFO or small ASD.      Allison FELIX  KENNY Gallegos  Comprehensive Stroke Center  Department of Vascular Neurology   [facility]
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
## 2020-01-28 NOTE — PT/OT/SLP PROGRESS
"Physical Therapy Treatment    Patient Name:  Luke Coley   MRN:  [id number]  Admitting Diagnosis: Embolic stroke involving right middle cerebral artery  Recent Surgery: * No surgery found *      Recommendations:     Discharge Recommendations:  home(no needs)   Discharge Equipment Recommendations: none   Barriers to discharge: Decreased caregiver support    Plan:     During this hospitalization, patient to be seen 2 x/week to address the above listed problems via gait training, therapeutic activities, neuromuscular re-education  · Plan of Care Expires:  02/26/20   Plan of Care Reviewed with: patient    This Plan of care has been discussed with the patient who was involved in its development and understands and is in agreement with the identified goals and treatment plan    Subjective     Communicated with RN (Jose) prior to session.     Patient comments: Pt with c/o lightheadedness during gait "Maybe it's because I've been in the bed"  Pain/Comfort:  · Pain Rating 1: 0/10  · Location - Side 1: Right  · Location - Orientation 1: lateral  · Location 1: head  · Pain Addressed 1: Distraction(Stroke team Allison WEBSTER)  · Pain Rating Post-Intervention 1: (no rating provided)    Objective:     2 attempts for tx session:  1. OT working with pt at 11:39 am; 2. Pt eating lunch at 12:30 pm    Patient found with: telemetry    Patient found sup in bed upon PT entry to room, agreeable to treatment.  No family present in the room.    General Precautions: Standard, aspiration, fall   Orthopedic Precautions:N/A   Braces: N/A       BED MOBILITY (vc's for hand placement sequencing of task):        Rolling to the R:  NT.       Rolling to the L:  NT.        Sup > sit at the EOB:  independent.       Sit > sup:  Not performed 2* pt left seated UIC.      TRANSFERS  (vc's for hand placement, sequencing of task and safety)   Patient completed Sit <> Stand Transfer from EOB with independence with no assistive device x1 " trial(s)   Patient completed Stand <> Sit Transfer to BS chair with independenceA with no assistive device     GAIT: in hallway   Patient ambulated: 200ft   Patient required: SBA to sup for safety   Patient used:  No Assistive Device   Gait Pattern observed: reciprocal gait   Gait Deviation(s): decreased kathrine, decreased step length, decreased stride length and mild instability, but no LOB    Comments: vc's for directional guidance and safety    Dynamic Standing Balance       Pt performs tandem walking initially with R HHA then no AD and SBA       Pt performs backwardl stepping with SBA and no AD      EDUCATION  Patient provided with daily orientation and goals of this PT session. They were educated to call for assistance and to transfer with hospital staff only.  Also, pt was educated on the effects of prolonged immobility and the importance of performing OOB activity and exercises to promote healing and reduce recovery time    Whiteboard updated with correct mobility information. RN/PCT notified.  Pt safe to transfer OOB/BTB and amb in hallway with RN/PCT and family: Use no AD with SBA/close sup of 1 person.    Patient left up in chair, with  all lines intact, call button in reach, RN notified and PA (Allison) present    AM-PAC 6 CLICK MOBILITY  Turning over in bed (including adjusting bedclothes, sheets and blankets)?: 4  Sitting down on and standing up from a chair with arms (e.g., wheelchair, bedside commode, etc.): 4  Moving from lying on back to sitting on the side of the bed?: 4  Moving to and from a bed to a chair (including a wheelchair)?: 4  Need to walk in hospital room?: 3  Climbing 3-5 steps with a railing?: 3  Basic Mobility Total Score: 22     Assessment:     Luke Coley is a 44 y.o. male admitted with a medical diagnosis of Embolic stroke involving right middle cerebral artery.  He presents with the following impairments/functional limitations:  gait instability. Pt doing well with bed  mob, transfers and gait requiring no more than SBA.      Rehab Prognosis:  Excellent; patient would benefit from acute skilled PT services to address these deficits and reach maximum level of function.      GOALS:   Multidisciplinary Problems     Physical Therapy Goals        Problem: Physical Therapy Goal    Goal Priority Disciplines Outcome Goal Variances Interventions   Physical Therapy Goal     PT, PT/OT Ongoing, Progressing     Description:  Goals to be met by: 20     Patient will increase functional independence with mobility by performin. Sit to stand transfer with [place]  2. Bed to chair transfer with [place] using no AD.  3. Gait  x 250 feet with Supervision using no AD.                       Time Tracking:     PT Received On: 20  PT Start Time: 1412     PT Stop Time: 1428  PT Total Time (min): 16 min     Billable Minutes: Gait Training 16    Treatment Type: Treatment  PT/PTA: PTA     PTA Visit Number: 1       Sylwia Scott PTA.  Pager [phone]    2020    .

## 2020-01-28 NOTE — ASSESSMENT & PLAN NOTE
44 y.o. man with medical history of HTN, fibromyalgia, alcohol abuse, suicidal ideation, elevated liver enzymes and bipolar disorder who presented to ED intoxicated. CT ordered in ED due to complaints of HA. CT with R parietal infarct. Admitted patient to stroke unit for further workup.     MRI Brain confirmed R posterior MCA infarct, as well as LILIANA globus pallidus (basal ganglia) disease, a pattern often seen with opioid use. TTE with large PFO vs small ASD. Stroke etiology of unclear source but concerns of paradoxical embolus present due to cardiac shunt on echo. Will refer pt to Dr. Damico for closure. Alternative etiologies would relate to drug use; regardless of etiology, cessation of use is strongly encouraged.    See psych sections below. Likely plan to d/c pt home with friends following successful wean of benzo taper.      Antithrombotics for secondary stroke prevention: Antiplatelets: Aspirin: 325 mg daily  Statins for secondary stroke prevention and hyperlipidemia, if present:   Statins: Atorvastatin- 10 mg daily. LFTs improved, starting lower dose stain due to history of alcoholism.  Aggressive risk factor modification: HTN, Smoking, alcohol and drug use  Rehab efforts: The patient has been evaluated by a stroke team provider and the therapy needs have been fully considered based off the presenting complaints and exam findings. The following therapy evaluations are needed: PT evaluate and treat, OT evaluate and treat, SLP evaluate and treat - [facility]  Diagnostics ordered/pending: None   VTE prophylaxis: Heparin 5000 units SQ every 8 hours, place SCDs  BP parameters: Infarct: No intervention, SBP < 180

## 2020-01-28 NOTE — ASSESSMENT & PLAN NOTE
TTE with R to L shunt, PFO vs ASD  Stroke risk factor; Possible source of stroke  US BLE no evidence of DVT.  Will need outpatient follow up with Dr. Damico for possible closure.

## 2020-01-28 NOTE — PLAN OF CARE
Searched Dr. Logan's website. Send appointment request via website with my name and number to assist with scheduling follow up appointment

## 2020-01-28 NOTE — ASSESSMENT & PLAN NOTE
- Pt reports hx of bipolar (II?) disorder, although unclear at this time if this is due to substance use vs true mood disorder.  Currently prescribed Lamictal, Wellbutrin, and Remeron as an outpatient  - Agree with primary team's plan to use Depakote instead of Lamictal or Zyprexa as Depakote can be helpful for both mood stabilization and headache prophylaxis. Continue Depakote 500 mg BID  - Can continue home Wellbutrin  mg daily  - Can continue Cymbalta 50 mg daily. Can be helpful for both mood and chronic pain

## 2020-01-28 NOTE — PROGRESS NOTES
"[facility]  Psychiatry  Progress Note    Patient Name: Luke Coley  MRN: [id number]   Code Status: Full Code  Admission Date: 1/24/2020  Hospital Length of Stay: 4 days  Expected Discharge Date: 1/29/2020  Attending Physician: Troy Mock DO  Primary Care Provider: Primary Doctor No    Current Legal Status: N/A    Patient information was obtained from patient, past medical records and ER records.     Subjective:     Principal Problem:Embolic stroke involving right middle cerebral artery    Chief Complaint: alcohol and opiate use    HPI: Luke Coley is a 44 y.o. male who is seen today for an initial psychiatric evaluation by the addiction psychiatry consult service. Psychiatry was consulted for "alcohol withdrawal".    Per Primary MD:  Luke Coley is a 44 y.o. male medical history of HTN, fibromyalgia, alcohol and drug abuse, suicidal ideation, elevated liver enzymes and bipolar disorder who presented to ED intoxicated.  Patient stated he was out with friends last night drinking alcohol and snorted a small amount of drugs (possibly heroin).  When asked about where he slept for the night, patient states he was looking for his car all night. He presented to the emergency room around 6:00 a.m. this morning.  Currently, the patient is complaining of confusion, dizziness, headache, left facial numbness, and left-sided numbness.  Due to complaints of headache, ED physician ordered CT head which revealed right parietal infarct, stroke team consult. Patient states he drinks once per week but chart review indicates that he has been to rehab for alcohol/drug abuse in 2018.     Per Addiction Psych MD:  Patient seen at bedside. He is calm, cooperative, although complains of headache that worsens with movement. He endorses history of alcohol use disorder with withdrawal complications of DTs/seizures. He states his last drink was 4 days ago, however, due to current condition, " pt is an inconsistent historian. Per Chart Review, pt reported to primary team that his last drink was last night where he also used other substances (possibly heroin). Primary team also notes confusion, dizziness, headache, with impaired recent memory. He is unable to recall specific details regarding presentation to ED, however, denies SI/HI/AVH. Pt expresses interest in rehabilitation upon medical clearance. He has been to rehab in 2018, endorsing 1 year of sobriety before relapsing due to increasing life stressors at that time. He appears tired, in pain, but currently without overt tremors. However, pt does endorse worsening symptoms of withdrawal. Otherwise, pt requested to speak at later time due to his headache.    SUBSTANCE ABUSE HISTORY  Substance(s) of Choice: Alcohol, Opiates  Substances Used: Heroin  History of IVDU?: Unable to assess  Use of Alcohol: severe  Average Consumption: 1/5th whiskey daily  Last Drink: per chart review, the evening prior to admission  Use of Medications for Alcohol/Opioid Use Disorder: yes  History of Complicated Withdrawal: Yes, DTs/seizures  History of Detox: yes  Rehab History: Yes, pt recently finished a 30 day program in [place] 1 month ago. Per chart review, has been to over 50 rehab programs  AA/NA involvement: in the past but none recently (last ~6-12 months ago)  Tobacco: Yes    DSM-5 SUBSTANCE USE DISORDER CRITERIA   Mild (1-3), Moderate (4-5), Severe (?6)  1. Often take in larger amounts or over a longer period of time than was intended.  2. Persistent desire or unsuccessful efforts to cut down or control use.  3. Great deal of time spent in activities necessary to obtain substance, use, or recover from effects.  4. Craving/strong desire for substance or urge to use.  5. Use resulting in failure to fulfill major role obligations at home, work or school.  6. Social, occupational, recreational activities decreased because of use.  7. Continued use despite having  persistent or recurrent social or interpersonal problems cause or exaserbated by the substance.  8. Recurrent use in situations in which it is physically hazardous.  9. Use despite physical or psychological problems that are likely to have been caused or exacerbated by the substance.  10. Tolerance, as defined by either of the following.   A. A need for markedly increased amounts of substance to achieve intoxication or desired effect. -OR-    B. A markedly diminished effect with continued use of the same amount of substance.  11. Withdrawal, as manifested by the following.   A. The characteristic withdrawal syndrome for substance. -AND-   B. Substance is taken to relieve or avoid withdrawal symptoms.  ARE THE CRITERIA MET FOR DSM-5 SUBSTANCE USE DISORDER: severe    PSYCHIATRIC HISTORY  Reported Diagnose(s): bipolar II disorder, alcohol use disorder, opiate use disorder  Previous Medication Trials: yes, multiple including: Buprenorphine (Zubsolv, Suboxone, Bunavail), Wellbutrin, Cymbalta, Remeron, Zyprexa, Restoril, Gabapentin, Lyrica, Geodon, Lamictal, Klonopin, Xanax, Oxazepam, Valium, Ativan, Ambien  Previous Psychiatric Hospitalizations: yes  Outpatient Psychiatrist?: Dr. Preeti Logan    SUICIDE/HOMICIDE RISK ASSESSMENT  Current/active suicidal ideation/plan/intent: denies  Previous suicide attempts: pt denied initially, but per chart review, yes  Current/active homicidal ideation/plan/intent: denies    HISTORY OF TRAUMA, ABUSE & VIOLENCE  Trauma: unable to assess  Physical Abuse: unable to assess  Sexual Abuse: unable to assess  Violent Conduct: denies  Access to Gun: unable to assess     FAMILY PSYCHIATRIC HISTORY   Unable to assess     PSYCHOSOCIAL FACTORS  Stressors (Psychosocial and Environmental): family, health and drug and alcohol.     Hospital Course: 01/26/2020  NAEO. Slightly bradycardic (50's). On Valium 10mg q6hrs. No withdrawal PRNs required in past 24 hours.  Patient seen at bedside. Calm,  "cooperative, alert, and awake, though somewhat of a poor historian due to overall tiredness and impaired recent memory.  Complaining of continued headache that worsens with movement.  Endorsing withdrawal symptoms including waking up diaphoretic and tremulousness. No diaphoresis or overt tremors noted.  Oriented to person, place, month, year, situation.  0 errors on SAVEAHAART.  Denies SI, HI, AVH.  Expresses interest in rehabilitation upon medical clearance.    01/27/2020  Pt reports that his main complaint today is a headache localized to the back of his head. He reports that his memory of recent events that led to this hospitalization is somewhat limited and has difficulty providing clear timeline of events. He reports that he has been maintained on buprenorphine for the past few years. Was getting it from Dr. Bhupinder Dong, but more recently had been seeing other providers while he was out. Review of [place]  shows that he last received Zubsolv 5.7-1.4 mg TID #90 tabs on 12/26/19 written by his psychiatrist Dr. Preeti Logan. Equivalent dose of Suboxone would be 8 mg TID. He reports that he had been doing fairly well to maintain sobriety off opiates with use of buprenorphine. He states that he does not remember using heroin on the night prior to admission (previously had admitted use to other providers). He does endorse heavy drinking lately, approx a fifth of liquor daily. He reports that he was recently released from a 30 day residential rehab program in [place] about 1 month ago. Was not able to stay sober for long after discharge from the program. Discussed going back to another rehab facility. Pt states that he is not sure and he would rather try to get sober using AA and social supports. He is currently undergoing divorce from his wife and is now living with a friend in [place].   Pt reports current withdrawal symptoms of sweating, feeling generally uncomfortable and feeling of "shaking on the " "inside". No visible tremors. He was started on a Valium taper. Got 3 doses of Valium 10 mg yesterday and an additional dose this morning. Has not required any PRN benzos (last given on 1/25 for anxiety, not CIWA triggered). He reports his home psych meds to be Buprenorphine, Lamictal, Wellbutrin, and Remeron. States that he was on Cymbalta in the past but not recently. He reports his diagnosis is Bipolar II disorder and feels overall stable on his psych meds. Biggest concern today is his alcohol/drug use, stroke, and headache.     01/28/2020  Today pt continues to complain of headache which is his primary concern. Discussed home psych meds further. He continues to report some confusion and difficulty thinking clearly, which complicates hx somewhat. He reports that his home psych meds prior to admission were Lamictal, Wellbutrin, and Remeron. He was no longer taking Cymbalta, but cannot say when it was stopped. He states that it was not stopped by his psychiatrist, but rather by another doctor because they felt that he was on too many psych meds. Discussed that Lamictal was stopped and Depakote started instead as this is helpful for mood stabilization and also headache treatment. Pt expressed understanding. Also discussed that he has been receiving Cymbalta here which can be helpful for chronic pain. Discussed pt's plans for discharge once he is medically stable for discharge. He reports that he plans to return home and go to AA meetings. Discussed his plans for buprenorphine follow up. Today he reports that he has no buprenorphine at home. He states that he was been out since leaving rehab. States that when he was in rehab in [place] they made him turn in and destroy all of his buprenorphine. Discussed  results showing that he filled Zubsolv (buprenorphine) on 12/26/19 and he had previously stated that he had been home from rehab for about a month prior to this admission. Pt has difficulty with dates and " timelines due to his confusion and he reports difficulty focusing due to headache. He states that despite some discrepancies in timeline, he does not think that he has any buprenorphine at home. Discussed that he will need to f/u with a doctor outpt to get this medication. He expressed understanding. He was given contact info for Dr. Logan and Dr. Dong and instructed to try to call today to make an appt.   He denied any alcohol or opiate withdrawal symptoms today. He was oriented to month, year, and situation but not day or date. Unable to spell WORLD backwards, but he attributes this to difficulty focusing due to headache and s/p CVA, rather than withdrawal symptoms. He did receive one dose of PRN Valium 5 mg yesterday at 1210. Valium taper ordered for 5 mg BID today. Depakote increased to 500 mg BID and Zyprexa stopped. Buprenorphine increased to 8 mg BID today - received total of 2mg+2mg+8mg yesterday.    Interval History: see hospital course    Family History     Problem Relation (Age of Onset)    Benign prostatic hyperplasia Father    Breast cancer Mother (53)    Down syndrome Daughter    Hashimoto's thyroiditis Sister    Hypertension Father    Irritable bowel syndrome Sister    No Known Problems Sister        Tobacco Use    Smoking status: Current Every Day Smoker     Packs/day: 0.05     Types: Cigarettes    Smokeless tobacco: Never Used    Tobacco comment: Currently uses nicorette gum and lozenges.   Substance and Sexual Activity    Alcohol use: No    Drug use: No    Sexual activity: Yes     Partners: Female     Birth control/protection: None     Psychotherapeutics (From admission, onward)    Start     Stop Route Frequency Ordered    01/28/20 2100  diazePAM tablet 5 mg      -- Oral 2 times daily 01/28/20 0942    01/27/20 1633  lorazepam injection 2 mg      -- IV Every hour PRN 01/27/20 1637    01/25/20 0900  buPROPion 24 hr tablet 300 mg      -- Oral Daily 01/24/20 1855    01/25/20 0900   "DULoxetine DR capsule 60 mg      -- Oral Daily 01/24/20 5930           Review of Systems   Constitutional: Negative for diaphoresis.   Neurological: Positive for headaches. Negative for tremors and seizures.   Psychiatric/Behavioral: Negative for dysphoric mood, hallucinations, sleep disturbance and suicidal ideas. The patient is not nervous/anxious.      Objective:     Vital Signs (Most Recent):  Temp: 98.3 °F (36.8 °C) (01/28/20 0754)  Pulse: 77 (01/28/20 0754)  Resp: 18 (01/28/20 0754)  BP: (!) 139/93 (01/28/20 0754)  SpO2: (!) 92 % (01/28/20 0754) Vital Signs (24h Range):  Temp:  [97.5 °F (36.4 °C)-98.3 °F (36.8 °C)] 98.3 °F (36.8 °C)  Pulse:  [57-77] 77  Resp:  [18] 18  SpO2:  [92 %-98 %] 92 %  BP: (125-147)/(78-98) 139/93     Height: 5' 8" (172.7 cm)  Weight: 81.6 kg (180 lb)  Body mass index is 27.37 kg/m².      Intake/Output Summary (Last 24 hours) at 1/28/2020 0946  Last data filed at 1/27/2020 2100  Gross per 24 hour   Intake --   Output 1 ml   Net -1 ml       Physical Exam   Constitutional: He appears well-developed and well-nourished.   Appears mildly uncomfortable   Neurological:   No tremors in BUE   Psychiatric:   Mental Status Exam:  Appearance: mildly uncomfortable, holding head durign conversation, good hygiene and grooming, dressed in hospital gown, average weight  Behavior/Cooperation: calm, cooperative  Speech: normal rate/tone/volume  Language: english  Mood: "okay"  Affect: constricted  Thought Process: goal-directed  Associations: no ADRIÁN  Thought Content: denies SI/HI/AVH  Sensorium: alert, awake   Orientation: person, place, month, situation. Not oriented to date  Memory: remote intact, recent impaired  Attention/Concentration: somewhat impaired, could not spell WORLD backwards  Fund of Knowledge: intact   Abstraction: did not assess  Insight: fair to limited  Judgement: fair    Nursing note and vitals reviewed.         Significant Labs:   Last 24 Hours:   Recent Lab Results     None    "       Significant Imaging: I have reviewed all pertinent imaging results/findings within the past 24 hours.    Assessment/Plan:     Substance induced mood disorder  - Pt reports hx of bipolar (II?) disorder, although unclear at this time if this is due to substance use vs true mood disorder.  Currently prescribed Lamictal, Wellbutrin, and Remeron as an outpatient  - Agree with primary team's plan to use Depakote instead of Lamictal or Zyprexa as Depakote can be helpful for both mood stabilization and headache prophylaxis. Continue Depakote 500 mg BID  - Can continue home Wellbutrin  mg daily  - Can continue Cymbalta 50 mg daily. Can be helpful for both mood and chronic pain    Opioid use disorder, severe, on maintenance therapy, dependence  Pt with history of polysubstance use (benzos, alcohol, opiates) and initially reported taking MAT as an outpatient with recent refills confirmed per  (last filled Zubsolv 5.7-1.4 mg TID #90 on 12/26/19 from Dr. Preeti Logan). However, today it is unclear if he actually has had access to outpatient buprenorphine recently. Today pt reports that he had been out of buprenorphine for an unclear amount of time prior to admission (see hospital course). He will need to follow up with his outpatient provider to obtain buprenorphine after discharge (either Dr. Logan or Dr. Dong). If pt is not able to get an outpatient appt, he may need to be tapered off of Suboxone.     - Opiate withdrawal PRNs for comfort may include the following:  Bentyl 20 mg q6h PRN stomach cramps  Vistaril 50 mg TID PRN anxiety, insomnia, or itching  Ibuprofen 600 mg q6h PRN pain  Imodium 2 mg QID PRN diarrhea  Robaxin 500 mg QID PRN muscle spasms  Zofran 8 mg q8h PRN nausea  Clonidine 0.1 mg PO q4h PRN opiate withdrawal (hold Clonidine for HR<60, Systolic<90, Diastolic<60)  Afrin 0.05% nasal spray, 2 sprays BID PRN congestion for 3 days    Alcohol use disorder, severe, dependence  · Continue  Valium taper: consider 5 mg BID today 1/28 --> 5 mg once daily on 1/29  · Continue withdrawal precautions (vital signs and CIWA Q4H) and PRN Ativan with withdrawal parameters (CIWA >8, SBP >160, DBP >100, HR >110)  · Continue to assess for willingness for rehab upon discharge. Today he denies wanting to go to rehab          Need for Continued Hospitalization:   No need for inpatient psychiatric hospitalization. Continue medical care as per the primary team.      In cases of emergency, daily coverage provided by Acute/ER Psych MD, NP, or SW, with contact numbers located in [facility] On Call Schedule    Pt seen and case discussed with addiction psychiatry attending: Dr. Chino Guevara MD  [facility] Psychiatry, PGY-4  01/28/2020

## 2020-01-28 NOTE — SUBJECTIVE & OBJECTIVE
Neurologic Chief Complaint: Headache    Subjective:     Interval History: Patient is seen for follow-up neurological assessment and treatment recommendations: NAEON. Continued headache near area of scalp wound.    HPI, Past Medical, Family, and Social History remains the same as documented in the initial encounter.     Review of Systems   Constitutional: Negative for chills and fever.   Eyes: Positive for visual disturbance. Negative for discharge.   Gastrointestinal: Negative for nausea and vomiting.   Skin: Positive for wound (scalp).   Neurological: Positive for facial asymmetry, numbness and headaches. Negative for speech difficulty and weakness.   Psychiatric/Behavioral: Negative for agitation and decreased concentration.     Scheduled Meds:   aspirin  325 mg Oral Daily    atorvastatin  10 mg Oral Daily    buprenorphine-naloxone 8-2 mg  8 mg Sublingual BID    buPROPion  300 mg Oral Daily    diazePAM  5 mg Oral BID    divalproex  500 mg Oral Q12H    DULoxetine  60 mg Oral Daily    folic acid  1 mg Oral Daily    heparin (porcine)  5,000 Units Subcutaneous Q8H    multivitamin  1 tablet Oral Daily    mupirocin   Topical (Top) Daily    pregabalin  300 mg Oral BID    thiamine  100 mg Oral Daily     Continuous Infusions:   sodium chloride 0.9%       PRN Meds:acetaminophen, labetalol, lorazepam, sodium chloride 0.9%, sodium chloride 0.9%    Objective:     Vital Signs (Most Recent):  Temp: 98.3 °F (36.8 °C) (01/28/20 1213)  Pulse: 70 (01/28/20 1213)  Resp: 19 (01/28/20 1213)  BP: (!) 137/90 (01/28/20 1213)  SpO2: 95 % (01/28/20 1213)  BP Location: Right arm    Vital Signs Range (Last 24H):  Temp:  [97.9 °F (36.6 °C)-98.3 °F (36.8 °C)]   Pulse:  [66-77]   Resp:  [18-19]   BP: (125-147)/(78-98)   SpO2:  [92 %-98 %]   BP Location: Right arm    Physical Exam   Constitutional: He is oriented to person, place, and time. He appears well-developed and well-nourished. No distress.   HENT:   Head: Normocephalic and  atraumatic.   Eyes: EOM are normal.   Cardiovascular: Normal rate.   Pulmonary/Chest: Effort normal. No respiratory distress.   Musculoskeletal: Normal range of motion.   Neurological: He is alert and oriented to person, place, and time.   Skin: Skin is warm and dry.   Small 2 cm scalp wound with surrounding edema  No drainage noted   Psychiatric: Cognition and memory are not impaired. He is attentive.   Vitals reviewed.          Neurological Exam:   LOC: alert  Attention Span: Good   Language: No aphasia  Articulation: No dysarthria  Orientation: Person, Place, Time   Visual Fields: Full  EOM (CN III, IV, VI): L upper quadrantanopsia  Facial Movement (CN VII): Lower facial weakness on the Left  Motor: Arm left  Normal 5/5  Leg left  Normal 5/5  Arm right  Normal 5/5  Leg right Normal 5/5  Cebellar: No evidence of appendicular or axial ataxia  Sensation: Matthew-hypoesthesia left  Tone: Normal tone throughout    Laboratory:  CMP:   No results for input(s): GLUCOSE, CALCIUM, ALBUMIN, PROT, NA, K, CO2, CL, BUN, CREATININE, ALKPHOS, ALT, AST, BILITOT in the last 24 hours.  CBC:   Recent Labs   Lab 01/26/20  0405   WBC 8.98   RBC 4.00*   HGB 12.3*   HCT 37.5*   *   MCV 94   MCH 30.8   MCHC 32.8     Lipid Panel:   Recent Labs   Lab 01/24/20  1127   CHOL 128   LDLCALC 54.0*   HDL 53   TRIG 105     Coagulation:   Recent Labs   Lab 01/25/20  0313   INR 1.0   APTT 29.3     Platelet Aggregation Study: No results for input(s): PLTAGG, PLTAGINTERP, PLTAGREGLACO, ADPPLTAGGREG in the last 168 hours.  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    CT Head. Date: 01/25/20  Moderate size region of parenchymal hypoattenuation and sulcal effacement involving the right parietooccipital region  suggestive of evolving recent infarct without evidence of hemorrhagic conversion.  Additional, punctate hypodensities are present within the bilateral basal ganglia which appear more conspicuous from prior examination and additional regions of acute ischemia are not excluded.   No evidence of midline shift or hydrocephalus.    CT Head. Date: 01/24/20  Mild moderate hypoattenuation with sulcal effacement right superior occipital/inferior parietal lobes peripherally concerning for recent infarction though indeterminate. Clinical correlation and further evaluation with MRI recommended. There is no evidence for hydrocephalus or acute intracranial hemorrhage.    Vessel Imaging     CTA head and neck. Date: 01/25/20  No evidence of high-grade stenosis or major vascular occlusion.  Evolving subtotal right MCA infarct.  Evolving acute bilateral basal ganglia lacunar infarcts  Age advanced cerebral volume loss.    MRA Neck WO Contrast Date: 01/25/20  Technically very limited study.  Abnormal right carotid bulb, artifact versus stenosis.  Correlate with carotid Doppler.      Cardiac Imaging     TTE with bubble contrast. Date: 01/24/20  · Concentric left ventricular remodeling.  · Normal left ventricular systolic function. The estimated ejection fraction is 55%.  · The right ventricle is at the upper limit of normal in size with normal right ventricular systolic function.  · Normal LV diastolic function.  · Normal central venous pressure (3 mmHg).  · With agitated saline administration, there is evidence of considerable right to left shunting consistent with a large PFO or small ASD.

## 2020-01-28 NOTE — PLAN OF CARE
Problem: Occupational Therapy Goal  Goal: Occupational Therapy Goal  Description  Goals set on 1/25, with expiration date 2/8:  Patient will increase functional independence with ADLs by performing:    Grooming while standing at sink with Contact Guard Assistance.  UB Dressing with Stand-by Assistance.  LB Dressing with Stand-by Assistance.  Toileting from toilet with Contact Guard Assistance for hygiene and clothing management.   Step transfer with Contact Guard Assistance  Toilet transfer to toilet with Contact Guard Assistance.   Outcome: Met       Patient has met all goals and no longer requires skilled OT services. Patient is safe to discharge home when medically stable.   Fabiola Garcia, LOTR  01/28/2020

## 2020-01-28 NOTE — ASSESSMENT & PLAN NOTE
Patient given multiple medications by ED staff for headache including Fioricet, dexamethasone, benadryl, remeron, and ketorolac.  D/c'd fioricet. Started Depakote (increased dose 1/27), Tylenol PRN.  Continuing valium taper and suboxone as well.    Scalp wound noted in area of head pain, no drainage, consulted dermatology

## 2020-01-28 NOTE — SIGNIFICANT EVENT
Received call from nurse that family at bedside and has major concerns  Upon entering room female identified as patient wife who is  from her  at present so not living with him her number is [phone] she would like to be contacted by day team  Her concerns are as follows:  1) wound on scalp she is concerned for MRSA as everyone in the family has had it, I had looked at this wound this am and did not notice any drainage but patient had been picking at it, I passed on to day shift to evaluate, I will place derm consult now for am  2) patient is on Valium and not a taper and he should not be on this drug, informed this is recommendation from psychiatry for valium taper and suboxone, she states he should not be on wellbutrin as he was just detoxed from this, informed I would pass on her phone number and ask day team to have psyc call her  3) concerned about BP being too high SBP 98 is too high informed we allow permissive hypertension and parameters are in place

## 2020-01-28 NOTE — ASSESSMENT & PLAN NOTE
Patient given multiple medications by ED staff for headache including Fioricet, dexamethasone, benadryl, remeron, and ketorolac.  D/c'd fioricet. Started Depakote (increased dose 1/27), Tylenol PRN.  Continuing valium taper and suboxone as well.  Pt given 1 dose IV toradol 1/26 with noted improvement.

## 2020-01-28 NOTE — ASSESSMENT & PLAN NOTE
Stroke risk factor  Consider nicotine patch if necessary   pt on importance of cessation for secondary stroke prevention prior to discharge.

## 2020-01-28 NOTE — PROGRESS NOTES
[facility]  Vascular Neurology  Comprehensive Stroke Center  Progress Note    Assessment/Plan:     * Embolic stroke involving right middle cerebral artery  44 y.o. man with medical history of HTN, fibromyalgia, alcohol abuse, suicidal ideation, elevated liver enzymes and bipolar disorder who presented to ED intoxicated. CT ordered in ED due to complaints of HA. CT with R parietal infarct. Admitted patient to stroke unit for further workup.     MRI Brain confirmed R posterior MCA infarct, as well as LILIANA globus pallidus (basal ganglia) disease, a pattern often seen with opioid use. TTE with large PFO vs small ASD. Stroke etiology of unclear source but concerns of paradoxical embolus present due to cardiac shunt on echo. Will refer pt to Dr. Damico for closure. Alternative etiologies would relate to drug use; regardless of etiology, cessation of use is strongly encouraged.    See psych sections below. Likely plan to d/c pt home with friends following successful wean of benzo taper.      Antithrombotics for secondary stroke prevention: Antiplatelets: Aspirin: 325 mg daily  Statins for secondary stroke prevention and hyperlipidemia, if present:   Statins: Atorvastatin- 10 mg daily. LFTs improved, starting lower dose stain due to history of alcoholism.  Aggressive risk factor modification: HTN, Smoking, alcohol and drug use  Rehab efforts: The patient has been evaluated by a stroke team provider and the therapy needs have been fully considered based off the presenting complaints and exam findings. The following therapy evaluations are needed: PT evaluate and treat, OT evaluate and treat, SLP evaluate and treat - [facility]  Diagnostics ordered/pending: None   VTE prophylaxis: Heparin 5000 units SQ every 8 hours, place SCDs  BP parameters: Infarct: No intervention, SBP < 180    Opioid use disorder, severe, on maintenance therapy, dependence  Stroke risk factor  Patient reports snorting small  amount of drugs night prior to presentation while he believes was heroine. Tox screen positive for barbiturates and opiates  History of psychiatric hospitalization and alcohol/drug rehab in 2018.   with 71 prescriptions, 18 prescribers, and 15 pharmacies.    Addiction Psychiatry consulted, Appreciate assistance.  - Pt on Zubsolv 5.7-1.4 mg TID at home. Discussed with psych resident; ordered 8-2mg BID regimen inpatient for now. If patient is withdrawing on 1/28, could consider increasing regimen to TID dosing.  - Psych discouraging against abrupt discontinuation of benzos due to high risk for further severe withdrawal syndromes. Pt was on Valium 10mg TID yesterday - switched to Valium 5mg TID today. Per psych, likely ok with 5mg BID tomorrow 1/28, etc. (see recs)  - Pt's psychiatrist is Preeti Logan, though [facility] psych team is not sure if pt was following as closely as he needed; may possibly need to provide pt a few day's doses of Suboxone at discharge.   - Need to ensure with CM that pt has close follow-up established with his psychiatrist prior to d/c.    - Pt was counseled on cessation. Previously showed interest in inpatient rehab; CM on-call gave list of options. Though per Psych, pt later less amenable. Need to continue reinforcing.    Right to left cardiac shunt  TTE with R to L shunt, PFO vs ASD  Stroke risk factor; Possible source of stroke  US BLE no evidence of DVT.  Will need outpatient follow up with Dr. Damico for possible closure.    Alcohol use disorder, severe, dependence  Pt reported presented to the ED intoxicated.  Monitoring for signs and symptoms of withdrawal per CIWA protocol.  Banana bag administered. Thiamine, MTVN, folic acid in place.    Psychiatry consulted, appreciate recs  Continuing Valium taper per psych recs (too high risk to stop abruptly without gradual wean.)  Counseled pt on cessations. He initially expressed interest in possible inpatint rehab resources.    Generalized  anxiety disorder  Pt's pharmacy called and home meds initially continued.   Per psych team 1/27, suspect Zyprexa was not a recent home med but that patient had stopped it.  Depakote previous started for mood stabalizing & HA benefits. Per Psych, could d/c Zyprexa and increase Depakote to simplify psych regimen as well as continue to improve pt's report of HAs.    Pt now appears more comfortable, less anxious. Will continue monitoring.    Essential hypertension  Stroke risk factor  Patient on amlodipine at home but holding home BP meds acutely.  SBP < 180  BP currently at goal    Cytotoxic cerebral edema  Area of cytotoxic cerebral edema identified when reviewing brain imaging in the territory of the R middle cerebral artery. There is mass effect associated with it. We will continue to monitor the patients clinical exam for any worsening of symptoms which may indicate expansion of the stroke or the area of the edema resulting in the clinical change. The pattern is suggestive of ESUS etiology.    Cephalalgia  Patient given multiple medications by ED staff for headache including Fioricet, dexamethasone, benadryl, remeron, and ketorolac.  D/c'd fioricet. Started Depakote (increased dose 1/27), Tylenol PRN.  Continuing valium taper and suboxone as well.  Pt given 1 dose IV toradol 1/26 with noted improvement.    Tobacco use disorder  Stroke risk factor  Consider nicotine patch if necessary   pt on importance of cessation for secondary stroke prevention prior to discharge.    Elevated liver enzymes  History of transaminitis  Elevated liver enzymes on admission but trended down.  Starting low-dose Atorvastatin    Suicidal ideations  History of suicidal ideations  No evidence thus far currently but will monitor   Previous provider called pt's pharmacy and re-ordered home psychiatric medications.       1/25 started on diazepam for possible withdrawal, continuing to complain of headache, psychiatry consulted   1/26  continuing valium taper, started suboxone yesterday due to persistent headache,   1/27: Increased Suboxone and continuing Valium taper per addiction psych assistance. D/c'd Zyprexa and increased Depakote. Troponin with downtrend.    STROKE DOCUMENTATION        NIH Scale:  1a. Level of Consciousness: 0-->Alert, keenly responsive  1b. LOC Questions: 0-->Answers both questions correctly  1c. LOC Commands: 0-->Performs both tasks correctly  2. Best Gaze: 0-->Normal  3. Visual: 1-->Partial hemianopia  4. Facial Palsy: 1-->Minor paralysis (flattened nasolabial fold, asymmetry on smiling)  5a. Motor Arm, Left: 0-->No drift, limb holds 90 (or 45) degrees for full 10 secs  5b. Motor Arm, Right: 0-->No drift, limb holds 90 (or 45) degrees for full 10 secs  6a. Motor Leg, Left: 0-->No drift, leg holds 30 degree position for full 5 secs  6b. Motor Leg, Right: 0-->No drift, leg holds 30 degree position for full 5 secs  7. Limb Ataxia: 0-->Absent  8. Sensory: 1-->Mild-to-moderate sensory loss, patient feels pinprick is less sharp or is dull on the affected side, or there is a loss of superficial pain with pinprick, but patient is aware of being touched  9. Best Language: 0-->No aphasia, normal  10. Dysarthria: 0-->Normal  11. Extinction and Inattention (formerly Neglect): 0-->No abnormality  Total (NIH Stroke Scale): 3       Modified Boise Score: 0  Estill Coma Scale:    ABCD2 Score:    [id number] Score:   HAS -BLED Score:   ICH Score:   Hunt & Godwin Classification:      Hemorrhagic change of an Ischemic Stroke: Does this patient have an ischemic stroke with hemorrhagic changes? No     Neurologic Chief Complaint: Headache    Subjective:     Interval History: Patient is seen for follow-up neurological assessment and treatment recommendations: NAEON. Increased Suboxone and continuing Valium taper per addiction psych assistance. D/c'd Zyprexa and increased Depakote. Troponin with downtrend.    HPI, Past Medical, Family, and  Social History remains the same as documented in the initial encounter.     Review of Systems   Constitutional: Negative for fatigue and fever.   Eyes: Positive for visual disturbance. Negative for discharge.   Gastrointestinal: Negative for nausea and vomiting.   Neurological: Positive for facial asymmetry, numbness and headaches. Negative for speech difficulty and weakness.   Psychiatric/Behavioral: Negative for agitation and decreased concentration.     Scheduled Meds:   aspirin  325 mg Oral Daily    atorvastatin  10 mg Oral Daily    buprenorphine-naloxone 8-2 mg  8 mg Sublingual BID    buPROPion  300 mg Oral Daily    diazePAM  5 mg Oral TID    divalproex  500 mg Oral Q12H    DULoxetine  60 mg Oral Daily    folic acid  1 mg Oral Daily    heparin (porcine)  5,000 Units Subcutaneous Q8H    multivitamin  1 tablet Oral Daily    pregabalin  300 mg Oral BID    thiamine  100 mg Oral Daily     Continuous Infusions:   sodium chloride 0.9%       PRN Meds:acetaminophen, labetalol, lorazepam, meloxicam, sodium chloride 0.9%, sodium chloride 0.9%    Objective:     Vital Signs (Most Recent):  Temp: 97.9 °F (36.6 °C) (01/27/20 1945)  Pulse: 66 (01/27/20 2000)  Resp: 18 (01/27/20 1945)  BP: (!) 141/98 (01/27/20 1945)  SpO2: (!) 93 % (01/27/20 1945)  BP Location: Right arm    Vital Signs Range (Last 24H):  Temp:  [97.5 °F (36.4 °C)-98.2 °F (36.8 °C)]   Pulse:  [57-71]   Resp:  [16-18]   BP: (119-142)/(70-98)   SpO2:  [90 %-98 %]   BP Location: Right arm    Physical Exam   Constitutional: He is oriented to person, place, and time. He appears well-developed and well-nourished. No distress.   HENT:   Head: Normocephalic and atraumatic.   Eyes: Conjunctivae and EOM are normal.   Cardiovascular: Normal rate.   Pulmonary/Chest: Effort normal. No respiratory distress.   Musculoskeletal: Normal range of motion. He exhibits no edema or deformity.   Neurological: He is alert and oriented to person, place, and time.   Skin:  Skin is warm and dry.   Psychiatric: Cognition and memory are not impaired. He is attentive.   Vitals reviewed.      Neurological Exam:   LOC: alert  Attention Span: Good   Language: No aphasia  Articulation: No dysarthria  Orientation: Person, Place, Time   Visual Fields: Full  EOM (CN III, IV, VI): L upper quadrantanopsia  Facial Movement (CN VII): Lower facial weakness on the Left  Motor: Arm left  Normal 5/5  Leg left  Normal 5/5  Arm right  Normal 5/5  Leg right Normal 5/5  Cebellar: No evidence of appendicular or axial ataxia  Sensation: Matthew-hypoesthesia left  Tone: Normal tone throughout    Laboratory:  CMP:   No results for input(s): GLUCOSE, CALCIUM, ALBUMIN, PROT, NA, K, CO2, CL, BUN, CREATININE, ALKPHOS, ALT, AST, BILITOT in the last 24 hours.  CBC:   Recent Labs   Lab 01/26/20  0405   WBC 8.98   RBC 4.00*   HGB 12.3*   HCT 37.5*   *   MCV 94   MCH 30.8   MCHC 32.8     Lipid Panel:   Recent Labs   Lab 01/24/20  1127   CHOL 128   LDLCALC 54.0*   HDL 53   TRIG 105     Coagulation:   Recent Labs   Lab 01/25/20  0313   INR 1.0   APTT 29.3     Platelet Aggregation Study: No results for input(s): PLTAGG, PLTAGINTERP, PLTAGREGLACO, ADPPLTAGGREG in the last 168 hours.  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    CT Head. Date: 01/25/20  Moderate size region of parenchymal hypoattenuation and sulcal effacement involving the right parietooccipital region suggestive of evolving recent infarct without evidence of hemorrhagic conversion.  Additional, punctate hypodensities are present within the bilateral basal ganglia which appear more conspicuous from prior examination and additional regions of acute ischemia are not excluded.   No evidence of midline shift or  hydrocephalus.    CT Head. Date: 01/24/20  Mild moderate hypoattenuation with sulcal effacement right superior occipital/inferior parietal lobes peripherally concerning for recent infarction though indeterminate. Clinical correlation and further evaluation with MRI recommended. There is no evidence for hydrocephalus or acute intracranial hemorrhage.    Vessel Imaging     CTA head and neck. Date: 01/25/20  No evidence of high-grade stenosis or major vascular occlusion.  Evolving subtotal right MCA infarct.  Evolving acute bilateral basal ganglia lacunar infarcts  Age advanced cerebral volume loss.    MRA Neck WO Contrast Date: 01/25/20  Technically very limited study.  Abnormal right carotid bulb, artifact versus stenosis.  Correlate with carotid Doppler.      Cardiac Imaging     TTE with bubble contrast. Date: 01/24/20  · Concentric left ventricular remodeling.  · Normal left ventricular systolic function. The estimated ejection fraction is 55%.  · The right ventricle is at the upper limit of normal in size with normal right ventricular systolic function.  · Normal LV diastolic function.  · Normal central venous pressure (3 mmHg).  · With agitated saline administration, there is evidence of considerable right to left shunting consistent with a large PFO or small ASD.      Sandra Tristan PA-C  Comprehensive Stroke Center  Department of Vascular Neurology   [facility]

## 2020-01-28 NOTE — SUBJECTIVE & OBJECTIVE
Neurologic Chief Complaint: Headache    Subjective:     Interval History: Patient is seen for follow-up neurological assessment and treatment recommendations: NAEON. Increased Suboxone and continuing Valium taper per addiction psych assistance. D/c'd Zyprexa and increased Depakote. Troponin with downtrend.    HPI, Past Medical, Family, and Social History remains the same as documented in the initial encounter.     Review of Systems   Constitutional: Negative for fatigue and fever.   Eyes: Positive for visual disturbance. Negative for discharge.   Gastrointestinal: Negative for nausea and vomiting.   Neurological: Positive for facial asymmetry, numbness and headaches. Negative for speech difficulty and weakness.   Psychiatric/Behavioral: Negative for agitation and decreased concentration.     Scheduled Meds:   aspirin  325 mg Oral Daily    atorvastatin  10 mg Oral Daily    buprenorphine-naloxone 8-2 mg  8 mg Sublingual BID    buPROPion  300 mg Oral Daily    diazePAM  5 mg Oral TID    divalproex  500 mg Oral Q12H    DULoxetine  60 mg Oral Daily    folic acid  1 mg Oral Daily    heparin (porcine)  5,000 Units Subcutaneous Q8H    multivitamin  1 tablet Oral Daily    pregabalin  300 mg Oral BID    thiamine  100 mg Oral Daily     Continuous Infusions:   sodium chloride 0.9%       PRN Meds:acetaminophen, labetalol, lorazepam, meloxicam, sodium chloride 0.9%, sodium chloride 0.9%    Objective:     Vital Signs (Most Recent):  Temp: 97.9 °F (36.6 °C) (01/27/20 1945)  Pulse: 66 (01/27/20 2000)  Resp: 18 (01/27/20 1945)  BP: (!) 141/98 (01/27/20 1945)  SpO2: (!) 93 % (01/27/20 1945)  BP Location: Right arm    Vital Signs Range (Last 24H):  Temp:  [97.5 °F (36.4 °C)-98.2 °F (36.8 °C)]   Pulse:  [57-71]   Resp:  [16-18]   BP: (119-142)/(70-98)   SpO2:  [90 %-98 %]   BP Location: Right arm    Physical Exam   Constitutional: He is oriented to person, place, and time. He appears well-developed and well-nourished. No  distress.   HENT:   Head: Normocephalic and atraumatic.   Eyes: Conjunctivae and EOM are normal.   Cardiovascular: Normal rate.   Pulmonary/Chest: Effort normal. No respiratory distress.   Musculoskeletal: Normal range of motion. He exhibits no edema or deformity.   Neurological: He is alert and oriented to person, place, and time.   Skin: Skin is warm and dry.   Psychiatric: Cognition and memory are not impaired. He is attentive.   Vitals reviewed.      Neurological Exam:   LOC: alert  Attention Span: Good   Language: No aphasia  Articulation: No dysarthria  Orientation: Person, Place, Time   Visual Fields: Full  EOM (CN III, IV, VI): L upper quadrantanopsia  Facial Movement (CN VII): Lower facial weakness on the Left  Motor: Arm left  Normal 5/5  Leg left  Normal 5/5  Arm right  Normal 5/5  Leg right Normal 5/5  Cebellar: No evidence of appendicular or axial ataxia  Sensation: Matthew-hypoesthesia left  Tone: Normal tone throughout    Laboratory:  CMP:   No results for input(s): GLUCOSE, CALCIUM, ALBUMIN, PROT, NA, K, CO2, CL, BUN, CREATININE, ALKPHOS, ALT, AST, BILITOT in the last 24 hours.  CBC:   Recent Labs   Lab 01/26/20  0405   WBC 8.98   RBC 4.00*   HGB 12.3*   HCT 37.5*   *   MCV 94   MCH 30.8   MCHC 32.8     Lipid Panel:   Recent Labs   Lab 01/24/20  1127   CHOL 128   LDLCALC 54.0*   HDL 53   TRIG 105     Coagulation:   Recent Labs   Lab 01/25/20  0313   INR 1.0   APTT 29.3     Platelet Aggregation Study: No results for input(s): PLTAGG, PLTAGINTERP, PLTAGREGLACO, ADPPLTAGGREG in the last 168 hours.  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    CT Head. Date: 01/25/20  Moderate size region of parenchymal hypoattenuation and sulcal effacement involving the right  parietooccipital region suggestive of evolving recent infarct without evidence of hemorrhagic conversion.  Additional, punctate hypodensities are present within the bilateral basal ganglia which appear more conspicuous from prior examination and additional regions of acute ischemia are not excluded.   No evidence of midline shift or hydrocephalus.    CT Head. Date: 01/24/20  Mild moderate hypoattenuation with sulcal effacement right superior occipital/inferior parietal lobes peripherally concerning for recent infarction though indeterminate. Clinical correlation and further evaluation with MRI recommended. There is no evidence for hydrocephalus or acute intracranial hemorrhage.    Vessel Imaging     CTA head and neck. Date: 01/25/20  No evidence of high-grade stenosis or major vascular occlusion.  Evolving subtotal right MCA infarct.  Evolving acute bilateral basal ganglia lacunar infarcts  Age advanced cerebral volume loss.    MRA Neck WO Contrast Date: 01/25/20  Technically very limited study.  Abnormal right carotid bulb, artifact versus stenosis.  Correlate with carotid Doppler.      Cardiac Imaging     TTE with bubble contrast. Date: 01/24/20  · Concentric left ventricular remodeling.  · Normal left ventricular systolic function. The estimated ejection fraction is 55%.  · The right ventricle is at the upper limit of normal in size with normal right ventricular systolic function.  · Normal LV diastolic function.  · Normal central venous pressure (3 mmHg).  · With agitated saline administration, there is evidence of considerable right to left shunting consistent with a large PFO or small ASD.

## 2020-01-29 PROCEDURE — 25000003 PHARM REV CODE 250: Performed by: PHYSICIAN ASSISTANT

## 2020-01-29 PROCEDURE — 92507 TX SP LANG VOICE COMM INDIV: CPT

## 2020-01-29 PROCEDURE — 11000001 HC ACUTE MED/SURG PRIVATE ROOM

## 2020-01-29 PROCEDURE — 63600175 PHARM REV CODE 636 W HCPCS: Performed by: PHYSICIAN ASSISTANT

## 2020-01-29 PROCEDURE — 25000003 PHARM REV CODE 250: Performed by: NURSE PRACTITIONER

## 2020-01-29 PROCEDURE — 99233 PR SUBSEQUENT HOSPITAL CARE,LEVL III: ICD-10-PCS | Mod: ,,, | Performed by: PSYCHIATRY & NEUROLOGY

## 2020-01-29 PROCEDURE — 97116 GAIT TRAINING THERAPY: CPT

## 2020-01-29 PROCEDURE — 99233 SBSQ HOSP IP/OBS HIGH 50: CPT | Mod: ,,, | Performed by: PSYCHIATRY & NEUROLOGY

## 2020-01-29 PROCEDURE — 99232 SBSQ HOSP IP/OBS MODERATE 35: CPT | Mod: ,,, | Performed by: PSYCHIATRY & NEUROLOGY

## 2020-01-29 PROCEDURE — 97535 SELF CARE MNGMENT TRAINING: CPT

## 2020-01-29 PROCEDURE — 63600175 PHARM REV CODE 636 W HCPCS: Performed by: NURSE PRACTITIONER

## 2020-01-29 PROCEDURE — 99232 PR SUBSEQUENT HOSPITAL CARE,LEVL II: ICD-10-PCS | Mod: ,,, | Performed by: PSYCHIATRY & NEUROLOGY

## 2020-01-29 RX ORDER — IBUPROFEN 400 MG/1
400 TABLET ORAL EVERY 6 HOURS PRN
Status: DISCONTINUED | OUTPATIENT
Start: 2020-01-29 | End: 2020-01-30

## 2020-01-29 RX ORDER — CLONIDINE 0.1 MG/24H
1 PATCH, EXTENDED RELEASE TRANSDERMAL
Status: DISCONTINUED | OUTPATIENT
Start: 2020-01-29 | End: 2020-01-31 | Stop reason: HOSPADM

## 2020-01-29 RX ORDER — BUPRENORPHINE HYDROCHLORIDE AND NALOXONE HYDROCHLORIDE DIHYDRATE 2; .5 MG/1; MG/1
4 TABLET SUBLINGUAL 2 TIMES DAILY
Status: DISCONTINUED | OUTPATIENT
Start: 2020-01-29 | End: 2020-01-30

## 2020-01-29 RX ORDER — BUPRENORPHINE HYDROCHLORIDE AND NALOXONE HYDROCHLORIDE DIHYDRATE 2; .5 MG/1; MG/1
2 TABLET SUBLINGUAL NIGHTLY
Status: DISCONTINUED | OUTPATIENT
Start: 2020-01-30 | End: 2020-01-30

## 2020-01-29 RX ADMIN — PREGABALIN 300 MG: 150 CAPSULE ORAL at 09:01

## 2020-01-29 RX ADMIN — PREGABALIN 300 MG: 150 CAPSULE ORAL at 08:01

## 2020-01-29 RX ADMIN — BUPRENORPHINE HYDROCHLORIDE AND NALOXONE HYDROCHLORIDE DIHYDRATE 1 TABLET: 2; .5 TABLET SUBLINGUAL at 09:01

## 2020-01-29 RX ADMIN — ACETAMINOPHEN 500 MG: 500 TABLET ORAL at 05:01

## 2020-01-29 RX ADMIN — CLONIDINE 1 PATCH: 0.1 PATCH TRANSDERMAL at 04:01

## 2020-01-29 RX ADMIN — HEPARIN SODIUM 5000 UNITS: 5000 INJECTION INTRAVENOUS; SUBCUTANEOUS at 08:01

## 2020-01-29 RX ADMIN — ATORVASTATIN CALCIUM 10 MG: 10 TABLET, FILM COATED ORAL at 09:01

## 2020-01-29 RX ADMIN — THERA TABS 1 TABLET: TAB at 09:01

## 2020-01-29 RX ADMIN — MUPIROCIN: 20 OINTMENT TOPICAL at 09:01

## 2020-01-29 RX ADMIN — IBUPROFEN 400 MG: 400 TABLET, FILM COATED ORAL at 06:01

## 2020-01-29 RX ADMIN — MUPIROCIN: 20 OINTMENT TOPICAL at 08:01

## 2020-01-29 RX ADMIN — FOLIC ACID 1 MG: 1 TABLET ORAL at 09:01

## 2020-01-29 RX ADMIN — BUPRENORPHINE HYDROCHLORIDE AND NALOXONE HYDROCHLORIDE DIHYDRATE 1 TABLET: 2; .5 TABLET SUBLINGUAL at 08:01

## 2020-01-29 RX ADMIN — BUPROPION HYDROCHLORIDE 300 MG: 300 TABLET, FILM COATED, EXTENDED RELEASE ORAL at 09:01

## 2020-01-29 RX ADMIN — Medication 100 MG: at 09:01

## 2020-01-29 RX ADMIN — TRAZODONE HYDROCHLORIDE 25 MG: 50 TABLET ORAL at 08:01

## 2020-01-29 RX ADMIN — DULOXETINE HYDROCHLORIDE 60 MG: 30 CAPSULE, DELAYED RELEASE ORAL at 09:01

## 2020-01-29 RX ADMIN — DIAZEPAM 5 MG: 5 TABLET ORAL at 09:01

## 2020-01-29 RX ADMIN — ASPIRIN 325 MG ORAL TABLET 325 MG: 325 PILL ORAL at 09:01

## 2020-01-29 RX ADMIN — DIVALPROEX SODIUM 500 MG: 250 TABLET, DELAYED RELEASE ORAL at 09:01

## 2020-01-29 RX ADMIN — BACLOFEN 5 MG: 10 TABLET ORAL at 06:01

## 2020-01-29 RX ADMIN — HEPARIN SODIUM 5000 UNITS: 5000 INJECTION INTRAVENOUS; SUBCUTANEOUS at 05:01

## 2020-01-29 RX ADMIN — DIVALPROEX SODIUM 500 MG: 250 TABLET, DELAYED RELEASE ORAL at 08:01

## 2020-01-29 RX ADMIN — HEPARIN SODIUM 5000 UNITS: 5000 INJECTION INTRAVENOUS; SUBCUTANEOUS at 03:01

## 2020-01-29 NOTE — PT/OT/SLP PROGRESS
Physical Therapy Treatment and Discharge    Patient Name:  Luke Coley   MRN:  [id number]    Recommendations:     Discharge Recommendations:  home   Discharge Equipment Recommendations: none   Barriers to discharge: Decreased caregiver support    Assessment:     Luke Coley is a 44 y.o. male admitted with a medical diagnosis of Embolic stroke involving right middle cerebral artery.  He presents with the following impairments/functional limitations:  gait instability . Pt was pleasant and agreeable to therapy. Bed mobility and transfers performed independently and no AD. Pt ambulated 350' in hallway with supervision and no AD. Pt is slightly inattentive to L side of surroundings, but overall safe.    Rehab Prognosis: Good; patient no longer requires acute skilled PT services to address these deficits and reach maximum level of function.    Recent Surgery: * No surgery found *      Plan:     During this hospitalization, patient to be seen (Discharge from PT) to address the identified rehab impairments via gait training, therapeutic activities, therapeutic exercises, neuromuscular re-education and progress toward the following goals:    · Plan of Care Expires:  02/26/20    Subjective     Chief Complaint: Legs feel weak  Patient/Family Comments/goals: Return to PLOF  Pain/Comfort:  Pain Rating 1: 8/10  Location - Side 1: (apex)  Location - Orientation 1: midline  Location 1: head  Pain Addressed 1: Distraction  Pain Rating Post-Intervention 1: 7/10      Objective:     Communicated with nurse prior to session.  Patient found HOB elevated with telemetry upon PT entry to room.     General Precautions: Standard, aspiration, fall   Orthopedic Precautions:N/A   Braces: N/A     Functional Mobility:  · Bed Mobility:     · Supine to Sit: independence  · Transfers:     · Sit to Stand:  independence with no AD  · Bed to Chair: independence with  no AD  using  Step Transfer  · Gait: 350' with supervision and no AD.  Pt ambulates with decreased step length and occasional foot flat contact. No LOB noted.  · Balance: Good. Narrow ROWAN eyes with open/eyes closed and tandem stance performed bilaterally for 1 minute each as part of TA with SBA for safety.      AM-PAC 6 CLICK MOBILITY  Turning over in bed (including adjusting bedclothes, sheets and blankets)?: 4  Sitting down on and standing up from a chair with arms (e.g., wheelchair, bedside commode, etc.): 4  Moving from lying on back to sitting on the side of the bed?: 4  Moving to and from a bed to a chair (including a wheelchair)?: 4  Need to walk in hospital room?: 4  Climbing 3-5 steps with a railing?: 3  Basic Mobility Total Score: 23       Therapeutic Activities and Exercises:   Balance activities at bedrail: narrow BOW with eyes open/eyes closed, and tandem stance bilaterally - 1 minute each with no UE support  Standing hip abduction with SBA and BUE support - 10 reps bilaterally.  Pt educated on PT role/POC; plan for D/C; ability to walk in hallways; option to ask for OPPT referral. Pt verbalized understanding.    Patient left up in chair with all lines intact, call button in reach and nurse notified..    GOALS:   Multidisciplinary Problems     Physical Therapy Goals     Not on file          Multidisciplinary Problems (Resolved)        Problem: Physical Therapy Goal    Goal Priority Disciplines Outcome Goal Variances Interventions   Physical Therapy Goal   (Resolved)     PT, PT/OT Met     Description:  Goals to be met by: 20     Patient will increase functional independence with mobility by performin. Sit to stand transfer with Rolla - met 2020   2. Bed to chair transfer with Rolla using no AD. - met 2020   3. Gait  x 250 feet with Supervision using no AD. - met 2020                        Time Tracking:     PT Received On: 20  PT Start Time: 1059     PT Stop Time: 1121  PT Total Time (min): 22 min     Billable Minutes: Gait  Training 22    Treatment Type: Treatment  PT/PTA: PT     PTA Visit Number: 0     Aimee Nguyen, [facility]  01/29/2020

## 2020-01-29 NOTE — PLAN OF CARE
POC reviewed with pt.  Pt verbalized understanding.  Questions and concerns addressed.  Pt remains AOx4.  Pt c/o headache throughout shift, which was relieved with scheduled and prn medication. Fall/safety precautions maintained throughout shift.  Bed locked and in lowest position.  Call light within reach.  See flowsheet for full assessment and vital signs.  Will continue to monitor.      Problem: Fall Injury Risk  Goal: Absence of Fall and Fall-Related Injury  Outcome: Ongoing, Progressing     Problem: Adjustment to Illness (Stroke, Ischemic/Transient Ischemic Attack)  Goal: Optimal Coping  Outcome: Ongoing, Progressing     Problem: Functional Ability Impaired (Stroke, Ischemic/Transient Ischemic Attack)  Goal: Optimal Functional Ability  Outcome: Ongoing, Progressing     Problem: Pain (Stroke, Ischemic/Transient Ischemic Attack)  Goal: Acceptable Pain Control  Outcome: Ongoing, Progressing

## 2020-01-29 NOTE — PLAN OF CARE
01/29/20 1555   Post-Acute Status   Post-Acute Authorization Other   Other Status See Comments  (discuss treatment options for SA)      LOPEZ Coronel discussed with the patient treatment options concerning his SA misuse. LOPEZ Coronel and the patient discussed his current plans. The patient stated some options such as going back home, however the patient was not sure if SA treatment was a need at this time. IKER Coronel offered some verbal options and  reported she will provide the patient with a list of inpatient /out-patient treatment facilities as soon as possible.     Berna Flood LMSW
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
## 2020-01-29 NOTE — ASSESSMENT & PLAN NOTE
· Continue Valium taper: 5 mg once today then stop  · Continue withdrawal precautions (vital signs and CIWA Q4H) and PRN Ativan with withdrawal parameters (CIWA >8, SBP >160, DBP >100, HR >110)  · Continue to assess for willingness for rehab upon discharge. Today he denies wanting to go to rehab. Provided resource sheet for list of clinics, IOPs, and inpt rehab

## 2020-01-29 NOTE — PROGRESS NOTES
[facility]  Vascular Neurology  Comprehensive Stroke Center  Progress Note    Assessment/Plan:     * Embolic stroke involving right middle cerebral artery  44 y.o. man with medical history of HTN, fibromyalgia, alcohol abuse, suicidal ideation, elevated liver enzymes and bipolar disorder who presented to ED intoxicated. CT ordered in ED due to complaints of HA. CT with R parietal infarct. Admitted patient to stroke unit for further workup.     MRI Brain confirmed R posterior MCA infarct, as well as LILIANA globus pallidus (basal ganglia) disease, a pattern often seen with opioid use. TTE with large PFO vs small ASD. Stroke etiology of unclear source but concerns of paradoxical embolus present due to cardiac shunt on echo. Will refer pt to Dr. Damico for closure. Alternative etiologies would relate to drug use; regardless of etiology, cessation of use is strongly encouraged.    See psych sections below. Discussing safe discharge plan with patient regarding going home vs admission to rehab facility.     Antithrombotics for secondary stroke prevention: Antiplatelets: Aspirin: 325 mg daily  Statins for secondary stroke prevention and hyperlipidemia, if present:   Statins: Atorvastatin- 10 mg daily. LFTs improved, starting lower dose stain due to history of alcoholism.  Aggressive risk factor modification: HTN, Smoking, alcohol and drug use  Rehab efforts: The patient has been evaluated by a stroke team provider and the therapy needs have been fully considered based off the presenting complaints and exam findings. The following therapy evaluations are needed: PT evaluate and treat, OT evaluate and treat, SLP evaluate and treat - [facility]  Diagnostics ordered/pending: None   VTE prophylaxis: Heparin 5000 units SQ every 8 hours, place SCDs  BP parameters: Infarct: No intervention, SBP < 180    Scalp wound  See Cephalagia     Right to left cardiac shunt  TTE with R to L shunt, PFO vs ASD  Stroke  risk factor; Possible source of stroke  US BLE no evidence of DVT.  Will need outpatient follow up with Dr. Damico for possible closure.    Opioid use disorder, severe, on maintenance therapy, dependence  Stroke risk factor  Patient reports snorting small amount of drugs night prior to presentation while he believes was heroine. Tox screen positive for barbiturates and opiates  History of psychiatric hospitalization and alcohol/drug rehab in 2018.   with 71 prescriptions, 18 prescribers, and 15 pharmacies.    Addiction Psychiatry consulted, Appreciate assistance.  - Pt on Zubsolv 5.7-1.4 mg TID at home. Discussed with psych resident; ordered 8-2mg BID regimen inpatient. Now pursuing Suboxone taper (4mg bid x1 day, 4mg/2mg x1 day, 2mg bid x1 day then stop).     - Psych discouraging against abrupt discontinuation of benzos due to high risk for further severe withdrawal syndromes. Pt was on Valium 10mg TID yesterday - switched to Valium 5mg TID today. Per psych, likely ok with 5mg BID tomorrow 1/28, etc. Taper completed 1/29  - Pt's psychiatrist is Preeti Logan, though [facility] psych team is not sure if pt was following as closely as he needed; Team contacted Dr. Anderson and provider no longer will follow patient if he is not seeking sobriety. Currently weaning Suboxone at this time. Per psych they Discussed that he will need to f/u with a doctor outpt to get this medication. He expressed understanding. He was given contact info for Dr. Logan and Dr. Dong and instructed to try to call today to make an appt.     - Pt was counseled on cessation. Previously showed interest in inpatient rehab; CM on-call gave list of options. Though per Psych, pt later less amenable. Need to continue reinforcing.    Essential hypertension  Stroke risk factor  Patient on amlodipine at home but holding home BP meds acutely.  SBP < 180  BP currently at goal    Cytotoxic cerebral edema  Area of cytotoxic cerebral edema identified  when reviewing brain imaging in the territory of the R middle cerebral artery. There is mass effect associated with it. We will continue to monitor the patients clinical exam for any worsening of symptoms which may indicate expansion of the stroke or the area of the edema resulting in the clinical change. The pattern is suggestive of ESUS etiology.    Cephalalgia  Patient given multiple medications by ED staff for headache including Fioricet, dexamethasone, benadryl, remeron, and ketorolac.  D/c'd fioricet. Started Depakote (increased dose 1/27), Tylenol PRN.  Continuing valium taper and suboxone as well.    Scalp wound noted in area of head pain, no drainage, consulted dermatology: bacterial culture ordered - results pending, Mupirocin ordered BID    Elevated liver enzymes  History of transaminitis  Elevated liver enzymes on admission but trended down.  Starting low-dose Atorvastatin    Tobacco use disorder  Stroke risk factor  Consider nicotine patch if necessary   pt on importance of cessation for secondary stroke prevention prior to discharge.    Suicidal ideations  History of suicidal ideations  No evidence thus far currently but will monitor   Previous provider called pt's pharmacy and re-ordered home psychiatric medications.    Alcohol use disorder, severe, dependence  Pt reported presented to the ED intoxicated.  Monitoring for signs and symptoms of withdrawal per CIWA protocol.  Banana bag administered. Thiamine, MTVN, folic acid in place.    Psychiatry consulted, appreciate recs  Continuing Valium taper per psych recs (too high risk to stop abruptly without gradual wean). Taper completed 1/29.  Counseled pt on cessations. He initially expressed interest in possible inpatint rehab resources.    Generalized anxiety disorder  Pt's pharmacy called and home meds initially continued.   Per psych team 1/27, suspect Zyprexa was not a recent home med but that patient had stopped it.  Depakote previous  started for mood stabalizing & HA benefits. Per Psych, could d/c Zyprexa and increase Depakote to simplify psych regimen as well as continue to improve pt's report of HAs.    Pt now appears more comfortable, less anxious. Will continue monitoring.  Currently on Depakote 500 mg BID          1/25 started on diazepam for possible withdrawal, continuing to complain of headache, psychiatry consulted   1/26 continuing valium taper, started suboxone yesterday due to persistent headache,   1/27: Increased Suboxone and continuing Valium taper per addiction psych assistance. D/c'd Zyprexa and increased Depakote. Troponin with downtrend.  1/28: wife at bedside overnight, night team discussed questions/concerns. Head wound on scalp without drainage, ordered ice packs and bactroban, consulted dermatology. Will attempt discharge today but want to ensure suboxone follow up. Patient completed 21 days at a rehab earlier this month but did not complete rehab due to insurance coverage. When he was admitted to rehab facility, he reports that facility discarded his suboxone per their protocol. When he left the facility, he did not have suboxone. Plans to discuss patient further with psychiatry and contact patient's wife  1/29 - Continuing Valium and Suboxone taper. Discussed with patient regarding [facility] - at this time does not want to go to that location. He would think about going to another rehab facility. CM/SW gathering information regarding other facilities. Bacterial culture pending.     STROKE DOCUMENTATION        NIH Scale:  1a. Level of Consciousness: 0-->Alert, keenly responsive  1b. LOC Questions: 0-->Answers both questions correctly  1c. LOC Commands: 0-->Performs both tasks correctly  2. Best Gaze: 0-->Normal  3. Visual: 1-->Partial hemianopia  4. Facial Palsy: 1-->Minor paralysis (flattened nasolabial fold, asymmetry on smiling)  5a. Motor Arm, Left: 0-->No drift, limb holds 90 (or 45) degrees for full 10  secs  5b. Motor Arm, Right: 0-->No drift, limb holds 90 (or 45) degrees for full 10 secs  6a. Motor Leg, Left: 0-->No drift, leg holds 30 degree position for full 5 secs  6b. Motor Leg, Right: 0-->No drift, leg holds 30 degree position for full 5 secs  7. Limb Ataxia: 0-->Absent  8. Sensory: 1-->Mild-to-moderate sensory loss, patient feels pinprick is less sharp or is dull on the affected side, or there is a loss of superficial pain with pinprick, but patient is aware of being touched  9. Best Language: 0-->No aphasia, normal  10. Dysarthria: 0-->Normal  11. Extinction and Inattention (formerly Neglect): 0-->No abnormality  Total (NIH Stroke Scale): 3       Modified [place] Score: 0  [place] Coma Scale:    ABCD2 Score:    [id number] Score:   HAS -BLED Score:   ICH Score:   Hunt & Godwin Classification:      Hemorrhagic change of an Ischemic Stroke: Does this patient have an ischemic stroke with hemorrhagic changes? No     Neurologic Chief Complaint: Headache    Subjective:     Interval History: Patient is seen for follow-up neurological assessment and treatment recommendations:     Continuing Valium and Suboxone taper. Discussed with patient regarding [facility] - at this time does not want to go to that location. He would think about going to another rehab facility. CM/SW gathering information regarding other facilities. Bacterial culture pending.     HPI, Past Medical, Family, and Social History remains the same as documented in the initial encounter.     Review of Systems   Constitutional: Negative for chills and fever.   Eyes: Positive for visual disturbance. Negative for discharge.   Gastrointestinal: Negative for nausea and vomiting.   Skin: Positive for wound (scalp).   Neurological: Positive for facial asymmetry, numbness and headaches. Negative for speech difficulty and weakness.   Psychiatric/Behavioral: Negative for agitation and decreased concentration.     Scheduled Meds:   aspirin  325 mg  Oral Daily    atorvastatin  10 mg Oral Daily    buprenorphine-naloxone 2-0.5 mg  2 mg Sublingual BID    [START ON 1/30/2020] buprenorphine-naloxone 2-0.5 mg  2 mg Sublingual Once    buPROPion  300 mg Oral Daily    divalproex  500 mg Oral Q12H    DULoxetine  60 mg Oral Daily    folic acid  1 mg Oral Daily    heparin (porcine)  5,000 Units Subcutaneous Q8H    multivitamin  1 tablet Oral Daily    mupirocin   Topical (Top) BID    pregabalin  300 mg Oral BID    thiamine  100 mg Oral Daily     Continuous Infusions:   sodium chloride 0.9%       PRN Meds:acetaminophen, labetalol, lorazepam, sodium chloride 0.9%, sodium chloride 0.9%    Objective:     Vital Signs (Most Recent):  Temp: 98.6 °F (37 °C) (01/29/20 1158)  Pulse: 76 (01/29/20 1158)  Resp: 16 (01/29/20 0911)  BP: (!) 139/90 (01/29/20 1158)  SpO2: 95 % (01/29/20 0911)  BP Location: Right arm    Vital Signs Range (Last 24H):  Temp:  [96.4 °F (35.8 °C)-98.6 °F (37 °C)]   Pulse:  [69-86]   Resp:  [16-18]   BP: (123-162)/(76-99)   SpO2:  [94 %-97 %]   BP Location: Right arm    Physical Exam   Constitutional: He is oriented to person, place, and time. He appears well-developed and well-nourished. No distress.   HENT:   Head: Normocephalic and atraumatic.   Eyes: EOM are normal.   Cardiovascular: Normal rate.   Pulmonary/Chest: Effort normal. No respiratory distress.   Musculoskeletal: Normal range of motion.   Neurological: He is alert and oriented to person, place, and time.   Skin: Skin is warm and dry.   Small 2 cm scalp wound with surrounding edema  No drainage noted   Psychiatric: Cognition and memory are not impaired. He is attentive.   Vitals reviewed.          Neurological Exam:   LOC: alert  Attention Span: Good   Language: No aphasia  Articulation: No dysarthria  Orientation: Person, Place, Time   Visual Fields: Full  EOM (CN III, IV, VI): L upper quadrantanopsia  Facial Movement (CN VII): Lower facial weakness on the Left  Motor: Arm left  Normal  5/5  Leg left  Normal 5/5  Arm right  Normal 5/5  Leg right Normal 5/5  Cebellar: No evidence of appendicular or axial ataxia  Sensation: Matthew-hypoesthesia left  (improved)   Tone: Normal tone throughout    Laboratory:  CMP:   No results for input(s): GLUCOSE, CALCIUM, ALBUMIN, PROT, NA, K, CO2, CL, BUN, CREATININE, ALKPHOS, ALT, AST, BILITOT in the last 24 hours.  CBC:   Recent Labs   Lab 01/26/20  0405   WBC 8.98   RBC 4.00*   HGB 12.3*   HCT 37.5*   *   MCV 94   MCH 30.8   MCHC 32.8     Lipid Panel:   Recent Labs   Lab 01/24/20  1127   CHOL 128   LDLCALC 54.0*   HDL 53   TRIG 105     Coagulation:   Recent Labs   Lab 01/25/20  0313   INR 1.0   APTT 29.3     Platelet Aggregation Study: No results for input(s): PLTAGG, PLTAGINTERP, PLTAGREGLACO, ADPPLTAGGREG in the last 168 hours.  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    CT Head. Date: 01/25/20  Moderate size region of parenchymal hypoattenuation and sulcal effacement involving the right parietooccipital region suggestive of evolving recent infarct without evidence of hemorrhagic conversion.  Additional, punctate hypodensities are present within the bilateral basal ganglia which appear more conspicuous from prior examination and additional regions of acute ischemia are not excluded.   No evidence of midline shift or hydrocephalus.    CT Head. Date: 01/24/20  Mild moderate hypoattenuation with sulcal effacement right superior occipital/inferior parietal lobes peripherally concerning for recent infarction though indeterminate. Clinical correlation and further evaluation with MRI recommended. There is no evidence for hydrocephalus or acute intracranial hemorrhage.    Vessel Imaging     CTA head and neck. Date:  01/25/20  No evidence of high-grade stenosis or major vascular occlusion.  Evolving subtotal right MCA infarct.  Evolving acute bilateral basal ganglia lacunar infarcts  Age advanced cerebral volume loss.    MRA Neck WO Contrast Date: 01/25/20  Technically very limited study.  Abnormal right carotid bulb, artifact versus stenosis.  Correlate with carotid Doppler.      Cardiac Imaging     TTE with bubble contrast. Date: 01/24/20  · Concentric left ventricular remodeling.  · Normal left ventricular systolic function. The estimated ejection fraction is 55%.  · The right ventricle is at the upper limit of normal in size with normal right ventricular systolic function.  · Normal LV diastolic function.  · Normal central venous pressure (3 mmHg).  · With agitated saline administration, there is evidence of considerable right to left shunting consistent with a large PFO or small ASD.      Jacklyn Fritz NP  [facility]  Department of Vascular Neurology   [facility]

## 2020-01-29 NOTE — ASSESSMENT & PLAN NOTE
Stroke risk factor  Patient reports snorting small amount of drugs night prior to presentation while he believes was heroine. Tox screen positive for barbiturates and opiates  History of psychiatric hospitalization and alcohol/drug rehab in 2018.   with 71 prescriptions, 18 prescribers, and 15 pharmacies.    Addiction Psychiatry consulted, Appreciate assistance.  - Pt on Zubsolv 5.7-1.4 mg TID at home. Discussed with psych resident; ordered 8-2mg BID regimen inpatient. Now pursuing Suboxone taper (4mg bid x1 day, 4mg/2mg x1 day, 2mg bid x1 day then stop).     - Psych discouraging against abrupt discontinuation of benzos due to high risk for further severe withdrawal syndromes. Pt was on Valium 10mg TID yesterday - switched to Valium 5mg TID today. Per psych, likely ok with 5mg BID tomorrow 1/28, etc. Taper completed 1/29  - Pt's psychiatrist is Preeti Logan, though [facility] psych team is not sure if pt was following as closely as he needed; Team contacted Dr. Anderson and provider no longer will follow patient if he is not seeking sobriety. Currently weaning Suboxone at this time. Per psych they Discussed that he will need to f/u with a doctor outpt to get this medication. He expressed understanding. He was given contact info for Dr. Logan and Dr. Dong and instructed to try to call today to make an appt.     - Pt was counseled on cessation. Previously showed interest in inpatient rehab; CM on-call gave list of options. Though per Psych, pt later less amenable. Need to continue reinforcing.

## 2020-01-29 NOTE — PLAN OF CARE
Problem: Physical Therapy Goal  Goal: Physical Therapy Goal  Description  Goals to be met by: 20     Patient will increase functional independence with mobility by performin. Sit to stand transfer with [place] - met 2020   2. Bed to chair transfer with [place] using no AD. - met 2020   3. Gait  x 250 feet with [facility] using no AD. - met 2020       Outcome: Met   Pt seen. All goals met. PT services no longer needed at this time. Aimee Nguyen, SPT

## 2020-01-29 NOTE — ASSESSMENT & PLAN NOTE
Patient given multiple medications by ED staff for headache including Fioricet, dexamethasone, benadryl, remeron, and ketorolac.  D/c'd fioricet. Started Depakote (increased dose 1/27), Tylenol PRN.  Continuing valium taper and suboxone as well.    Scalp wound noted in area of head pain, no drainage, consulted dermatology: bacterial culture ordered - results pending, Mupirocin ordered BID

## 2020-01-29 NOTE — PLAN OF CARE
Problem: SLP Goal  Goal: SLP Goal  Description  Speech Language Pathology Goals  Goals expected to be met by 1/31/2020  1. Pt will tolerate a regular diet with thin liquids w/o overt S/S aspiration, I'ly  2. Pt will participate in further assessment of reading, writing and visospatial skills  3. Pt will complete fx math tasks for time management with 90% accuracy, MOD I  4. Pt will recall at least 2/3 unrelated items for immediate recall and post 3 minute filled delay, 90% of attempts, MOD I  5. Educate Pt and family on S/S aspiration and aspiration precautions       Outcome: Ongoing, Progressing     Pt progressing with goals. Pt with moderate difficulty on clock drawing and time management tasks. Questions re: meal portions and medications reviewed with RN/MD team. ST to continue to follow per POC.     NED Haskins., [facility]-SLP  Speech-Language Pathology  Pager: [phone]  1/29/2020

## 2020-01-29 NOTE — ASSESSMENT & PLAN NOTE
Pt's pharmacy called and home meds initially continued.   Per psych team 1/27, suspect Zyprexa was not a recent home med but that patient had stopped it.  Depakote previous started for mood stabalizing & HA benefits. Per Psych, could d/c Zyprexa and increase Depakote to simplify psych regimen as well as continue to improve pt's report of HAs.    Pt now appears more comfortable, less anxious. Will continue monitoring.  Currently on Depakote 500 mg BID

## 2020-01-29 NOTE — ASSESSMENT & PLAN NOTE
Pt reported presented to the ED intoxicated.  Monitoring for signs and symptoms of withdrawal per CIWA protocol.  Banana bag administered. Thiamine, MTVN, folic acid in place.    Psychiatry consulted, appreciate recs  Continuing Valium taper per psych recs (too high risk to stop abruptly without gradual wean). Taper completed 1/29.  Counseled pt on cessations. He initially expressed interest in possible inpatint rehab resources.

## 2020-01-29 NOTE — SUBJECTIVE & OBJECTIVE
"Interval History: see hospital course    Family History     Problem Relation (Age of Onset)    Benign prostatic hyperplasia Father    Breast cancer Mother (53)    Down syndrome Daughter    Hashimoto's thyroiditis Sister    Hypertension Father    Irritable bowel syndrome Sister    No Known Problems Sister        Tobacco Use    Smoking status: Current Every Day Smoker     Packs/day: 0.05     Types: Cigarettes    Smokeless tobacco: Never Used    Tobacco comment: Currently uses nicorette gum and lozenges.   Substance and Sexual Activity    Alcohol use: No    Drug use: No    Sexual activity: Yes     Partners: Female     Birth control/protection: None     Psychotherapeutics (From admission, onward)    Start     Stop Route Frequency Ordered    01/27/20 1633  lorazepam injection 2 mg      -- IV Every hour PRN 01/27/20 1637    01/25/20 0900  buPROPion 24 hr tablet 300 mg      -- Oral Daily 01/24/20 1855 01/25/20 0900  DULoxetine DR capsule 60 mg      -- Oral Daily 01/24/20 1855           Review of Systems   Constitutional: Positive for diaphoresis.   Gastrointestinal: Negative for vomiting.   Skin: Positive for wound (back of head).   Neurological: Positive for headaches. Negative for tremors and seizures.   Psychiatric/Behavioral: Negative for agitation, confusion, dysphoric mood, hallucinations, sleep disturbance and suicidal ideas. The patient is nervous/anxious.      Objective:     Vital Signs (Most Recent):  Temp: 98.6 °F (37 °C) (01/29/20 1158)  Pulse: 76 (01/29/20 1158)  Resp: 16 (01/29/20 0911)  BP: (!) 139/90 (01/29/20 1158)  SpO2: 95 % (01/29/20 0911) Vital Signs (24h Range):  Temp:  [96.4 °F (35.8 °C)-98.6 °F (37 °C)] 98.6 °F (37 °C)  Pulse:  [69-86] 76  Resp:  [16-18] 16  SpO2:  [94 %-97 %] 95 %  BP: (123-162)/(76-99) 139/90     Height: 5' 8" (172.7 cm)  Weight: 81.6 kg (180 lb)  Body mass index is 27.37 kg/m².      Intake/Output Summary (Last 24 hours) at 1/29/2020 4775  Last data filed at 1/29/2020 " "0600  Gross per 24 hour   Intake 480 ml   Output --   Net 480 ml       Physical Exam   Constitutional: He appears well-developed and well-nourished. No distress.   Appears mildly uncomfortable   Neurological:   No tremors in BUE   Skin: He is not diaphoretic.   Psychiatric:   Mental Status Exam:  Appearance: mildly uncomfortable, holding head durign conversation, good hygiene and grooming, dressed in hospital gown, average weight  Behavior/Cooperation: calm, cooperative  Speech: normal rate/tone/volume  Language: english  Mood: "nervous"  Affect: constricted, mood-congruent  Thought Process: goal-directed  Associations: no ADRIÁN  Thought Content: denies SI/HI/AVH  Sensorium: alert, awake   Orientation: person, place, month, situationn  Memory: remote intact, recent impaired  Attention/Concentration: intact  Fund of Knowledge: intact   Abstraction: did not assess  Insight: fair   Judgement: fair    Nursing note and vitals reviewed.         Significant Labs:   Last 24 Hours:   Recent Lab Results       01/28/20 1927        Aerobic Bacterial Culture STAPHYLOCOCCUS AUREUS  Many  Susceptibility pending  [P]           Significant Imaging: I have reviewed all pertinent imaging results/findings within the past 24 hours.  "

## 2020-01-29 NOTE — ASSESSMENT & PLAN NOTE
Need to taper off benzos as pt has no way to get them outpt currently, and did not have supply at home. Suboxone 2 mg BID today then 2 mg once daily tomorrow AM, then stop.    - Opiate withdrawal PRNs for comfort may include the following:  Bentyl 20 mg q6h PRN stomach cramps  Vistaril 50 mg TID PRN anxiety, insomnia, or itching  Ibuprofen 600 mg q6h PRN pain  Imodium 2 mg QID PRN diarrhea  Robaxin 500 mg QID PRN muscle spasms  Zofran 8 mg q8h PRN nausea  Clonidine 0.1 mg PO q4h PRN opiate withdrawal (hold Clonidine for HR<60, Systolic<90, Diastolic<60)  Afrin 0.05% nasal spray, 2 sprays BID PRN congestion for 3 days

## 2020-01-29 NOTE — SUBJECTIVE & OBJECTIVE
Neurologic Chief Complaint: Headache    Subjective:     Interval History: Patient is seen for follow-up neurological assessment and treatment recommendations:     Continuing Valium and Suboxone taper. Discussed with patient regarding [facility] - at this time does not want to go to that location. He would think about going to another rehab facility. CM/SW gathering information regarding other facilities. Bacterial culture pending.     HPI, Past Medical, Family, and Social History remains the same as documented in the initial encounter.     Review of Systems   Constitutional: Negative for chills and fever.   Eyes: Positive for visual disturbance. Negative for discharge.   Gastrointestinal: Negative for nausea and vomiting.   Skin: Positive for wound (scalp).   Neurological: Positive for facial asymmetry, numbness and headaches. Negative for speech difficulty and weakness.   Psychiatric/Behavioral: Negative for agitation and decreased concentration.     Scheduled Meds:   aspirin  325 mg Oral Daily    atorvastatin  10 mg Oral Daily    buprenorphine-naloxone 2-0.5 mg  2 mg Sublingual BID    [START ON 1/30/2020] buprenorphine-naloxone 2-0.5 mg  2 mg Sublingual Once    buPROPion  300 mg Oral Daily    divalproex  500 mg Oral Q12H    DULoxetine  60 mg Oral Daily    folic acid  1 mg Oral Daily    heparin (porcine)  5,000 Units Subcutaneous Q8H    multivitamin  1 tablet Oral Daily    mupirocin   Topical (Top) BID    pregabalin  300 mg Oral BID    thiamine  100 mg Oral Daily     Continuous Infusions:   sodium chloride 0.9%       PRN Meds:acetaminophen, labetalol, lorazepam, sodium chloride 0.9%, sodium chloride 0.9%    Objective:     Vital Signs (Most Recent):  Temp: 98.6 °F (37 °C) (01/29/20 1158)  Pulse: 76 (01/29/20 1158)  Resp: 16 (01/29/20 0911)  BP: (!) 139/90 (01/29/20 1158)  SpO2: 95 % (01/29/20 0911)  BP Location: Right arm    Vital Signs Range (Last 24H):  Temp:  [96.4 °F (35.8 °C)-98.6 °F (37  °C)]   Pulse:  [69-86]   Resp:  [16-18]   BP: (123-162)/(76-99)   SpO2:  [94 %-97 %]   BP Location: Right arm    Physical Exam   Constitutional: He is oriented to person, place, and time. He appears well-developed and well-nourished. No distress.   HENT:   Head: Normocephalic and atraumatic.   Eyes: EOM are normal.   Cardiovascular: Normal rate.   Pulmonary/Chest: Effort normal. No respiratory distress.   Musculoskeletal: Normal range of motion.   Neurological: He is alert and oriented to person, place, and time.   Skin: Skin is warm and dry.   Small 2 cm scalp wound with surrounding edema  No drainage noted   Psychiatric: Cognition and memory are not impaired. He is attentive.   Vitals reviewed.          Neurological Exam:   LOC: alert  Attention Span: Good   Language: No aphasia  Articulation: No dysarthria  Orientation: Person, Place, Time   Visual Fields: Full  EOM (CN III, IV, VI): L upper quadrantanopsia  Facial Movement (CN VII): Lower facial weakness on the Left  Motor: Arm left  Normal 5/5  Leg left  Normal 5/5  Arm right  Normal 5/5  Leg right Normal 5/5  Cebellar: No evidence of appendicular or axial ataxia  Sensation: Matthew-hypoesthesia left (improved)   Tone: Normal tone throughout    Laboratory:  CMP:   No results for input(s): GLUCOSE, CALCIUM, ALBUMIN, PROT, NA, K, CO2, CL, BUN, CREATININE, ALKPHOS, ALT, AST, BILITOT in the last 24 hours.  CBC:   Recent Labs   Lab 01/26/20  0405   WBC 8.98   RBC 4.00*   HGB 12.3*   HCT 37.5*   *   MCV 94   MCH 30.8   MCHC 32.8     Lipid Panel:   Recent Labs   Lab 01/24/20  1127   CHOL 128   LDLCALC 54.0*   HDL 53   TRIG 105     Coagulation:   Recent Labs   Lab 01/25/20  0313   INR 1.0   APTT 29.3     Platelet Aggregation Study: No results for input(s): PLTAGG, PLTAGINTERP, PLTAGREGLACO, ADPPLTAGGREG in the last 168 hours.  Hgb A1C:   Recent Labs   Lab 01/24/20  1545   HGBA1C 5.2     TSH:   Recent Labs   Lab 01/24/20  1127   TSH 0.625       Diagnostic Results      Brain Imaging     MRI Brain WO Contrast Date: 01/25/20  Large right subtotal MCA vascular territory infarct without mass effect or hemorrhage.    CT Head. Date: 01/25/20  Moderate size region of parenchymal hypoattenuation and sulcal effacement involving the right parietooccipital region suggestive of evolving recent infarct without evidence of hemorrhagic conversion.  Additional, punctate hypodensities are present within the bilateral basal ganglia which appear more conspicuous from prior examination and additional regions of acute ischemia are not excluded.   No evidence of midline shift or hydrocephalus.    CT Head. Date: 01/24/20  Mild moderate hypoattenuation with sulcal effacement right superior occipital/inferior parietal lobes peripherally concerning for recent infarction though indeterminate. Clinical correlation and further evaluation with MRI recommended. There is no evidence for hydrocephalus or acute intracranial hemorrhage.    Vessel Imaging     CTA head and neck. Date: 01/25/20  No evidence of high-grade stenosis or major vascular occlusion.  Evolving subtotal right MCA infarct.  Evolving acute bilateral basal ganglia lacunar infarcts  Age advanced cerebral volume loss.    MRA Neck WO Contrast Date: 01/25/20  Technically very limited study.  Abnormal right carotid bulb, artifact versus stenosis.  Correlate with carotid Doppler.      Cardiac Imaging     TTE with bubble contrast. Date: 01/24/20  · Concentric left ventricular remodeling.  · Normal left ventricular systolic function. The estimated ejection fraction is 55%.  · The right ventricle is at the upper limit of normal in size with normal right ventricular systolic function.  · Normal LV diastolic function.  · Normal central venous pressure (3 mmHg).  · With agitated saline administration, there is evidence of considerable right to left shunting consistent with a large PFO or small ASD.

## 2020-01-29 NOTE — PT/OT/SLP PROGRESS
"Speech Language Pathology Treatment    Patient Name:  Luke Coley   MRN:  [id number]  Admitting Diagnosis: Embolic stroke involving right middle cerebral artery    Recommendations:                 General Recommendations:  Cognitive-linguistic therapy  Diet recommendations:  Regular, Liquid Diet Level: Thin   Aspiration Precautions: Standard aspiration precautions   General Precautions: Standard, aspiration, fall  Communication strategies:  none    Subjective     SLP reviewed Pt with RN, RN aware of request for medication  Pt presents calm  He explains, "I am having a hard time with the clock"    Pain/Comfort:  · Pain Rating 1: 0/10    Objective:     Has the patient been evaluated by SLP for swallowing?   Yes  Keep patient NPO? No   Current Respiratory Status: room air      Pt seen for cognitive-linguistic therapy.  He was found awake and alert in bed. He was oriented x4. He attended to task without need for redirection.  He completed writing tasks with R dominant hand for word and sentence to dictation tasks WNL, I'ly.  He completed reading comprehension tasks at the sentence and short paragraph level WNL provided extra time. He demonstrated moderate difficulty on clock drawing tasks as characterized by decreased amount of numbers in clock and incomplete number of hands as Pt discontinued task and stated "I don't know why this is so hard." He completed fx math problems for time management with 75% accuracy provided moderate verbal and visual cues.  He was educated extensively on safety precautions for math/time/medication management skills.  Pt verbalized understanding and expressed frustration and anxiety with current medical condition and varying recommendations from MDs. Pt requested pen and paper at bedside to be able to keep track of recommendations from different medical teams and asked for clarification on medications and option for larger portions on meal trays. SLP provided active listening, " encouragement and assured Pts questions would be reviewed with MD team. Following session, RN and MD team notified of Patient's questions/cocnerns. Pt remained upright in bed with call light in reach upon SLP exit from room.     Assessment:     Luke Coley is a 44 y.o. male with an SLP diagnosis of Mild Higher-Level Cognitive Impairment .  He presents with moderate difficulty on clock drawing and functional math tasks for time management this service day. ST to continue to follow.     Goals:   Multidisciplinary Problems     SLP Goals        Problem: SLP Goal    Goal Priority Disciplines Outcome   SLP Goal     SLP Ongoing, Progressing   Description:  Speech Language Pathology Goals  Goals expected to be met by 1/31/2020  1. Pt will tolerate a regular diet with thin liquids w/o overt S/S aspiration, I'ly  2. Pt will participate in further assessment of reading, writing and visospatial skills  3. Pt will complete fx math tasks for time management with 90% accuracy, MOD I  4. Pt will recall at least 2/3 unrelated items for immediate recall and post 3 minute filled delay, 90% of attempts, MOD I  5. Educate Pt and family on S/S aspiration and aspiration precautions                        Plan:     · Patient to be seen:  3 x/week   · Plan of Care expires:  02/23/20  · Plan of Care reviewed with:  patient   · SLP Follow-Up:  Yes       Discharge recommendations:  outpatient speech therapy     Time Tracking:     SLP Treatment Date:   01/29/20  Speech Start Time:  1344  Speech Stop Time:  1408     Speech Total Time (min):  24 min    Billable Minutes: Speech Therapy Individual 9 and Seld Care/Home Management Training 15    AMIRAH Haskins, [facility]-SLP  Speech-Language Pathology  Pager: [phone]      01/29/2020

## 2020-01-29 NOTE — ASSESSMENT & PLAN NOTE
44 y.o. man with medical history of HTN, fibromyalgia, alcohol abuse, suicidal ideation, elevated liver enzymes and bipolar disorder who presented to ED intoxicated. CT ordered in ED due to complaints of HA. CT with R parietal infarct. Admitted patient to stroke unit for further workup.     MRI Brain confirmed R posterior MCA infarct, as well as LILIANA globus pallidus (basal ganglia) disease, a pattern often seen with opioid use. TTE with large PFO vs small ASD. Stroke etiology of unclear source but concerns of paradoxical embolus present due to cardiac shunt on echo. Will refer pt to Dr. Damico for closure. Alternative etiologies would relate to drug use; regardless of etiology, cessation of use is strongly encouraged.    See psych sections below. Discussing safe discharge plan with patient regarding going home vs admission to rehab facility.     Antithrombotics for secondary stroke prevention: Antiplatelets: Aspirin: 325 mg daily  Statins for secondary stroke prevention and hyperlipidemia, if present:   Statins: Atorvastatin- 10 mg daily. LFTs improved, starting lower dose stain due to history of alcoholism.  Aggressive risk factor modification: HTN, Smoking, alcohol and drug use  Rehab efforts: The patient has been evaluated by a stroke team provider and the therapy needs have been fully considered based off the presenting complaints and exam findings. The following therapy evaluations are needed: PT evaluate and treat, OT evaluate and treat, SLP evaluate and treat - [facility]  Diagnostics ordered/pending: None   VTE prophylaxis: Heparin 5000 units SQ every 8 hours, place SCDs  BP parameters: Infarct: No intervention, SBP < 180

## 2020-01-30 PROBLEM — A49.02 INFECTION OF WOUND DUE TO METHICILLIN RESISTANT STAPHYLOCOCCUS AUREUS (MRSA): Status: ACTIVE | Noted: 2020-01-28

## 2020-01-30 LAB
ALBUMIN SERPL BCP-MCNC: 3.6 G/DL (ref 3.5–5.2)
ALP SERPL-CCNC: 77 U/L (ref 55–135)
ALT SERPL W/O P-5'-P-CCNC: 28 U/L (ref 10–44)
ANION GAP SERPL CALC-SCNC: 12 MMOL/L (ref 8–16)
AST SERPL-CCNC: 19 U/L (ref 10–40)
BACTERIA SPEC AEROBE CULT: ABNORMAL
BASOPHILS # BLD AUTO: 0.04 K/UL (ref 0–0.2)
BASOPHILS NFR BLD: 0.7 % (ref 0–1.9)
BILIRUB SERPL-MCNC: 0.3 MG/DL (ref 0.1–1)
BUN SERPL-MCNC: 13 MG/DL (ref 6–20)
CALCIUM SERPL-MCNC: 9.2 MG/DL (ref 8.7–10.5)
CHLORIDE SERPL-SCNC: 99 MMOL/L (ref 95–110)
CO2 SERPL-SCNC: 28 MMOL/L (ref 23–29)
CREAT SERPL-MCNC: 0.9 MG/DL (ref 0.5–1.4)
DIFFERENTIAL METHOD: ABNORMAL
EOSINOPHIL # BLD AUTO: 0.2 K/UL (ref 0–0.5)
EOSINOPHIL NFR BLD: 2.8 % (ref 0–8)
ERYTHROCYTE [DISTWIDTH] IN BLOOD BY AUTOMATED COUNT: 13.1 % (ref 11.5–14.5)
EST. GFR  (AFRICAN AMERICAN): >60 ML/MIN/1.73 M^2
EST. GFR  (NON AFRICAN AMERICAN): >60 ML/MIN/1.73 M^2
GLUCOSE SERPL-MCNC: 95 MG/DL (ref 70–110)
HCT VFR BLD AUTO: 39.1 % (ref 40–54)
HGB BLD-MCNC: 13.5 G/DL (ref 14–18)
IMM GRANULOCYTES # BLD AUTO: 0.05 K/UL (ref 0–0.04)
IMM GRANULOCYTES NFR BLD AUTO: 0.8 % (ref 0–0.5)
LYMPHOCYTES # BLD AUTO: 1.9 K/UL (ref 1–4.8)
LYMPHOCYTES NFR BLD: 30.9 % (ref 18–48)
MCH RBC QN AUTO: 32 PG (ref 27–31)
MCHC RBC AUTO-ENTMCNC: 34.5 G/DL (ref 32–36)
MCV RBC AUTO: 93 FL (ref 82–98)
MONOCYTES # BLD AUTO: 0.6 K/UL (ref 0.3–1)
MONOCYTES NFR BLD: 10.2 % (ref 4–15)
NEUTROPHILS # BLD AUTO: 3.3 K/UL (ref 1.8–7.7)
NEUTROPHILS NFR BLD: 54.6 % (ref 38–73)
NRBC BLD-RTO: 0 /100 WBC
PLATELET # BLD AUTO: 154 K/UL (ref 150–350)
PMV BLD AUTO: 11 FL (ref 9.2–12.9)
POTASSIUM SERPL-SCNC: 4 MMOL/L (ref 3.5–5.1)
PROT SERPL-MCNC: 6.7 G/DL (ref 6–8.4)
RBC # BLD AUTO: 4.22 M/UL (ref 4.6–6.2)
SODIUM SERPL-SCNC: 139 MMOL/L (ref 136–145)
WBC # BLD AUTO: 6.08 K/UL (ref 3.9–12.7)

## 2020-01-30 PROCEDURE — 85025 COMPLETE CBC W/AUTO DIFF WBC: CPT

## 2020-01-30 PROCEDURE — 99232 SBSQ HOSP IP/OBS MODERATE 35: CPT | Mod: ,,, | Performed by: PSYCHIATRY & NEUROLOGY

## 2020-01-30 PROCEDURE — 11000001 HC ACUTE MED/SURG PRIVATE ROOM

## 2020-01-30 PROCEDURE — 63600175 PHARM REV CODE 636 W HCPCS: Performed by: NURSE PRACTITIONER

## 2020-01-30 PROCEDURE — 99233 PR SUBSEQUENT HOSPITAL CARE,LEVL III: ICD-10-PCS | Mod: ,,, | Performed by: PSYCHIATRY & NEUROLOGY

## 2020-01-30 PROCEDURE — 99233 SBSQ HOSP IP/OBS HIGH 50: CPT | Mod: ,,, | Performed by: PSYCHIATRY & NEUROLOGY

## 2020-01-30 PROCEDURE — 36415 COLL VENOUS BLD VENIPUNCTURE: CPT

## 2020-01-30 PROCEDURE — 25000003 PHARM REV CODE 250: Performed by: PHYSICIAN ASSISTANT

## 2020-01-30 PROCEDURE — 80053 COMPREHEN METABOLIC PANEL: CPT

## 2020-01-30 PROCEDURE — 99232 PR SUBSEQUENT HOSPITAL CARE,LEVL II: ICD-10-PCS | Mod: ,,, | Performed by: PSYCHIATRY & NEUROLOGY

## 2020-01-30 PROCEDURE — 63600175 PHARM REV CODE 636 W HCPCS: Performed by: PHYSICIAN ASSISTANT

## 2020-01-30 PROCEDURE — 25000003 PHARM REV CODE 250: Performed by: NURSE PRACTITIONER

## 2020-01-30 RX ORDER — DULOXETIN HYDROCHLORIDE 60 MG/1
60 CAPSULE, DELAYED RELEASE ORAL DAILY
Qty: 30 CAPSULE | Refills: 0 | Status: SHIPPED | OUTPATIENT
Start: 2020-01-31 | End: 2020-01-31

## 2020-01-30 RX ORDER — BUPRENORPHINE HYDROCHLORIDE AND NALOXONE HYDROCHLORIDE DIHYDRATE 2; .5 MG/1; MG/1
2 TABLET SUBLINGUAL DAILY
Status: COMPLETED | OUTPATIENT
Start: 2020-01-30 | End: 2020-01-30

## 2020-01-30 RX ORDER — BACLOFEN 5 MG/1
5 TABLET ORAL 3 TIMES DAILY PRN
Qty: 20 TABLET | Refills: 0 | Status: SHIPPED | OUTPATIENT
Start: 2020-01-30 | End: 2020-01-31

## 2020-01-30 RX ORDER — SULFAMETHOXAZOLE AND TRIMETHOPRIM 800; 160 MG/1; MG/1
2 TABLET ORAL 2 TIMES DAILY
Qty: 28 TABLET | Refills: 0 | Status: SHIPPED | OUTPATIENT
Start: 2020-01-30 | End: 2020-01-31 | Stop reason: HOSPADM

## 2020-01-30 RX ORDER — BUPROPION HYDROCHLORIDE 300 MG/1
300 TABLET ORAL DAILY
Qty: 30 TABLET | Refills: 0 | Status: SHIPPED | OUTPATIENT
Start: 2020-01-31 | End: 2020-01-31

## 2020-01-30 RX ORDER — DIVALPROEX SODIUM 500 MG/1
500 TABLET, DELAYED RELEASE ORAL EVERY 12 HOURS
Qty: 60 TABLET | Refills: 0 | Status: SHIPPED | OUTPATIENT
Start: 2020-01-30 | End: 2020-01-31

## 2020-01-30 RX ORDER — ATORVASTATIN CALCIUM 10 MG/1
10 TABLET, FILM COATED ORAL DAILY
Qty: 30 TABLET | Refills: 0 | Status: SHIPPED | OUTPATIENT
Start: 2020-01-31 | End: 2020-01-31

## 2020-01-30 RX ORDER — ASPIRIN 325 MG
325 TABLET ORAL DAILY
Qty: 30 TABLET | Refills: 0 | Status: SHIPPED | OUTPATIENT
Start: 2020-01-31 | End: 2020-01-31

## 2020-01-30 RX ORDER — MUPIROCIN 20 MG/G
OINTMENT TOPICAL 2 TIMES DAILY
Qty: 1 TUBE | Refills: 0 | Status: SHIPPED | OUTPATIENT
Start: 2020-01-30 | End: 2020-01-30

## 2020-01-30 RX ORDER — IBUPROFEN 200 MG
600 TABLET ORAL EVERY 6 HOURS PRN
Status: DISCONTINUED | OUTPATIENT
Start: 2020-01-30 | End: 2020-01-31 | Stop reason: HOSPADM

## 2020-01-30 RX ORDER — SULFAMETHOXAZOLE AND TRIMETHOPRIM 800; 160 MG/1; MG/1
2 TABLET ORAL 2 TIMES DAILY
Status: DISCONTINUED | OUTPATIENT
Start: 2020-01-30 | End: 2020-01-31 | Stop reason: HOSPADM

## 2020-01-30 RX ORDER — MUPIROCIN 20 MG/G
OINTMENT TOPICAL 2 TIMES DAILY
Qty: 1 TUBE | Refills: 0 | Status: SHIPPED | OUTPATIENT
Start: 2020-01-30 | End: 2020-01-31

## 2020-01-30 RX ADMIN — BACLOFEN 5 MG: 10 TABLET ORAL at 01:01

## 2020-01-30 RX ADMIN — HEPARIN SODIUM 5000 UNITS: 5000 INJECTION INTRAVENOUS; SUBCUTANEOUS at 05:01

## 2020-01-30 RX ADMIN — BUPRENORPHINE HYDROCHLORIDE AND NALOXONE HYDROCHLORIDE DIHYDRATE 1 TABLET: 2; .5 TABLET SUBLINGUAL at 10:01

## 2020-01-30 RX ADMIN — BACLOFEN 5 MG: 10 TABLET ORAL at 09:01

## 2020-01-30 RX ADMIN — DIVALPROEX SODIUM 500 MG: 250 TABLET, DELAYED RELEASE ORAL at 09:01

## 2020-01-30 RX ADMIN — IBUPROFEN 600 MG: 200 TABLET, FILM COATED ORAL at 06:01

## 2020-01-30 RX ADMIN — MUPIROCIN: 20 OINTMENT TOPICAL at 10:01

## 2020-01-30 RX ADMIN — ASPIRIN 325 MG ORAL TABLET 325 MG: 325 PILL ORAL at 09:01

## 2020-01-30 RX ADMIN — THERA TABS 1 TABLET: TAB at 09:01

## 2020-01-30 RX ADMIN — FOLIC ACID 1 MG: 1 TABLET ORAL at 09:01

## 2020-01-30 RX ADMIN — BUPROPION HYDROCHLORIDE 300 MG: 300 TABLET, FILM COATED, EXTENDED RELEASE ORAL at 09:01

## 2020-01-30 RX ADMIN — ATORVASTATIN CALCIUM 10 MG: 10 TABLET, FILM COATED ORAL at 09:01

## 2020-01-30 RX ADMIN — MUPIROCIN: 20 OINTMENT TOPICAL at 09:01

## 2020-01-30 RX ADMIN — PREGABALIN 300 MG: 150 CAPSULE ORAL at 09:01

## 2020-01-30 RX ADMIN — HEPARIN SODIUM 5000 UNITS: 5000 INJECTION INTRAVENOUS; SUBCUTANEOUS at 10:01

## 2020-01-30 RX ADMIN — DULOXETINE HYDROCHLORIDE 60 MG: 30 CAPSULE, DELAYED RELEASE ORAL at 09:01

## 2020-01-30 RX ADMIN — SULFAMETHOXAZOLE AND TRIMETHOPRIM 2 TABLET: 800; 160 TABLET ORAL at 01:01

## 2020-01-30 RX ADMIN — IBUPROFEN 400 MG: 400 TABLET, FILM COATED ORAL at 09:01

## 2020-01-30 RX ADMIN — ACETAMINOPHEN 500 MG: 500 TABLET ORAL at 09:01

## 2020-01-30 RX ADMIN — HEPARIN SODIUM 5000 UNITS: 5000 INJECTION INTRAVENOUS; SUBCUTANEOUS at 01:01

## 2020-01-30 RX ADMIN — TRAZODONE HYDROCHLORIDE 25 MG: 50 TABLET ORAL at 09:01

## 2020-01-30 RX ADMIN — Medication 100 MG: at 09:01

## 2020-01-30 RX ADMIN — SULFAMETHOXAZOLE AND TRIMETHOPRIM 2 TABLET: 800; 160 TABLET ORAL at 09:01

## 2020-01-30 NOTE — ASSESSMENT & PLAN NOTE
44 y.o. man with medical history of HTN, fibromyalgia, alcohol abuse, suicidal ideation, elevated liver enzymes and bipolar disorder who presented to ED intoxicated. CT ordered in ED due to complaints of HA. CT with R parietal infarct. Admitted patient to stroke unit for further workup.     MRI Brain confirmed R posterior MCA infarct, as well as LILIANA globus pallidus (basal ganglia) disease, a pattern often seen with opioid use. TTE with large PFO vs small ASD. Stroke etiology of unclear source but concerns of paradoxical embolus present due to cardiac shunt on echo. Will refer pt to Dr. Damico for closure. Alternative etiologies would relate to drug use; regardless of etiology, cessation of use is strongly encouraged.    See psych sections below. Patient to be discharged home. Strongly encouraged patient to seek inpatient rehab facility following hospital discharge.      Antithrombotics for secondary stroke prevention: Antiplatelets: Aspirin: 325 mg daily  Statins for secondary stroke prevention and hyperlipidemia, if present:   Statins: Atorvastatin- 10 mg daily. LFTs improved, starting lower dose stain due to history of alcoholism.  Aggressive risk factor modification: HTN, Smoking, alcohol and drug use  Rehab efforts: The patient has been evaluated by a stroke team provider and the therapy needs have been fully considered based off the presenting complaints and exam findings. The following therapy evaluations are needed: PT evaluate and treat, OT evaluate and treat, SLP evaluate and treat - [facility]  Diagnostics ordered/pending: None   VTE prophylaxis: Heparin 5000 units SQ every 8 hours, place SCDs  BP parameters: Infarct: No intervention, SBP < 180

## 2020-01-30 NOTE — ASSESSMENT & PLAN NOTE
Patient given multiple medications by ED staff for headache including Fioricet, dexamethasone, benadryl, remeron, and ketorolac.  D/c'd fioricet. Started Depakote (increased dose 1/27), Tylenol PRN.  Continuing valium taper and suboxone as well.    Scalp wound noted in area of head pain, no drainage, consulted dermatology: bacterial culture ordered. Mupirocin ordered BID. Culture resulted with MRSA - Bactrim DS 2 tabs BID X 7 days started 1/30  Continue Ibuprofen Q6 for headache

## 2020-01-30 NOTE — ASSESSMENT & PLAN NOTE
Pt reported presented to the ED intoxicated.  Monitoring for signs and symptoms of withdrawal per [facility]WA protocol.  Banana bag administered. Thiamine, MTVN, folic acid in place.    Psychiatry consulted, appreciate recs  Continuing Valium taper per psych recs (too high risk to stop abruptly without gradual wean). Taper completed 1/29.  Counseled pt on cessations. He initially expressed interest in possible inpatint rehab resources. At this time plans to discharge home and patient reports he will pursue inpatient rehab. List of facilities provided by addiction psych and CM/IKER.

## 2020-01-30 NOTE — PROGRESS NOTES
[facility]  Vascular Neurology  Comprehensive Stroke Center  Progress Note    Assessment/Plan:     * Embolic stroke involving right middle cerebral artery  44 y.o. man with medical history of HTN, fibromyalgia, alcohol abuse, suicidal ideation, elevated liver enzymes and bipolar disorder who presented to ED intoxicated. CT ordered in ED due to complaints of HA. CT with R parietal infarct. Admitted patient to stroke unit for further workup.     MRI Brain confirmed R posterior MCA infarct, as well as LILIANA globus pallidus (basal ganglia) disease, a pattern often seen with opioid use. TTE with large PFO vs small ASD. Stroke etiology of unclear source but concerns of paradoxical embolus present due to cardiac shunt on echo. Will refer pt to Dr. Damico for closure. Alternative etiologies would relate to drug use; regardless of etiology, cessation of use is strongly encouraged.    See psych sections below. Patient to be discharged home. Strongly encouraged patient to seek inpatient rehab facility following hospital discharge.      Antithrombotics for secondary stroke prevention: Antiplatelets: Aspirin: 325 mg daily  Statins for secondary stroke prevention and hyperlipidemia, if present:   Statins: Atorvastatin- 10 mg daily. LFTs improved, starting lower dose stain due to history of alcoholism.  Aggressive risk factor modification: HTN, Smoking, alcohol and drug use  Rehab efforts: The patient has been evaluated by a stroke team provider and the therapy needs have been fully considered based off the presenting complaints and exam findings. The following therapy evaluations are needed: PT evaluate and treat, OT evaluate and treat, SLP evaluate and treat - [facility]  Diagnostics ordered/pending: None   VTE prophylaxis: Heparin 5000 units SQ every 8 hours, place SCDs  BP parameters: Infarct: No intervention, SBP < 180    Infection of wound due to methicillin resistant Staphylococcus aureus  (MRSA)  Scalp wound cultured resulting in MRSA - Bactrim DS 2 tabs BID X 7 days ordered    Per patient and wife - patient with hx of MRSA. Per care everywhere patient treated in 5/2018 for abscess under the arm. Given Bactrim DS X 7 days. Per patient, physician did I&D on abscess. Patient is concerned PO Bactrim will not treat current infection due to family members recently with MRSA infection that did not respond to PO Bactrim and required IV Vanc    ID consulted - discussed with on call consult team; team will see patient in the morning      Right to left cardiac shunt  TTE with R to L shunt, PFO vs ASD  Stroke risk factor; Possible source of stroke  US BLE no evidence of DVT.  Will need outpatient follow up with Dr. Damico for possible closure.    Opioid use disorder, severe, on maintenance therapy, dependence  Stroke risk factor  Patient reports snorting small amount of drugs night prior to presentation while he believes was heroine. Tox screen positive for barbiturates and opiates  History of psychiatric hospitalization and alcohol/drug rehab in 2018.   with 71 prescriptions, 18 prescribers, and 15 pharmacies.    Addiction Psychiatry consulted, Appreciate assistance.  - Pt on Zubsolv 5.7-1.4 mg TID at home. Discussed with psych resident; ordered 8-2mg BID regimen inpatient. Now pursuing Suboxone taper (completed 1/30).     - Psych discouraging against abrupt discontinuation of benzos due to high risk for further severe withdrawal syndromes. Pt was on Valium 10mg TID yesterday - switched to Valium 5mg TID today. Per psych, likely ok with 5mg BID tomorrow 1/28, etc. Taper completed 1/29  - Pt's psychiatrist is Preeti Logan, though [facility] [place] [place] psych team is not sure if pt was following as closely as he needed; Team contacted Dr. Anderson and provider no longer will follow patient if he is not seeking sobriety. Suboxone tapered off. Per psych they Discussed that he will need to f/u with a doctor outpt to get  this medication. He expressed understanding. Addiction psych resource sheet with clinic, IOP programs, and residential rehab options given to patient.    Our team discussed with patient the importance of drug cessation at discharge and risk if patient continues to use. At this time patient denies wanting to go to an inpatient facility. It was strongly encouraged patient enroll in a inpatient facility after he leaves the hospital. He reports he is considering it.     Per addiction psych medications continuing at discharge: Cymbalta 60 mg, Wellbutrin  mg, Remeron 15 mg qhs, and Depakote 500 mg BID    Essential hypertension  Stroke risk factor  Patient on amlodipine at home but holding home BP meds acutely.  SBP < 180  BP currently at goal    Cytotoxic cerebral edema  Area of cytotoxic cerebral edema identified when reviewing brain imaging in the territory of the R middle cerebral artery. There is mass effect associated with it. We will continue to monitor the patients clinical exam for any worsening of symptoms which may indicate expansion of the stroke or the area of the edema resulting in the clinical change. The pattern is suggestive of ESUS etiology.    Cephalalgia  Patient given multiple medications by ED staff for headache including Fioricet, dexamethasone, benadryl, remeron, and ketorolac.  D/c'd fioricet. Started Depakote (increased dose 1/27), Tylenol PRN.  Continuing valium taper and suboxone as well.    Scalp wound noted in area of head pain, no drainage, consulted dermatology: bacterial culture ordered. Mupirocin ordered BID. Culture resulted with MRSA - Bactrim DS 2 tabs BID X 7 days started 1/30  Continue Ibuprofen Q6 for headache     Elevated liver enzymes  History of transaminitis  Elevated liver enzymes on admission but trended down.  Starting low-dose Atorvastatin    Tobacco use disorder  Stroke risk factor  Consider nicotine patch if necessary   pt on importance of cessation for  secondary stroke prevention prior to discharge.    Major depressive disorder  Continue Cymbalta 60 mg daily     Suicidal ideations  History of suicidal ideations  No evidence thus far currently but will monitor   Previous provider called pt's pharmacy and re-ordered home psychiatric medications.    Alcohol use disorder, severe, dependence  Pt reported presented to the ED intoxicated.  Monitoring for signs and symptoms of withdrawal per [facility] protocol.  Banana bag administered. Thiamine, MTVN, folic acid in place.    Psychiatry consulted, appreciate recs  Continuing Valium taper per psych recs (too high risk to stop abruptly without gradual wean). Taper completed 1/29.  Counseled pt on cessations. He initially expressed interest in possible inpatint rehab resources. At this time plans to discharge home and patient reports he will pursue inpatient rehab. List of facilities provided by addiction psych and ANDRIY/IKER.    Generalized anxiety disorder  Pt's pharmacy called and home meds initially continued.   Per psych team 1/27, suspect Zyprexa was not a recent home med but that patient had stopped it.  Depakote previous started for mood stabalizing & HA benefits. Per Psych, could d/c Zyprexa and increase Depakote to simplify psych regimen as well as continue to improve pt's report of HAs.    Pt now appears more comfortable, less anxious. Will continue monitoring.  Currently on Depakote 500 mg BID - will continue outpatient        1/25 started on diazepam for possible withdrawal, continuing to complain of headache, psychiatry consulted   1/26 continuing valium taper, started suboxone yesterday due to persistent headache,   1/27: Increased Suboxone and continuing Valium taper per addiction psych assistance. D/c'd Zyprexa and increased Depakote. Troponin with downtrend.  1/28: wife at bedside overnight, night team discussed questions/concerns. Head wound on scalp without drainage, ordered ice packs and bactroban, consulted  dermatology. Will attempt discharge today but want to ensure suboxone follow up. Patient completed 21 days at a rehab earlier this month but did not complete rehab due to insurance coverage. When he was admitted to rehab facility, he reports that facility discarded his suboxone per their protocol. When he left the facility, he did not have suboxone. Plans to discuss patient further with psychiatry and contact patient's wife  1/29 - Continuing Valium and Suboxone taper. Discussed with patient regarding [facility] - at this time does not want to go to that location. He would think about going to another rehab facility. CM/SW gathering information regarding other facilities. Bacterial culture pending.   1/30 - Suboxone taper ending today. Patient reports feeling okay today. Continuing Ibuprofen for HA. Would culture with MRSA - sensitive to Bactrim. Planned for Bactrim DS 2 tabs BID X 7 days. Wife with concern that Bactrim will not adequately treat MRSA due to PO Bactrim not being an adequate treatment for herself and family members in the past ultimately leading to hospitalization and IV Vanc treatment. ID consulted - will see patient in the AM.      STROKE DOCUMENTATION        NIH Scale:  1a. Level of Consciousness: 0-->Alert, keenly responsive  1b. LOC Questions: 0-->Answers both questions correctly  1c. LOC Commands: 0-->Performs both tasks correctly  2. Best Gaze: 0-->Normal  3. Visual: 1-->Partial hemianopia  4. Facial Palsy: 1-->Minor paralysis (flattened nasolabial fold, asymmetry on smiling)  5a. Motor Arm, Left: 0-->No drift, limb holds 90 (or 45) degrees for full 10 secs  5b. Motor Arm, Right: 0-->No drift, limb holds 90 (or 45) degrees for full 10 secs  6a. Motor Leg, Left: 0-->No drift, leg holds 30 degree position for full 5 secs  6b. Motor Leg, Right: 0-->No drift, leg holds 30 degree position for full 5 secs  7. Limb Ataxia: 0-->Absent  8. Sensory: 1-->Mild-to-moderate sensory loss, patient  feels pinprick is less sharp or is dull on the affected side, or there is a loss of superficial pain with pinprick, but patient is aware of being touched  9. Best Language: 0-->No aphasia, normal  10. Dysarthria: 0-->Normal  11. Extinction and Inattention (formerly Neglect): 0-->No abnormality  Total (NIH Stroke Scale): 3       Modified Newberry Score: 0  Dwight Coma Scale:    ABCD2 Score:    [id number] Score:   HAS -BLED Score:   ICH Score:   Hunt & Godwin Classification:      Hemorrhagic change of an Ischemic Stroke: Does this patient have an ischemic stroke with hemorrhagic changes? No     Neurologic Chief Complaint: Headache    Subjective:     Interval History: Patient is seen for follow-up neurological assessment and treatment recommendations:     Suboxone taper ending today. Patient reports feeling okay today. Continuing Ibuprofen for HA. Would culture with MRSA - sensitive to Bactrim. Planned for Bactrim DS 2 tabs BID X 7 days. Wife with concern that Bactrim will not adequately treat MRSA due to PO Bactrim not being an adequate treatment for herself and family members in the past ultimately leading to hospitalization and IV Vanc treatment. ID consulted - will see patient in the AM.      HPI, Past Medical, Family, and Social History remains the same as documented in the initial encounter.     Review of Systems   Constitutional: Negative for chills and fever.   Eyes: Positive for visual disturbance. Negative for discharge.   Gastrointestinal: Negative for nausea and vomiting.   Skin: Positive for wound (scalp).   Neurological: Positive for facial asymmetry, numbness and headaches. Negative for speech difficulty and weakness.   Psychiatric/Behavioral: Negative for agitation and decreased concentration.     Scheduled Meds:   aspirin  325 mg Oral Daily    atorvastatin  10 mg Oral Daily    buPROPion  300 mg Oral Daily    cloNIDine 0.1 mg/24 hr td ptwk  1 patch Transdermal Q7 Days    divalproex  500 mg Oral Q12H     DULoxetine  60 mg Oral Daily    folic acid  1 mg Oral Daily    heparin (porcine)  5,000 Units Subcutaneous Q8H    multivitamin  1 tablet Oral Daily    mupirocin   Topical (Top) BID    pregabalin  300 mg Oral BID    sulfamethoxazole-trimethoprim 800-160mg  2 tablet Oral BID    thiamine  100 mg Oral Daily    traZODone  25 mg Oral QHS     Continuous Infusions:   sodium chloride 0.9%       PRN Meds:acetaminophen, baclofen, ibuprofen, labetalol, lorazepam, sodium chloride 0.9%, sodium chloride 0.9%    Objective:     Vital Signs (Most Recent):  Temp: 97.9 °F (36.6 °C) (01/30/20 1047)  Pulse: 93 (01/30/20 1047)  Resp: 17 (01/30/20 1047)  BP: (!) 142/93 (01/30/20 1047)  SpO2: 98 % (01/30/20 1047)  BP Location: Right arm    Vital Signs Range (Last 24H):  Temp:  [96.1 °F (35.6 °C)-98.3 °F (36.8 °C)]   Pulse:  [59-93]   Resp:  [17-18]   BP: (131-142)/(74-93)   SpO2:  [93 %-98 %]   BP Location: Right arm    Physical Exam   Constitutional: He is oriented to person, place, and time. He appears well-developed and well-nourished. No distress.   HENT:   Head: Normocephalic and atraumatic.   Eyes: EOM are normal.   Cardiovascular: Normal rate.   Pulmonary/Chest: Effort normal. No respiratory distress.   Musculoskeletal: Normal range of motion.   Neurological: He is alert and oriented to person, place, and time.   Skin: Skin is warm and dry.   Small 2 cm scalp wound with surrounding edema     Psychiatric: Cognition and memory are not impaired. He is attentive.   Vitals reviewed.        Neurological Exam:   LOC: alert  Attention Span: Good   Language: No aphasia  Articulation: No dysarthria  Orientation: Person, Place, Time   Visual Fields: Full  EOM (CN III, IV, VI): L upper quadrantanopsia  Facial Movement (CN VII): Lower facial weakness on the Left  Motor: Arm left  Normal 5/5  Leg left  Normal 5/5  Arm right  Normal 5/5  Leg right Normal 5/5  Cebellar: No evidence of appendicular or axial ataxia  Sensation:  Matthew-hypoesthesia left (improved)   Tone: Normal tone throughout    Laboratory:  CMP:   Recent Labs   Lab 01/30/20  0344   CALCIUM 9.2   ALBUMIN 3.6   PROT 6.7      K 4.0   CO2 28   CL 99   BUN 13   CREATININE 0.9   ALKPHOS 77   ALT 28   AST 19   BILITOT 0.3     CBC:   Recent Labs   Lab 01/30/20  0344   WBC 6.08   RBC 4.22*   HGB 13.5*   HCT 39.1*      MCV 93   MCH 32.0*   MCHC 34.5     Lipid Panel:   Recent Labs   Lab 01/24/20  1127   CHOL 128   LDLCALC 54.0*   HDL 53   TRIG 105     Coagulation:   Recent Labs   Lab 01/25/20  0313   INR 1.0   APTT 29.3     Platelet Aggregation Study: No results for input(s): PLTAGG, PLTAGINTERP, PLTAGREGLACO, ADPPLTAGGREG in the last 168 hours.  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    CT Head. Date: 01/25/20  Moderate size region of parenchymal hypoattenuation and sulcal effacement involving the right parietooccipital region suggestive of evolving recent infarct without evidence of hemorrhagic conversion.  Additional, punctate hypodensities are present within the bilateral basal ganglia which appear more conspicuous from prior examination and additional regions of acute ischemia are not excluded.   No evidence of midline shift or hydrocephalus.    CT Head. Date: 01/24/20  Mild moderate hypoattenuation with sulcal effacement right superior occipital/inferior parietal lobes peripherally concerning for recent infarction though indeterminate. Clinical correlation and further evaluation with MRI recommended. There is no evidence for hydrocephalus or acute intracranial hemorrhage.    Vessel Imaging     CTA head and neck. Date: 01/25/20  No evidence of high-grade stenosis or major vascular occlusion.  Evolving subtotal right MCA  infarct.  Evolving acute bilateral basal ganglia lacunar infarcts  Age advanced cerebral volume loss.    MRA Neck WO Contrast Date: 01/25/20  Technically very limited study.  Abnormal right carotid bulb, artifact versus stenosis.  Correlate with carotid Doppler.      Cardiac Imaging     TTE with bubble contrast. Date: 01/24/20  · Concentric left ventricular remodeling.  · Normal left ventricular systolic function. The estimated ejection fraction is 55%.  · The right ventricle is at the upper limit of normal in size with normal right ventricular systolic function.  · Normal LV diastolic function.  · Normal central venous pressure (3 mmHg).  · With agitated saline administration, there is evidence of considerable right to left shunting consistent with a large PFO or small ASD.      Jacklyn Fritz, CAROLYN  [facility]  Department of Vascular Neurology   [facility]

## 2020-01-30 NOTE — PLAN OF CARE
IKER Coronel met with Pt at bedside to provide list of treatment options per request yesterday. Addressed questions about the options. Pt reported he will review and may call around to some of the options.     Berna Flood LMSW

## 2020-01-30 NOTE — ASSESSMENT & PLAN NOTE
Pt's pharmacy called and home meds initially continued.   Per psych team 1/27, suspect Zyprexa was not a recent home med but that patient had stopped it.  Depakote previous started for mood stabalizing & HA benefits. Per Psych, could d/c Zyprexa and increase Depakote to simplify psych regimen as well as continue to improve pt's report of HAs.    Pt now appears more comfortable, less anxious. Will continue monitoring.  Currently on Depakote 500 mg BID - will continue outpatient

## 2020-01-30 NOTE — ASSESSMENT & PLAN NOTE
- Pt reports hx of bipolar (II?) disorder, although unclear at this time if this is due to substance use vs true mood disorder.  Currently prescribed Lamictal, Wellbutrin, and Remeron as an outpatient  - Agree with primary team's plan to use Depakote instead of Lamictal or Zyprexa as Depakote can be helpful for both mood stabilization and headache prophylaxis. Continue Depakote 500 mg BID  - Can continue home Wellbutrin  mg daily  - Can continue Cymbalta 60 mg daily. Can be helpful for both mood and chronic pain  - Would discontinue Trazodone and restart home Remeron 15 mg qhs for sleep and mood

## 2020-01-30 NOTE — PLAN OF CARE
Problem: Cerebral Tissue Perfusion Risk (Stroke, Ischemic/Transient Ischemic Attack)  Goal: Optimal Cerebral Tissue Perfusion  Outcome: Ongoing, Progressing     Problem: Functional Ability Impaired (Stroke, Ischemic/Transient Ischemic Attack)  Goal: Optimal Functional Ability  Outcome: Ongoing, Progressing   Patient and caregiver instructed on preffusion pressure and functional impairment related to CVA.    Patient and caregiver verbalized.  Will cont POC and treatment

## 2020-01-30 NOTE — ASSESSMENT & PLAN NOTE
- Need to taper off Suboxone as pt has no way to get buprenorphine outpt currently, and did not have supply at home. Suboxone 2 mg once this morning, then stop.  - Resource sheet provided to pt as noted above    - Opiate withdrawal PRNs for comfort may include the following:  Bentyl 20 mg q6h PRN stomach cramps  Vistaril 50 mg TID PRN anxiety, insomnia, or itching  Ibuprofen 600 mg q6h PRN pain  Imodium 2 mg QID PRN diarrhea  Robaxin 500 mg QID PRN muscle spasms  Zofran 8 mg q8h PRN nausea  Clonidine 0.1 mg PO q4h PRN opiate withdrawal (hold Clonidine for HR<60, Systolic<90, Diastolic<60). PO tablets tend to work better than patches for opiates withdrawal symptoms  Afrin 0.05% nasal spray, 2 sprays BID PRN congestion for 3 days

## 2020-01-30 NOTE — ASSESSMENT & PLAN NOTE
Stroke risk factor  Patient reports snorting small amount of drugs night prior to presentation while he believes was heroine. Tox screen positive for barbiturates and opiates  History of psychiatric hospitalization and alcohol/drug rehab in 2018.   with 71 prescriptions, 18 prescribers, and 15 pharmacies.    Addiction Psychiatry consulted, Appreciate assistance.  - Pt on Zubsolv 5.7-1.4 mg TID at home. Discussed with psych resident; ordered 8-2mg BID regimen inpatient. Now pursuing Suboxone taper (completed 1/30).     - Psych discouraging against abrupt discontinuation of benzos due to high risk for further severe withdrawal syndromes. Pt was on Valium 10mg TID yesterday - switched to Valium 5mg TID today. Per psych, likely ok with 5mg BID tomorrow 1/28, etc. Taper completed 1/29  - Pt's psychiatrist is Preeti Logan, though [facility] psych team is not sure if pt was following as closely as he needed; Team contacted Dr. Anderson and provider no longer will follow patient if he is not seeking sobriety. Suboxone tapered off. Per psych they Discussed that he will need to f/u with a doctor outpt to get this medication. He expressed understanding. Addiction psych resource sheet with clinic, IOP programs, and residential rehab options given to patient.    Our team discussed with patient the importance of drug cessation at discharge and risk if patient continues to use. At this time patient denies wanting to go to an inpatient facility. It was strongly encouraged patient enroll in a inpatient facility after he leaves the hospital. He reports he is considering it.     Per addiction psych medications continuing at discharge: Cymbalta 60 mg, Wellbutrin  mg, Remeron 15 mg qhs, and Depakote 500 mg BID

## 2020-01-30 NOTE — SUBJECTIVE & OBJECTIVE
Neurologic Chief Complaint: Headache    Subjective:     Interval History: Patient is seen for follow-up neurological assessment and treatment recommendations:     Suboxone taper ending today. Patient reports feeling okay today. Continuing Ibuprofen for HA. Would culture with MRSA - sensitive to Bactrim. Planned for Bactrim DS 2 tabs BID X 7 days. Wife with concern that Bactrim will not adequately treat MRSA due to PO Bactrim not being an adequate treatment for herself and family members in the past ultimately leading to hospitalization and IV Vanc treatment. ID consulted - will see patient in the AM.      HPI, Past Medical, Family, and Social History remains the same as documented in the initial encounter.     Review of Systems   Constitutional: Negative for chills and fever.   Eyes: Positive for visual disturbance. Negative for discharge.   Gastrointestinal: Negative for nausea and vomiting.   Skin: Positive for wound (scalp).   Neurological: Positive for facial asymmetry, numbness and headaches. Negative for speech difficulty and weakness.   Psychiatric/Behavioral: Negative for agitation and decreased concentration.     Scheduled Meds:   aspirin  325 mg Oral Daily    atorvastatin  10 mg Oral Daily    buPROPion  300 mg Oral Daily    cloNIDine 0.1 mg/24 hr td ptwk  1 patch Transdermal Q7 Days    divalproex  500 mg Oral Q12H    DULoxetine  60 mg Oral Daily    folic acid  1 mg Oral Daily    heparin (porcine)  5,000 Units Subcutaneous Q8H    multivitamin  1 tablet Oral Daily    mupirocin   Topical (Top) BID    pregabalin  300 mg Oral BID    sulfamethoxazole-trimethoprim 800-160mg  2 tablet Oral BID    thiamine  100 mg Oral Daily    traZODone  25 mg Oral QHS     Continuous Infusions:   sodium chloride 0.9%       PRN Meds:acetaminophen, baclofen, ibuprofen, labetalol, lorazepam, sodium chloride 0.9%, sodium chloride 0.9%    Objective:     Vital Signs (Most Recent):  Temp: 97.9 °F (36.6 °C) (01/30/20  1047)  Pulse: 93 (01/30/20 1047)  Resp: 17 (01/30/20 1047)  BP: (!) 142/93 (01/30/20 1047)  SpO2: 98 % (01/30/20 1047)  BP Location: Right arm    Vital Signs Range (Last 24H):  Temp:  [96.1 °F (35.6 °C)-98.3 °F (36.8 °C)]   Pulse:  [59-93]   Resp:  [17-18]   BP: (131-142)/(74-93)   SpO2:  [93 %-98 %]   BP Location: Right arm    Physical Exam   Constitutional: He is oriented to person, place, and time. He appears well-developed and well-nourished. No distress.   HENT:   Head: Normocephalic and atraumatic.   Eyes: EOM are normal.   Cardiovascular: Normal rate.   Pulmonary/Chest: Effort normal. No respiratory distress.   Musculoskeletal: Normal range of motion.   Neurological: He is alert and oriented to person, place, and time.   Skin: Skin is warm and dry.   Small 2 cm scalp wound with surrounding edema     Psychiatric: Cognition and memory are not impaired. He is attentive.   Vitals reviewed.        Neurological Exam:   LOC: alert  Attention Span: Good   Language: No aphasia  Articulation: No dysarthria  Orientation: Person, Place, Time   Visual Fields: Full  EOM (CN III, IV, VI): L upper quadrantanopsia  Facial Movement (CN VII): Lower facial weakness on the Left  Motor: Arm left  Normal 5/5  Leg left  Normal 5/5  Arm right  Normal 5/5  Leg right Normal 5/5  Cebellar: No evidence of appendicular or axial ataxia  Sensation: Matthew-hypoesthesia left (improved)   Tone: Normal tone throughout    Laboratory:  CMP:   Recent Labs   Lab 01/30/20  0344   CALCIUM 9.2   ALBUMIN 3.6   PROT 6.7      K 4.0   CO2 28   CL 99   BUN 13   CREATININE 0.9   ALKPHOS 77   ALT 28   AST 19   BILITOT 0.3     CBC:   Recent Labs   Lab 01/30/20  0344   WBC 6.08   RBC 4.22*   HGB 13.5*   HCT 39.1*      MCV 93   MCH 32.0*   MCHC 34.5     Lipid Panel:   Recent Labs   Lab 01/24/20  1127   CHOL 128   LDLCALC 54.0*   HDL 53   TRIG 105     Coagulation:   Recent Labs   Lab 01/25/20  0313   INR 1.0   APTT 29.3     Platelet Aggregation Study:  No results for input(s): PLTAGG, PLTAGINTERP, PLTAGREGLACO, ADPPLTAGGREG in the last 168 hours.  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    CT Head. Date: 01/25/20  Moderate size region of parenchymal hypoattenuation and sulcal effacement involving the right parietooccipital region suggestive of evolving recent infarct without evidence of hemorrhagic conversion.  Additional, punctate hypodensities are present within the bilateral basal ganglia which appear more conspicuous from prior examination and additional regions of acute ischemia are not excluded.   No evidence of midline shift or hydrocephalus.    CT Head. Date: 01/24/20  Mild moderate hypoattenuation with sulcal effacement right superior occipital/inferior parietal lobes peripherally concerning for recent infarction though indeterminate. Clinical correlation and further evaluation with MRI recommended. There is no evidence for hydrocephalus or acute intracranial hemorrhage.    Vessel Imaging     CTA head and neck. Date: 01/25/20  No evidence of high-grade stenosis or major vascular occlusion.  Evolving subtotal right MCA infarct.  Evolving acute bilateral basal ganglia lacunar infarcts  Age advanced cerebral volume loss.    MRA Neck WO Contrast Date: 01/25/20  Technically very limited study.  Abnormal right carotid bulb, artifact versus stenosis.  Correlate with carotid Doppler.      Cardiac Imaging     TTE with bubble contrast. Date: 01/24/20  · Concentric left ventricular remodeling.  · Normal left ventricular systolic function. The estimated ejection fraction is 55%.  · The right ventricle is at the upper limit of normal in size with normal right ventricular systolic function.  · Normal LV diastolic  function.  · Normal central venous pressure (3 mmHg).  · With agitated saline administration, there is evidence of considerable right to left shunting consistent with a large PFO or small ASD.

## 2020-01-30 NOTE — ASSESSMENT & PLAN NOTE
Scalp wound cultured resulting in MRSA - Bactrim DS 2 tabs BID X 7 days ordered    Per patient and wife - patient with hx of MRSA. Per care everywhere patient treated in 5/2018 for abscess under the arm. Given Bactrim DS X 7 days. Per patient, physician did I&D on abscess. Patient is concerned PO Bactrim will not treat current infection due to family members recently with MRSA infection that did not respond to PO Bactrim and required IV Vanc    ID consulted - discussed with on call consult team; team will see patient in the morning

## 2020-01-30 NOTE — SUBJECTIVE & OBJECTIVE
"Interval History: see hospital course    Family History     Problem Relation (Age of Onset)    Benign prostatic hyperplasia Father    Breast cancer Mother (53)    Down syndrome Daughter    Hashimoto's thyroiditis Sister    Hypertension Father    Irritable bowel syndrome Sister    No Known Problems Sister        Tobacco Use    Smoking status: Current Every Day Smoker     Packs/day: 0.05     Types: Cigarettes    Smokeless tobacco: Never Used    Tobacco comment: Currently uses nicorette gum and lozenges.   Substance and Sexual Activity    Alcohol use: No    Drug use: No    Sexual activity: Yes     Partners: Female     Birth control/protection: None     Psychotherapeutics (From admission, onward)    Start     Stop Route Frequency Ordered    01/29/20 2100  trazodone split tablet 25 mg      -- Oral Nightly 01/29/20 1542    01/27/20 1633  lorazepam injection 2 mg      -- IV Every hour PRN 01/27/20 1637    01/25/20 0900  buPROPion 24 hr tablet 300 mg      -- Oral Daily 01/24/20 1855 01/25/20 0900  DULoxetine DR capsule 60 mg      -- Oral Daily 01/24/20 1855           Review of Systems   Constitutional: Negative for diaphoresis.   Gastrointestinal: Negative for vomiting.   Skin: Positive for wound (back of head).   Neurological: Positive for headaches. Negative for tremors and seizures.   Psychiatric/Behavioral: Negative for agitation, confusion, dysphoric mood, hallucinations, sleep disturbance and suicidal ideas. The patient is nervous/anxious.      Objective:     Vital Signs (Most Recent):  Temp: 97.8 °F (36.6 °C) (01/29/20 2328)  Pulse: 65 (01/30/20 0655)  Resp: 18 (01/29/20 2328)  BP: (!) 142/74 (01/29/20 2328)  SpO2: (!) 94 % (01/29/20 2328) Vital Signs (24h Range):  Temp:  [96.1 °F (35.6 °C)-98.6 °F (37 °C)] 97.8 °F (36.6 °C)  Pulse:  [59-84] 65  Resp:  [17-18] 18  SpO2:  [93 %-96 %] 94 %  BP: (131-142)/(74-92) 142/74     Height: 5' 8" (172.7 cm)  Weight: 81.6 kg (180 lb)  Body mass index is 27.37 " "kg/m².    No intake or output data in the 24 hours ending 01/30/20 0929    Physical Exam   Constitutional: He appears well-developed and well-nourished. No distress.   Appears mildly uncomfortable   Neurological:   No tremors   Skin: He is not diaphoretic.   Psychiatric:   Mental Status Exam:  Appearance: NAD, good hygiene and grooming, dressed in hospital gown, average weight  Behavior/Cooperation: calm, cooperative  Speech: normal rate/tone/volume  Language: english  Mood: "okay"  Affect: constricted, mood-congruent  Thought Process: goal-directed  Associations: no ADRIÁN  Thought Content: denies SI/HI/AVH  Sensorium: alert, awake   Orientation: person, place, month, year, situation  Memory: remote intact, recent impaired  Attention/Concentration: intact  Fund of Knowledge: intact   Abstraction: intact  Insight: fair   Judgement: fair    Nursing note and vitals reviewed.         Significant Labs:   Last 24 Hours:   Recent Lab Results       01/30/20  0344        Albumin 3.6     Alkaline Phosphatase 77     ALT 28     Anion Gap 12     AST 19     Baso # 0.04     Basophil% 0.7     BILIRUBIN TOTAL 0.3  Comment:  For infants and newborns, interpretation of results should be based  on gestational age, weight and in agreement with clinical  observations.  Premature Infant recommended reference ranges:  Up to 24 hours.............<8.0 mg/dL  Up to 48 hours............<12.0 mg/dL  3-5 days..................<15.0 mg/dL  6-29 days.................<15.0 mg/dL       BUN, Bld 13     Calcium 9.2     Chloride 99     CO2 28     Creatinine 0.9     Differential Method Automated     eGFR if African American >60.0     eGFR if non  >60.0  Comment:  Calculation used to obtain the estimated glomerular filtration  rate (eGFR) is the CKD-EPI equation.        Eos # 0.2     Eosinophil% 2.8     Glucose 95     Gran # (ANC) 3.3     Gran% 54.6     Hematocrit 39.1     Hemoglobin 13.5     Immature Grans (Abs) 0.05  Comment:  Mild " elevation in immature granulocytes is non specific and   can be seen in a variety of conditions including stress response,   acute inflammation, trauma and pregnancy. Correlation with other   laboratory and clinical findings is essential.       Immature Granulocytes 0.8     Lymph # 1.9     Lymph% 30.9     MCH 32.0     MCHC 34.5     MCV 93     Mono # 0.6     Mono% 10.2     MPV 11.0     nRBC 0     Platelets 154     Potassium 4.0     PROTEIN TOTAL 6.7     RBC 4.22     RDW 13.1     Sodium 139     WBC 6.08           Significant Imaging: I have reviewed all pertinent imaging results/findings within the past 24 hours.
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
## 2020-01-30 NOTE — ASSESSMENT & PLAN NOTE
· Has now completed Valium taper. No ongoing s/sx of alcohol withdrawal. VSS  · Provided resource sheet for list of clinics, IOPs, and inpt rehab. Recommended to f/u with [facility], [facility] or [facility] for addiction psych f/u (has both outpatient clinic and IOP rehab). Pt declines referral to inpatient substance abuse rehab

## 2020-01-31 VITALS
SYSTOLIC BLOOD PRESSURE: 127 MMHG | DIASTOLIC BLOOD PRESSURE: 88 MMHG | RESPIRATION RATE: 18 BRPM | OXYGEN SATURATION: 94 % | HEART RATE: 74 BPM | TEMPERATURE: 99 F | BODY MASS INDEX: 27.28 KG/M2 | HEIGHT: 68 IN | WEIGHT: 180 LBS

## 2020-01-31 PROCEDURE — 25000003 PHARM REV CODE 250: Performed by: NURSE PRACTITIONER

## 2020-01-31 PROCEDURE — 99499 UNLISTED E&M SERVICE: CPT | Mod: ,,, | Performed by: PHYSICIAN ASSISTANT

## 2020-01-31 PROCEDURE — 25000003 PHARM REV CODE 250: Performed by: PHYSICIAN ASSISTANT

## 2020-01-31 PROCEDURE — 99233 SBSQ HOSP IP/OBS HIGH 50: CPT | Mod: ,,, | Performed by: PSYCHIATRY & NEUROLOGY

## 2020-01-31 PROCEDURE — 99223 PR INITIAL HOSPITAL CARE,LEVL III: ICD-10-PCS | Mod: ,,, | Performed by: INTERNAL MEDICINE

## 2020-01-31 PROCEDURE — 99499 NO LOS: ICD-10-PCS | Mod: ,,, | Performed by: PHYSICIAN ASSISTANT

## 2020-01-31 PROCEDURE — 63600175 PHARM REV CODE 636 W HCPCS: Performed by: PHYSICIAN ASSISTANT

## 2020-01-31 PROCEDURE — 99223 1ST HOSP IP/OBS HIGH 75: CPT | Mod: ,,, | Performed by: INTERNAL MEDICINE

## 2020-01-31 PROCEDURE — 92507 TX SP LANG VOICE COMM INDIV: CPT

## 2020-01-31 PROCEDURE — 99233 PR SUBSEQUENT HOSPITAL CARE,LEVL III: ICD-10-PCS | Mod: ,,, | Performed by: PSYCHIATRY & NEUROLOGY

## 2020-01-31 RX ORDER — SULFAMETHOXAZOLE AND TRIMETHOPRIM 800; 160 MG/1; MG/1
2 TABLET ORAL 2 TIMES DAILY
Qty: 56 TABLET | Refills: 0 | Status: ON HOLD | OUTPATIENT
Start: 2020-01-31 | End: 2020-02-15

## 2020-01-31 RX ORDER — DULOXETIN HYDROCHLORIDE 60 MG/1
60 CAPSULE, DELAYED RELEASE ORAL DAILY
Qty: 30 CAPSULE | Refills: 0 | Status: SHIPPED | OUTPATIENT
Start: 2020-01-31 | End: 2020-03-01

## 2020-01-31 RX ORDER — CLONIDINE 0.1 MG/24H
1 PATCH, EXTENDED RELEASE TRANSDERMAL
Qty: 4 PATCH | Refills: 0 | Status: SHIPPED | OUTPATIENT
Start: 2020-02-05 | End: 2020-03-06

## 2020-01-31 RX ORDER — BUPROPION HYDROCHLORIDE 300 MG/1
300 TABLET ORAL DAILY
Qty: 30 TABLET | Refills: 0 | Status: ON HOLD | OUTPATIENT
Start: 2020-01-31 | End: 2020-02-15 | Stop reason: HOSPADM

## 2020-01-31 RX ORDER — IBUPROFEN 600 MG/1
600 TABLET ORAL EVERY 6 HOURS PRN
Qty: 120 TABLET | Refills: 0 | Status: ON HOLD | OUTPATIENT
Start: 2020-01-31 | End: 2020-02-15 | Stop reason: HOSPADM

## 2020-01-31 RX ORDER — MIRTAZAPINE 15 MG/1
15 TABLET, FILM COATED ORAL NIGHTLY
Qty: 30 TABLET | Refills: 0 | Status: SHIPPED | OUTPATIENT
Start: 2020-01-31 | End: 2020-03-01

## 2020-01-31 RX ORDER — BACLOFEN 5 MG/1
5 TABLET ORAL 3 TIMES DAILY PRN
Qty: 20 TABLET | Refills: 0 | Status: SHIPPED | OUTPATIENT
Start: 2020-01-31 | End: 2020-03-01

## 2020-01-31 RX ORDER — ASPIRIN 325 MG
325 TABLET ORAL DAILY
Qty: 30 TABLET | Refills: 0 | Status: SHIPPED | OUTPATIENT
Start: 2020-01-31 | End: 2020-03-01

## 2020-01-31 RX ORDER — MUPIROCIN 20 MG/G
OINTMENT TOPICAL 2 TIMES DAILY
Qty: 1 TUBE | Refills: 0 | Status: SHIPPED | OUTPATIENT
Start: 2020-01-31 | End: 2020-02-07

## 2020-01-31 RX ORDER — ATORVASTATIN CALCIUM 10 MG/1
10 TABLET, FILM COATED ORAL DAILY
Qty: 30 TABLET | Refills: 0 | Status: SHIPPED | OUTPATIENT
Start: 2020-01-31 | End: 2021-04-08

## 2020-01-31 RX ORDER — DIVALPROEX SODIUM 500 MG/1
500 TABLET, DELAYED RELEASE ORAL EVERY 12 HOURS
Qty: 60 TABLET | Refills: 0 | Status: SHIPPED | OUTPATIENT
Start: 2020-01-31 | End: 2021-04-08

## 2020-01-31 RX ADMIN — MUPIROCIN: 20 OINTMENT TOPICAL at 10:01

## 2020-01-31 RX ADMIN — BUPROPION HYDROCHLORIDE 300 MG: 300 TABLET, FILM COATED, EXTENDED RELEASE ORAL at 10:01

## 2020-01-31 RX ADMIN — HEPARIN SODIUM 5000 UNITS: 5000 INJECTION INTRAVENOUS; SUBCUTANEOUS at 03:01

## 2020-01-31 RX ADMIN — Medication 100 MG: at 10:01

## 2020-01-31 RX ADMIN — DIVALPROEX SODIUM 500 MG: 250 TABLET, DELAYED RELEASE ORAL at 10:01

## 2020-01-31 RX ADMIN — ASPIRIN 325 MG ORAL TABLET 325 MG: 325 PILL ORAL at 10:01

## 2020-01-31 RX ADMIN — SULFAMETHOXAZOLE AND TRIMETHOPRIM 2 TABLET: 800; 160 TABLET ORAL at 10:01

## 2020-01-31 RX ADMIN — PREGABALIN 300 MG: 150 CAPSULE ORAL at 10:01

## 2020-01-31 RX ADMIN — DULOXETINE HYDROCHLORIDE 60 MG: 30 CAPSULE, DELAYED RELEASE ORAL at 10:01

## 2020-01-31 RX ADMIN — ATORVASTATIN CALCIUM 10 MG: 10 TABLET, FILM COATED ORAL at 10:01

## 2020-01-31 RX ADMIN — IBUPROFEN 600 MG: 200 TABLET, FILM COATED ORAL at 03:01

## 2020-01-31 RX ADMIN — HEPARIN SODIUM 5000 UNITS: 5000 INJECTION INTRAVENOUS; SUBCUTANEOUS at 06:01

## 2020-01-31 RX ADMIN — BACLOFEN 5 MG: 10 TABLET ORAL at 03:01

## 2020-01-31 RX ADMIN — FOLIC ACID 1 MG: 1 TABLET ORAL at 10:01

## 2020-01-31 RX ADMIN — THERA TABS 1 TABLET: TAB at 10:01

## 2020-01-31 RX ADMIN — BACLOFEN 5 MG: 10 TABLET ORAL at 10:01

## 2020-01-31 RX ADMIN — IBUPROFEN 600 MG: 200 TABLET, FILM COATED ORAL at 06:01

## 2020-01-31 NOTE — CONSULTS
[facility]  Infectious Disease  Consult Note    Patient Name: Luke Coley  MRN: [id number]  Admission Date: 1/24/2020  Hospital Length of Stay: 7 days  Attending Physician: Troy Mock DO  Primary Care Provider: Primary Doctor No     Isolation Status: Contact    Inpatient consult to Infectious Diseases  Consult performed by: Eric Eubanks PA-C  Consult ordered by: Jacklyn Frizt NP  Reason for consult: Wound on head with MRSA        ID consult received. Chart being reviewed. Full note with recommendations to follow.    Thank you,  Eric Eubanks PA-C  [phone]

## 2020-01-31 NOTE — ASSESSMENT & PLAN NOTE
44 y.o. man with medical history of HTN, fibromyalgia, alcohol abuse, suicidal ideation, elevated liver enzymes and bipolar disorder who presented to ED intoxicated. CT ordered in ED due to complaints of HA. CT with R parietal infarct. Admitted patient to stroke unit for further workup.     MRI Brain confirmed R posterior MCA infarct, as well as LILIANA globus pallidus (basal ganglia) disease, a pattern often seen with opioid use. TTE with large PFO vs small ASD. Stroke etiology of unclear source but concerns of paradoxical embolus present due to cardiac shunt on echo. Ordered Ambulatory referral to Cardiology at discharge to consider possible closure. Alternative etiologies could relate to drug use; regardless of etiology, continuing to strongly encourage cessation, inpatient rehab admission.     ID gave recommendations. Staff discussed plan extensively with pt's wife over the phone. Ordered a 1-month supply of patient's medications for bedside delivery (except Lyrica which is provided by another medical provider), paper prescription for outpatient SLP as well. Patient is stable for discharge home.      Antithrombotics for secondary stroke prevention: Antiplatelets: Aspirin: 325 mg daily  Statins for secondary stroke prevention and hyperlipidemia, if present:   Statins: Atorvastatin- 10 mg daily. LFTs improved, starting lower dose stain due to history of alcoholism.  Aggressive risk factor modification: HTN, Smoking, alcohol and drug use  Rehab efforts: The patient has been evaluated by a stroke team provider and the therapy needs have been fully considered based off the presenting complaints and exam findings. The following therapy evaluations are needed: SLP evaluate and treat - Dispo Outpatient SLP only  Diagnostics ordered/pending: None   VTE prophylaxis: Heparin 5000 units SQ every 8 hours, place SCDs  BP parameters: Infarct: No intervention, SBP < 160; Long-term goal < 140

## 2020-01-31 NOTE — ASSESSMENT & PLAN NOTE
History of suicidal ideations  No evidence thus far currently but will monitor   Previous provider called pt's pharmacy and re-ordered home psychiatric medications. Updated medication regimen per Psych assistance.

## 2020-01-31 NOTE — PLAN OF CARE
01/31/2020      Luke Coley  [address] [id number]          Vascular Neurology Dept.  [facility] [address] [place] [address] [phone] after hours  [phone] fax Diagnosis:  Embolic stroke involving right middle cerebral artery                         Debility        Please evaluate and treat for all SLP needs. Thank you,                   __________________________  Sandra Tristan PA-C  01/31/2020

## 2020-01-31 NOTE — ASSESSMENT & PLAN NOTE
Pt reported he presented to the ED intoxicated. Initially monitoring for signs and symptoms of withdrawal per CIWA protocol. Banana bag administered and Thiamine, MTVN, folic acid ordered.    Psychiatry consulted, appreciate recs.  S/p Valium taper, completed 1/29.  Counseled pt on cessations. He initially expressed interest in possible inpatint rehab resources, though at this time, he plans to discharge home and reports he may pursue inpatient rehab after that. A list of facilities have been provided by addiction Psych and [facility].

## 2020-01-31 NOTE — ASSESSMENT & PLAN NOTE
Patient given multiple medications by ED staff for headache including Fioricet, dexamethasone, benadryl, remeron, and ketorolac.  D/c'd fioricet. Started Depakote (increased dose 1/27), Tylenol PRN.  Valium and suboxone tapers completed as well.    Scalp wound noted in reported area of head pain. No drainage noted at this time. Encouraged pt not to pick at the area as well.  Consulted Dermatology; Appreciate assistance. Bacterial culture ordered and result with MRSA - Bactrim DS 2 tabs BID x 7 days started 1/30. Mupirocin BID as well, applied to scalp as well as LILIANA nares.  Continue Ibuprofen Q6 PRN for headache as well

## 2020-01-31 NOTE — PLAN OF CARE
Patient medically ready for discharge to home.     01/31/20 1600   Final Note   Assessment Type Final Discharge Note   Anticipated Discharge Disposition Home   Hospital Follow Up  Appt(s) scheduled? Yes   Discharge plans and expectations educations in teach back method with documentation complete? No   Right Care Referral Info   Post Acute Recommendation No Care     Future Appointments   Date Time Provider Department Center   2/7/2020  2:00 PM Deepa Tyson MD [facility]  Case Management  Ext: [phone]  01/31/2020  4:00 PM
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
## 2020-01-31 NOTE — ASSESSMENT & PLAN NOTE
Stroke risk factor  SBP < 160; Long-term goal < 140  -131 over last 24 hrs. Clonidine patch had been started for withdrawal symptoms, thus did not continue home Amlodipine at discharge.  PCP follow-up

## 2020-01-31 NOTE — PROGRESS NOTES
[facility]  Vascular Neurology  Comprehensive Stroke Center  Progress Note    Assessment/Plan:     * Embolic stroke involving right middle cerebral artery  44 y.o. man with medical history of HTN, fibromyalgia, alcohol abuse, suicidal ideation, elevated liver enzymes and bipolar disorder who presented to ED intoxicated. CT ordered in ED due to complaints of HA. CT with R parietal infarct. Admitted patient to stroke unit for further workup.     MRI Brain confirmed R posterior MCA infarct, as well as LILIANA globus pallidus (basal ganglia) disease, a pattern often seen with opioid use. TTE with large PFO vs small ASD. Stroke etiology of unclear source but concerns of paradoxical embolus present due to cardiac shunt on echo. Ordered Ambulatory referral to Cardiology at discharge to consider possible closure. Alternative etiologies could relate to drug use; regardless of etiology, continuing to strongly encourage cessation, inpatient rehab admission.     ID gave recommendations. Staff discussed plan extensively with pt's wife over the phone. Ordered a 1-month supply of patient's medications for bedside delivery (except Lyrica which is provided by another medical provider), paper prescription for outpatient SLP as well. Patient is stable for discharge home.      Antithrombotics for secondary stroke prevention: Antiplatelets: Aspirin: 325 mg daily  Statins for secondary stroke prevention and hyperlipidemia, if present:   Statins: Atorvastatin- 10 mg daily. LFTs improved, starting lower dose stain due to history of alcoholism.  Aggressive risk factor modification: HTN, Smoking, alcohol and drug use  Rehab efforts: The patient has been evaluated by a stroke team provider and the therapy needs have been fully considered based off the presenting complaints and exam findings. The following therapy evaluations are needed: SLP evaluate and treat - Dispo Outpatient SLP only  Diagnostics ordered/pending: None    VTE prophylaxis: Heparin 5000 units SQ every 8 hours, place [facility]  BP parameters: Infarct: No intervention, SBP < 160; Long-term goal < 140    Infection of wound due to methicillin resistant Staphylococcus aureus (MRSA)  Scalp wound cultured, results showing MRSA - Bactrim DS 2 tabs BID x 7 days ordered.  Per patient and wife - patient with hx of MRSA. Per Care Everywhere, patient treated in 5/2018 for abscess under the arm and had been given Bactrim DS x 7 days. Per patient, physician did I&D on abscess. Patient is concerned PO Bactrim will not treat current infection due to family members recently with MRSA infection that did not respond to PO Bactrim and required IV Vanc.    ID consulted; Appreciate assistance. Recommend continuing Bactrim and Mupirocin ointment on scalp and nares. Based on wound culture sensitivities and in the setting of patient's young age and good kidney function, PO Bactrim should be as effective as IV Vanc therapy in this patient, and with lower associated risks.  ID educated patient on staph decolonization protocol to use for home. Patient can follow up for his PCP for this wound; signed off.  Staff discussed the above and plans extensively with pt's wife over the phone.     Opioid use disorder, severe, on maintenance therapy, dependence  Stroke risk factor  Patient reports snorting small amount of drugs night prior to presentation while he believes was heroine. Tox screen positive for barbiturates and opiates  History of psychiatric hospitalization and alcohol/drug rehab in 2018.   with 71 prescriptions, 18 prescribers, and 15 pharmacies.    Addiction Psychiatry consulted, Appreciate assistance.  - Pt on Zubsolv 5.7-1.4 mg TID at home. Discussed with psych resident; ordered 8-2mg BID regimen inpatient. Now pursuing Suboxone taper (completed 1/30).     - Valium taper per Psych guidance; completed 1/29.  - Pt's psychiatrist is Preeti Logan, though [facility] psych team is not sure if pt  was following as closely as he needed; Team contacted Dr. Anderson and provider no longer will follow patient if he is not seeking sobriety. Suboxone now tapered off. Per psych, they discussed with patient that he will need to f/u with a doctor outpt to get this medication and he expressed understanding. Addiction psych resource sheet with clinic, IOP programs, and residential rehab options have been given to patient.    Our team also frequently re-visited and discussed with patient the importance of drug cessation at discharge and the associated risks if patient continues to use. At this time, patient denies wanting to go to an inpatient facility, stating he needs to take care of some things first. It was strongly encouraged that patient enroll in a inpatient facility after he leaves the hospital. He reports he is considering it.     Per addiction psych, medications continuing at discharge: Cymbalta 60 mg, Wellbutrin  mg, Remeron 15 mg qhs, and Depakote 500 mg BID. Lyrica 300 BID is provided by another provider.    Right to left cardiac shunt  TTE with R to L shunt, PFO vs ASD  Stroke risk factor; Possible source of stroke  US BLE no evidence of DVT.  Ordered Ambulatory referral to Cardiology at discharge to consider possible closure; Appt scheduled by ANDRIY.    Alcohol use disorder, severe, dependence  Pt reported he presented to the ED intoxicated. Initially monitoring for signs and symptoms of withdrawal per CIWA protocol. Banana bag administered and Thiamine, MTVN, folic acid ordered.    Psychiatry consulted, appreciate recs.  S/p Valium taper, completed 1/29.  Counseled pt on cessations. He initially expressed interest in possible inpatint rehab resources, though at this time, he plans to discharge home and reports he may pursue inpatient rehab after that. A list of facilities have been provided by addiction Psych and ANDRIY/IKER.    Generalized anxiety disorder  Pt's pharmacy was called and home meds initially  continued.   Per psych team 1/27, suspect Zyprexa was not a recent home med but that patient had stopped it.  Depakote previously started for mood stabalizing & HA benefits. Per Psych, could d/c Zyprexa and increase Depakote to simplify psych regimen as well as continue to improve pt's report of HAs.    Pt now appears more comfortable, less anxious.   Currently on Depakote 500 mg BID - will continue outpatient     Essential hypertension  Stroke risk factor  SBP < 160; Long-term goal < 140  -131 over last 24 hrs. Clonidine patch had been started for withdrawal symptoms, thus did not continue home Amlodipine at discharge.  PCP follow-up    Cytotoxic cerebral edema  Area of cytotoxic cerebral edema identified when reviewing brain imaging in the territory of the R middle cerebral artery. There is mass effect associated with it. We will continue to monitor the patients clinical exam for any worsening of symptoms which may indicate expansion of the stroke or the area of the edema resulting in the clinical change. The pattern is suggestive of ESUS etiology.    Cephalalgia  Patient given multiple medications by ED staff for headache including Fioricet, dexamethasone, benadryl, remeron, and ketorolac.  D/c'd fioricet. Started Depakote (increased dose 1/27), Tylenol PRN.  Valium and suboxone tapers completed as well.    Scalp wound noted in reported area of head pain. No drainage noted at this time. Encouraged pt not to pick at the area as well.  Consulted Dermatology; Appreciate assistance. Bacterial culture ordered and result with MRSA - Bactrim DS 2 tabs BID x 7 days started 1/30. Mupirocin BID as well, applied to scalp as well as LILIANA nares.  Continue Ibuprofen Q6 PRN for headache as well    Tobacco use disorder  Stroke risk factor  Consider nicotine patch if necessary   pt on importance of cessation for secondary stroke prevention prior to discharge.    Major depressive disorder  Continue Cymbalta 60 mg  daily     Elevated liver enzymes  History of transaminitis  Elevated liver enzymes on admission but trended down.  Starting low-dose Atorvastatin    Suicidal ideations  History of suicidal ideations  No evidence thus far currently but will monitor   Previous provider called pt's pharmacy and re-ordered home psychiatric medications. Updated medication regimen per Psych assistance.         1/25 started on diazepam for possible withdrawal, continuing to complain of headache, psychiatry consulted   1/26 continuing valium taper, started suboxone yesterday due to persistent headache,   1/27: Increased Suboxone and continuing Valium taper per addiction psych assistance. D/c'd Zyprexa and increased Depakote. Troponin with downtrend.  1/28: wife at bedside overnight, night team discussed questions/concerns. Head wound on scalp without drainage, ordered ice packs and bactroban, consulted dermatology. Will attempt discharge today but want to ensure suboxone follow up. Patient completed 21 days at a rehab earlier this month but did not complete rehab due to insurance coverage. When he was admitted to rehab facility, he reports that facility discarded his suboxone per their protocol. When he left the facility, he did not have suboxone. Plans to discuss patient further with psychiatry and contact patient's wife  1/29 - Continuing Valium and Suboxone taper. Discussed with patient regarding [facility] - at this time does not want to go to that location. He would think about going to another rehab facility. CM/SW gathering information regarding other facilities. Bacterial culture pending.   1/30 - Suboxone taper ending today. Patient reports feeling okay today. Continuing Ibuprofen for HA. Would culture with MRSA - sensitive to Bactrim. Planned for Bactrim DS 2 tabs BID x 7 days. Wife with concern that Bactrim will not adequately treat MRSA due to PO Bactrim not being an adequate treatment for herself and family members in  the past ultimately leading to hospitalization and IV Vanc treatment. ID consulted - will see patient in the AM.    1/31: ID evaluated patient and recommended continuing Bactrim and Mupirocin ointment, educated patient on staph decolonization protocol for home. Staff discussed plan extensively with pt's wife over the phone. Ordered a 1-month supply of patient's medications for bedside delivery, paper prescription for outpatient SLP as well. Patient stable for discharge home.    STROKE DOCUMENTATION        NIH Scale:  1a. Level of Consciousness: 0-->Alert, keenly responsive  1b. LOC Questions: 0-->Answers both questions correctly  1c. LOC Commands: 0-->Performs both tasks correctly  2. Best Gaze: 0-->Normal  3. Visual: 1-->Partial hemianopia  4. Facial Palsy: 1-->Minor paralysis (flattened nasolabial fold, asymmetry on smiling)  5a. Motor Arm, Left: 0-->No drift, limb holds 90 (or 45) degrees for full 10 secs  5b. Motor Arm, Right: 0-->No drift, limb holds 90 (or 45) degrees for full 10 secs  6a. Motor Leg, Left: 0-->No drift, leg holds 30 degree position for full 5 secs  6b. Motor Leg, Right: 0-->No drift, leg holds 30 degree position for full 5 secs  7. Limb Ataxia: 0-->Absent  8. Sensory: 1-->Mild-to-moderate sensory loss, patient feels pinprick is less sharp or is dull on the affected side, or there is a loss of superficial pain with pinprick, but patient is aware of being touched  9. Best Language: 0-->No aphasia, normal  10. Dysarthria: 0-->Normal  11. Extinction and Inattention (formerly Neglect): 0-->No abnormality  Total (NIH Stroke Scale): 3       Modified Tien Score: 0  Kecia Coma Scale:    ABCD2 Score:    [id number] Score:   HAS -BLED Score:   ICH Score:   Hunt & Godwin Classification:      Hemorrhagic change of an Ischemic Stroke: Does this patient have an ischemic stroke with hemorrhagic changes? No     Neurologic Chief Complaint: Headache    Subjective:     Interval History: Patient is seen for  follow-up neurological assessment and treatment recommendations:   DAVID GEIGER evaluated patient and recommended continuing Bactrim and Mupirocin ointment, educated patient on staph decolonization protocol for home. Staff discussed plan extensively with pt's wife over the phone. Ordered a 1-month supply of patient's medications for bedside delivery, paper prescription for outpatient SLP as well. Patient stable for discharge home.     HPI, Past Medical, Family, and Social History remains the same as documented in the initial encounter.     Review of Systems   Constitutional: Negative for chills and fever.   Eyes: Positive for visual disturbance. Negative for discharge.   Skin: Positive for wound (scalp). Negative for rash.   Neurological: Positive for facial asymmetry, numbness and headaches. Negative for speech difficulty and weakness.   Psychiatric/Behavioral: Negative for agitation and decreased concentration.     Scheduled Meds:   aspirin  325 mg Oral Daily    atorvastatin  10 mg Oral Daily    buPROPion  300 mg Oral Daily    cloNIDine 0.1 mg/24 hr td ptwk  1 patch Transdermal Q7 Days    divalproex  500 mg Oral Q12H    DULoxetine  60 mg Oral Daily    folic acid  1 mg Oral Daily    heparin (porcine)  5,000 Units Subcutaneous Q8H    multivitamin  1 tablet Oral Daily    mupirocin   Topical (Top) BID    pregabalin  300 mg Oral BID    sulfamethoxazole-trimethoprim 800-160mg  2 tablet Oral BID    thiamine  100 mg Oral Daily    traZODone  25 mg Oral QHS     Continuous Infusions:   sodium chloride 0.9%       PRN Meds:acetaminophen, baclofen, ibuprofen, labetalol, lorazepam, sodium chloride 0.9%, sodium chloride 0.9%    Objective:     Vital Signs (Most Recent):  Temp: 98.6 °F (37 °C) (01/31/20 1514)  Pulse: 74 (01/31/20 1514)  Resp: 18 (01/31/20 1128)  BP: 127/88 (01/31/20 1514)  SpO2: (!) 94 % (01/31/20 1514)  BP Location: Right arm    Vital Signs Range (Last 24H):  Temp:  [97.7 °F (36.5 °C)-99 °F (37.2 °C)]    Pulse:  [67-83]   Resp:  [12-18]   BP: (111-131)/(56-88)   SpO2:  [4 %-96 %]   BP Location: Right arm    Physical Exam   Constitutional: He is oriented to person, place, and time. He appears well-developed and well-nourished. No distress.   HENT:   Head: Normocephalic and atraumatic.   Eyes: Conjunctivae and EOM are normal.   Cardiovascular: Normal rate.   Pulmonary/Chest: Effort normal. No respiratory distress.   Musculoskeletal: Normal range of motion. He exhibits no edema or deformity.   Neurological: He is alert and oriented to person, place, and time. He exhibits normal muscle tone.   Skin: Skin is warm and dry.   Small 2 cm scalp wound with surrounding edema     Psychiatric: His mood appears anxious. Cognition and memory are not impaired. He is attentive.   Vitals reviewed.        Neurological Exam:   LOC: alert  Attention Span: Good   Language: No aphasia  Articulation: No dysarthria  Orientation: Person, Place, Time   Visual Fields: Full  EOM (CN III, IV, VI): L upper quadrantanopsia  Facial Movement (CN VII): Lower facial weakness on the Left  Motor: Arm left  Normal 5/5  Leg left  Normal 5/5  Arm right  Normal 5/5  Leg right Normal 5/5  Cebellar: No evidence of appendicular or axial ataxia  Sensation: Matthew-hypoesthesia left (improving)   Tone: Normal tone throughout    Laboratory:  CMP:   No results for input(s): GLUCOSE, CALCIUM, ALBUMIN, PROT, NA, K, CO2, CL, BUN, CREATININE, ALKPHOS, ALT, AST, BILITOT in the last 24 hours.  CBC:   Recent Labs   Lab 01/30/20  0344   WBC 6.08   RBC 4.22*   HGB 13.5*   HCT 39.1*      MCV 93   MCH 32.0*   MCHC 34.5     Lipid Panel:   No results for input(s): CHOL, LDLCALC, HDL, TRIG in the last 168 hours.  Coagulation:   Recent Labs   Lab 01/25/20  0313   INR 1.0   APTT 29.3     Platelet Aggregation Study: No results for input(s): PLTAGG, PLTAGINTERP, PLTAGREGLACO, ADPPLTAGGREG in the last 168 hours.  Hgb A1C:   No results for input(s): HGBA1C in the last 168  hours.  TSH:   No results for input(s): TSH in the last 168 hours.      Diagnostic Results     Brain Imaging     MRI Brain WO Contrast Date: 01/25/20    Large right subtotal MCA vascular territory infarct without mass effect or hemorrhage.    CT Head. Date: 01/25/20  Moderate size region of parenchymal hypoattenuation and sulcal effacement involving the right parietooccipital region suggestive of evolving recent infarct without evidence of hemorrhagic conversion.  Additional, punctate hypodensities are present within the bilateral basal ganglia which appear more conspicuous from prior examination and additional regions of acute ischemia are not excluded.   No evidence of midline shift or hydrocephalus.    CT Head. Date: 01/24/20  Mild moderate hypoattenuation with sulcal effacement right superior occipital/inferior parietal lobes peripherally concerning for recent infarction though indeterminate. Clinical correlation and further evaluation with MRI recommended. There is no evidence for hydrocephalus or acute intracranial hemorrhage.      Vessel Imaging     CTA head and neck. Date: 01/25/20  No evidence of high-grade stenosis or major vascular occlusion.  Evolving subtotal right MCA infarct.  Evolving acute bilateral basal ganglia lacunar infarcts  Age advanced cerebral volume loss.    MRA Neck WO Contrast Date: 01/25/20  Technically very limited study.  Abnormal right carotid bulb, artifact versus stenosis.  Correlate with carotid Doppler.      Cardiac Imaging     TTE with bubble contrast. Date: 01/24/20  · Concentric left ventricular remodeling.  · Normal left ventricular systolic function. The estimated ejection fraction is 55%.  · The right ventricle is at the upper limit of normal in size with normal right ventricular systolic function.  · Normal LV diastolic function.  · Normal central venous pressure (3 mmHg).  · With agitated saline administration, there is evidence of considerable right to left shunting  consistent with a large PFO or small ASD.      Sandra Tristan PA-C  Comprehensive Stroke Center  Department of Vascular Neurology   [facility]

## 2020-01-31 NOTE — PLAN OF CARE
SW assigned to case today 01/31/2020. SW will assist team with DC needs. SW in communication with CM.    No SW needs identified at this time.        01/31/20 1242   Post-Acute Status   Post-Acute Authorization Other   Other Status No Post-Acute Service Needs     Kerri Alfredo LCSW  [facility]  [id number]

## 2020-01-31 NOTE — SUBJECTIVE & OBJECTIVE
Neurologic Chief Complaint: Headache    Subjective:     Interval History: Patient is seen for follow-up neurological assessment and treatment recommendations:   DAVID GEIGER evaluated patient and recommended continuing Bactrim and Mupirocin ointment, educated patient on staph decolonization protocol for home. Staff discussed plan extensively with pt's wife over the phone. Ordered a 1-month supply of patient's medications for bedside delivery, paper prescription for outpatient SLP as well. Patient stable for discharge home.     HPI, Past Medical, Family, and Social History remains the same as documented in the initial encounter.     Review of Systems   Constitutional: Negative for chills and fever.   Eyes: Positive for visual disturbance. Negative for discharge.   Skin: Positive for wound (scalp). Negative for rash.   Neurological: Positive for facial asymmetry, numbness and headaches. Negative for speech difficulty and weakness.   Psychiatric/Behavioral: Negative for agitation and decreased concentration.     Scheduled Meds:   aspirin  325 mg Oral Daily    atorvastatin  10 mg Oral Daily    buPROPion  300 mg Oral Daily    cloNIDine 0.1 mg/24 hr td ptwk  1 patch Transdermal Q7 Days    divalproex  500 mg Oral Q12H    DULoxetine  60 mg Oral Daily    folic acid  1 mg Oral Daily    heparin (porcine)  5,000 Units Subcutaneous Q8H    multivitamin  1 tablet Oral Daily    mupirocin   Topical (Top) BID    pregabalin  300 mg Oral BID    sulfamethoxazole-trimethoprim 800-160mg  2 tablet Oral BID    thiamine  100 mg Oral Daily    traZODone  25 mg Oral QHS     Continuous Infusions:   sodium chloride 0.9%       PRN Meds:acetaminophen, baclofen, ibuprofen, labetalol, lorazepam, sodium chloride 0.9%, sodium chloride 0.9%    Objective:     Vital Signs (Most Recent):  Temp: 98.6 °F (37 °C) (01/31/20 1514)  Pulse: 74 (01/31/20 1514)  Resp: 18 (01/31/20 1128)  BP: 127/88 (01/31/20 1514)  SpO2: (!) 94 % (01/31/20 1514)  BP  Location: Right arm    Vital Signs Range (Last 24H):  Temp:  [97.7 °F (36.5 °C)-99 °F (37.2 °C)]   Pulse:  [67-83]   Resp:  [12-18]   BP: (111-131)/(56-88)   SpO2:  [4 %-96 %]   BP Location: Right arm    Physical Exam   Constitutional: He is oriented to person, place, and time. He appears well-developed and well-nourished. No distress.   HENT:   Head: Normocephalic and atraumatic.   Eyes: Conjunctivae and EOM are normal.   Cardiovascular: Normal rate.   Pulmonary/Chest: Effort normal. No respiratory distress.   Musculoskeletal: Normal range of motion. He exhibits no edema or deformity.   Neurological: He is alert and oriented to person, place, and time. He exhibits normal muscle tone.   Skin: Skin is warm and dry.   Small 2 cm scalp wound with surrounding edema     Psychiatric: His mood appears anxious. Cognition and memory are not impaired. He is attentive.   Vitals reviewed.        Neurological Exam:   LOC: alert  Attention Span: Good   Language: No aphasia  Articulation: No dysarthria  Orientation: Person, Place, Time   Visual Fields: Full  EOM (CN III, IV, VI): L upper quadrantanopsia  Facial Movement (CN VII): Lower facial weakness on the Left  Motor: Arm left  Normal 5/5  Leg left  Normal 5/5  Arm right  Normal 5/5  Leg right Normal 5/5  Cebellar: No evidence of appendicular or axial ataxia  Sensation: Matthew-hypoesthesia left (improving)   Tone: Normal tone throughout    Laboratory:  CMP:   No results for input(s): GLUCOSE, CALCIUM, ALBUMIN, PROT, NA, K, CO2, CL, BUN, CREATININE, ALKPHOS, ALT, AST, BILITOT in the last 24 hours.  CBC:   Recent Labs   Lab 01/30/20  0344   WBC 6.08   RBC 4.22*   HGB 13.5*   HCT 39.1*      MCV 93   MCH 32.0*   MCHC 34.5     Lipid Panel:   No results for input(s): CHOL, LDLCALC, HDL, TRIG in the last 168 hours.  Coagulation:   Recent Labs   Lab 01/25/20  0313   INR 1.0   APTT 29.3     Platelet Aggregation Study: No results for input(s): PLTAGG, PLTAGINTERP, PLTAGREGLACO,  ADPPLTAGGREG in the last 168 hours.  Hgb A1C:   No results for input(s): HGBA1C in the last 168 hours.  TSH:   No results for input(s): TSH in the last 168 hours.      Diagnostic Results     Brain Imaging     MRI Brain WO Contrast Date: 01/25/20    Large right subtotal MCA vascular territory infarct without mass effect or hemorrhage.    CT Head. Date: 01/25/20  Moderate size region of parenchymal hypoattenuation and sulcal effacement involving the right parietooccipital region suggestive of evolving recent infarct without evidence of hemorrhagic conversion.  Additional, punctate hypodensities are present within the bilateral basal ganglia which appear more conspicuous from prior examination and additional regions of acute ischemia are not excluded.   No evidence of midline shift or hydrocephalus.    CT Head. Date: 01/24/20  Mild moderate hypoattenuation with sulcal effacement right superior occipital/inferior parietal lobes peripherally concerning for recent infarction though indeterminate. Clinical correlation and further evaluation with MRI recommended. There is no evidence for hydrocephalus or acute intracranial hemorrhage.      Vessel Imaging     CTA head and neck. Date: 01/25/20  No evidence of high-grade stenosis or major vascular occlusion.  Evolving subtotal right MCA infarct.  Evolving acute bilateral basal ganglia lacunar infarcts  Age advanced cerebral volume loss.    MRA Neck WO Contrast Date: 01/25/20  Technically very limited study.  Abnormal right carotid bulb, artifact versus stenosis.  Correlate with carotid Doppler.      Cardiac Imaging     TTE with bubble contrast. Date: 01/24/20  · Concentric left ventricular remodeling.  · Normal left ventricular systolic function. The estimated ejection fraction is 55%.  · The right ventricle is at the upper limit of normal in size with normal right ventricular systolic function.  · Normal LV diastolic function.  · Normal central venous pressure (3  mmHg).  · With agitated saline administration, there is evidence of considerable right to left shunting consistent with a large PFO or small ASD.

## 2020-01-31 NOTE — PLAN OF CARE
Plan of care reviewed.   Discharge orders received, patient educated on s/s of Stroke to report.   Patient verbalized.   Discharge orders reviewed to include follow up appointments, medications, activity, and wound care.   Patient verbalized above.   Patient states he can not get in touch with room mate for a ride and he does not have assess to funds for medications,    On call SW paged, physician aware of issues.   Will cont POC and discharge once medication cost and transportation arranged.

## 2020-01-31 NOTE — ASSESSMENT & PLAN NOTE
Pt's pharmacy was called and home meds initially continued.   Per psych team 1/27, suspect Zyprexa was not a recent home med but that patient had stopped it.  Depakote previously started for mood stabalizing & HA benefits. Per Psych, could d/c Zyprexa and increase Depakote to simplify psych regimen as well as continue to improve pt's report of HAs.    Pt now appears more comfortable, less anxious.   Currently on Depakote 500 mg BID - will continue outpatient

## 2020-01-31 NOTE — ASSESSMENT & PLAN NOTE
Scalp wound cultured, results showing MRSA - Bactrim DS 2 tabs BID x 7 days ordered.  Per patient and wife - patient with hx of MRSA. Per Care Everywhere, patient treated in 5/2018 for abscess under the arm and had been given Bactrim DS x 7 days. Per patient, physician did I&D on abscess. Patient is concerned PO Bactrim will not treat current infection due to family members recently with MRSA infection that did not respond to PO Bactrim and required IV Vanc.    ID consulted; Appreciate assistance. Recommend continuing Bactrim and Mupirocin ointment on scalp and nares. Based on wound culture sensitivities and in the setting of patient's young age and good kidney function, PO Bactrim should be as effective as IV Vanc therapy in this patient, and with lower associated risks.  ID educated patient on staph decolonization protocol to use for home. Patient can follow up for his PCP for this wound; signed off.  Staff discussed the above and plans extensively with pt's wife over the phone.

## 2020-01-31 NOTE — ASSESSMENT & PLAN NOTE
TTE with R to L shunt, PFO vs ASD  Stroke risk factor; Possible source of stroke  US BLE no evidence of DVT.  Ordered Ambulatory referral to Cardiology at discharge to consider possible closure; Appt scheduled by CM.

## 2020-01-31 NOTE — ASSESSMENT & PLAN NOTE
Mr. Coley is a 43 y/o male with fibromyalgia, alcohol and substance abuse, suicidal ideation, transaminitis, bipolar disorder, presents to ED intoxicated and notable for HA. Imaging studies demonstrate a right parietal infarct. He was admitted for vascular neurology evaluation. He was noted to have large PFO vs small ASD on TTE and plans to see Dr. Damico outpatient. We are consulted as he had a wound on his scalp that grew MRSA. He is currently on bactrim. No fever chills or night sweats. Family reports hx of recurrent MRSA infections.       1. Agree with bactrim DS po bid x 14 days for skin/soft tissue infection  2. Discussed with pt to review and practice staph decolonization protocol with family given recurrent staph infections.   3. mupirocin ointment to nares twice daily   4. Remained afebrile, stable. Seen and discussed with ID staff. Discussed with primary team. ID will sign off.

## 2020-01-31 NOTE — PLAN OF CARE
Problem: Fall Injury Risk  Goal: Absence of Fall and Fall-Related Injury  Outcome: Ongoing, Progressing     Problem: Adjustment to Illness (Stroke, Ischemic/Transient Ischemic Attack)  Goal: Optimal Coping  Outcome: Ongoing, Progressing  POC reviewed with pt at bedside.  Needs reinforcement.  No acute events this shift.  VS monitored.  Instructed on the adjustment to stroke and fall risks.  Questions answered and encouraged.  Isolation precautions maintained.  Bed low and locked.  Call light within reach.  WCTM.

## 2020-01-31 NOTE — PLAN OF CARE
Problem: SLP Goal  Goal: SLP Goal  Description  Speech Language Pathology Goals  Goals expected to be met by 1/31/2020  1. Pt will tolerate a regular diet with thin liquids w/o overt S/S aspiration, I'ly  2. Pt will participate in further assessment of reading, writing and visospatial skills  3. Pt will complete fx math tasks for time management with 90% accuracy, MOD I  4. Pt will recall at least 2/3 unrelated items for immediate recall and post 3 minute filled delay, 90% of attempts, MOD I  5. Educate Pt and family on S/S aspiration and aspiration precautions       Outcome: Ongoing, Progressing    Pt seen for cognitive-linguistic tx. Pt with mild difficult on functional math tasks for time management. Pt expresses frustration with difficulty with clock reading and time management. ST to continue to follow.     NED Haskins., [facility]-SLP  Speech-Language Pathology  Pager: [phone]  1/31/2020

## 2020-01-31 NOTE — PT/OT/SLP PROGRESS
"Speech Language Pathology Treatment    Patient Name:  Luke Coley   MRN:  [id number]  Admitting Diagnosis: Embolic stroke involving right middle cerebral artery    Recommendations:                 General Recommendations:  Cognitive-linguistic therapy  Diet recommendations:  Regular, Liquid Diet Level: Thin   Aspiration Precautions: Standard aspiration precautions   General Precautions: Standard, aspiration, fall, contact  Communication strategies:  none    Subjective     SLP reviewed Pt with RN  Pt presents calm  He explains, "I don't know why there were going to send me home if I had MRSA in my head"  Pt reported pain, RN in room to review and provide medications     Pain/Comfort:  · Pain Rating 1: 6/10  · Location - Orientation 1: generalized  · Location 1: head  · Pain Addressed 1: Reposition, Cessation of Activity, Nurse notified, Other (see comments)(rn in room to provode medications)  · Pain Rating Post-Intervention 1: 5/10    Objective:     Has the patient been evaluated by SLP for swallowing?   Yes  Keep patient NPO? No   Current Respiratory Status: room air      Pt seen for cognitive-linguistic tx. He was found awake in bed with call light in reach. Pt alert and oriented. He endorsed continued frustration with time telling tasks and headache pain. RN in room to provide medications 2/2 c/o pain during session. He completed fx math word problems for time management presented verbally with average 75% accuracy provided minimal verbal cues and use of visual aid (clock in room.) He was educated on compensatory strategies for executive functions, math and safety awareness for money/medication/scheduled tasks. Pt verbalized understanding and asked questions about ID consult and pending plans for management of infection, questions deferred to MD ( ID) team. No additional questions noted. Whiteboard current. Pt remained in bed with call light in reach upon SLP exit from room.     Assessment:     Luke" Remigio Coley is a 44 y.o. male with an SLP diagnosis of Cognitive-Linguistic Impairment.  He participates well with tx. He presents with frustration with headache pain and difficulty with clock/time tasks. ST to continue to follow.     Goals:   Multidisciplinary Problems     SLP Goals        Problem: SLP Goal    Goal Priority Disciplines Outcome   SLP Goal     SLP Ongoing, Progressing   Description:  Speech Language Pathology Goals  Goals expected to be met by 1/31/2020  1. Pt will tolerate a regular diet with thin liquids w/o overt S/S aspiration, I'ly  2. Pt will participate in further assessment of reading, writing and visospatial skills  3. Pt will complete fx math tasks for time management with 90% accuracy, MOD I  4. Pt will recall at least 2/3 unrelated items for immediate recall and post 3 minute filled delay, 90% of attempts, MOD I  5. Educate Pt and family on S/S aspiration and aspiration precautions                        Plan:     · Patient to be seen:  3 x/week   · Plan of Care expires:  02/23/20  · Plan of Care reviewed with:  patient   · SLP Follow-Up:  Yes       Discharge recommendations:  outpatient speech therapy       Time Tracking:     SLP Treatment Date:   01/31/20  Speech Start Time:  1037  Speech Stop Time:  1047     Speech Total Time (min):  10 min    Billable Minutes: Speech Therapy Individual 10    NED Haskins., [facility]-SLP  Speech-Language Pathology  Pager: [phone]      01/31/2020

## 2020-01-31 NOTE — ASSESSMENT & PLAN NOTE
Stroke risk factor  Patient reports snorting small amount of drugs night prior to presentation while he believes was heroine. Tox screen positive for barbiturates and opiates  History of psychiatric hospitalization and alcohol/drug rehab in 2018.   with 71 prescriptions, 18 prescribers, and 15 pharmacies.    Addiction Psychiatry consulted, Appreciate assistance.  - Pt on Zubsolv 5.7-1.4 mg TID at home. Discussed with psych resident; ordered 8-2mg BID regimen inpatient. Now pursuing Suboxone taper (completed 1/30).     - Valium taper per Psych guidance; completed 1/29.  - Pt's psychiatrist is Preeti Logan, though [facility] [place] sure if pt was following as closely as he needed; Team contacted Dr. Anderson and provider no longer will follow patient if he is not seeking sobriety. Suboxone now tapered off. Per psych, they discussed with patient that he will need to f/u with a doctor outpt to get this medication and he expressed understanding. Addiction psych resource sheet with clinic, IOP programs, and residential rehab options have been given to patient.    Our team also frequently re-visited and discussed with patient the importance of drug cessation at discharge and the associated risks if patient continues to use. At this time, patient denies wanting to go to an inpatient facility, stating he needs to take care of some things first. It was strongly encouraged that patient enroll in a inpatient facility after he leaves the hospital. He reports he is considering it.     Per addiction psych, medications continuing at discharge: Cymbalta 60 mg, Wellbutrin  mg, Remeron 15 mg qhs, and Depakote 500 mg BID. Lyrica 300 BID is provided by another provider.

## 2020-02-01 NOTE — NURSING
On call SW paged related to transportation.   Message left.   IV Jelco removed and patient miles well.   Site clear without redness or edema.  Tele box removed.   Medication are at bedside.   Will await transport and cont POC.

## 2020-02-01 NOTE — PLAN OF CARE
Cost transfer form for $63.12 for medications emailed to outpatient pharmacy and bedside delivery to be sent.   Transportation for discharge to be arranged once medications delivered.

## 2020-02-03 NOTE — ASSESSMENT & PLAN NOTE
One of pt's pharmacies was called and home meds were initially continued.   Per psych team 1/27, suspect Zyprexa was not a recent home med but that patient had stopped it.  Depakote previously started for mood stabalizing & HA benefits. Per Psych, could d/c Zyprexa and increase Depakote to simplify psych regimen as well as continue to improve pt's report of HAs.    Pt now appears more comfortable, less anxious.   Currently on Depakote 500 mg BID - will continue outpatient

## 2020-02-03 NOTE — ASSESSMENT & PLAN NOTE
44 y.o. man with medical history of HTN, fibromyalgia, alcohol abuse, suicidal ideation, elevated liver enzymes and bipolar disorder who presented to ED intoxicated. CT ordered in ED due to complaints of HA. CT Head with R parietal infarct. Admitted patient to stroke unit for further workup.     MRI Brain confirmed R posterior MCA infarct, as well as LILIANA globus pallidus (basal ganglia) disease, a pattern often seen with opioid use. TTE with large PFO vs small ASD. Stroke etiology of unclear source but concerns of paradoxical embolus present due to cardiac shunt on echo (US BLE negative for DVT.) Ordered Ambulatory referral to Cardiology at discharge to consider possible closure. Alternative etiologies could relate to drug use; regardless of etiology, continuing to strongly encourage cessation, inpatient rehab admission.     ID gave final recommendations. Staff discussed plan extensively with pt's wife over the phone. Ordered a 1-month supply of patient's medications for bedside delivery (except Lyrica which is supplied by another medical provider), paper prescription for outpatient SLP as well. Patient stable for discharge home.      Antithrombotics for secondary stroke prevention: Antiplatelets: Aspirin: 325 mg daily  Statins for secondary stroke prevention and hyperlipidemia, if present:   Statins: Atorvastatin- 10 mg daily. LFTs improved, started lower dose stain due to history of alcoholism.  Aggressive risk factor modification: HTN, Smoking, alcohol and drug use  Rehab efforts: Outpatient SLP only  BP parameters: Infarct: Long-term goal < 140

## 2020-02-03 NOTE — ASSESSMENT & PLAN NOTE
Stroke risk factor  Patient reports snorting small amount of drugs night prior to presentation which he believes was heroine. Tox screen positive for barbiturates and opiates.  History of psychiatric hospitalization and alcohol/drug rehab in 2018.   with 71 prescriptions, 18 prescribers, and 15 pharmacies.    Addiction Psychiatry consulted, Appreciate assistance.  - Pt on Zubsolv 5.7-1.4 mg TID at home. Discussed with psych resident; ordered 8-2mg BID regimen inpatient. Now pursuing Suboxone taper (completed 1/30).   - Valium taper per Psych guidance; completed 1/29.  - Pt's psychiatrist is Preeti Logan, [place] [facility] [place] if [place] was following as closely as he needed. Team contacted Dr. Anderson and provider no longer will follow patient if he is not seeking sobriety. Suboxone now tapered off. Per psych, they discussed with patient that he will need to pursue f/u with a doctor outpt to get this medication and he expressed understanding. Addiction psych resource sheet with clinic, IOP programs, and residential rehab options have been given to patient.    Our team also frequently re-visited and discussed with patient the importance of drug cessation at discharge and the associated risks if patient continues to use. At this time, patient denies wanting to go to an inpatient facility, stating he needs to take care of some things first. It was strongly encouraged that patient enroll in a inpatient facility after he leaves the hospital. He reports he is considering it.     Per addiction psych, medications continuing at discharge: Cymbalta 60 mg, Wellbutrin  mg, Remeron 15 mg qhs, and Depakote 500 mg BID. Lyrica 300 BID is supplied by another medical provider.

## 2020-02-03 NOTE — DISCHARGE SUMMARY
[facility]  Vascular Neurology  Comprehensive Stroke Center  Discharge Summary     Summary:     Admit Date: 1/24/2020  6:25 AM    Discharge Date and Time: 1/31/2020  9:16 PM    Attending Physician: Troy Mock DO     Discharge Provider: Sandra Tristan PA-C    History of Present Illness: Luke Coley is a 44 y.o. male medical history of HTN, fibromyalgia, alcohol and drug abuse, suicidal ideation, elevated liver enzymes and bipolar disorder who presented to ED intoxicated.  Patient stated he was out with friends last night drinking alcohol and snorted a small amount of drugs (possibly heroin).  When asked about where he slept for the night, patient states he was looking for his car all night. He presented to the emergency room around 6:00 a.m. this morning.  Currently, the patient is complaining of confusion, dizziness, headache, left facial numbness, and left-sided numbness.  Due to complaints of headache, ED physician ordered CT head which revealed right parietal infarct, stroke team consult. Patient states he drinks once per week but chart review indicates that he has been to rehab for alcohol/drug abuse in 2018.    Hospital Course (synopsis of major diagnoses, care, treatment, and services provided during the course of the hospital stay): Mr. Luke Coley was admitted to Vascular Neurology for acute stroke workup. Stroke etiology suspected to be ESUS at this time; possible cardioembolic due to PFO/possible paradoxical embolus vs related to drug abuse. Further outpatient Cardiology evaluation & management to be pursued at discharge. Hospital stay extended due to patient undergoing Valium and Suboxone tapers with Addiction Psych assistance, as well as evaluation and treatment of scalp wound/MRSA. Patient was given recommendations for outpatient SLP therapy only.     Family by phone was not amenable to the above plan, resistant to discharge, however staff physician had  long discussion with pt & wife (they are currently ); reassured that pt has been seen by dermatology & ID consultants and recommendations for appropriate Abx were given, and patient was given detailed discharge instructions regarding outpatient PCP follow up for wound care and Cardiology follow-up to discuss PFO closure. Again strongly emphasized that our recommendation would be to prioritize pursuit of residential drug rehabilitation.     Patient with improvement in stroke symptoms since admission. Inpatient acute stroke work up completed and patient stable for discharge. Please see all appropriate medication changes, imaging results, and necessary follow-up below.    Stroke Etiology: Possible PFO with small right to left shunt Cardio-Aortic Embolism (CE)    STROKE DOCUMENTATION         NIH Scale:  1a. Level of Consciousness: 0-->Alert, keenly responsive  1b. LOC Questions: 0-->Answers both questions correctly  1c. LOC Commands: 0-->Performs both tasks correctly  2. Best Gaze: 0-->Normal  3. Visual: 1-->Partial hemianopia  4. Facial Palsy: 1-->Minor paralysis (flattened nasolabial fold, asymmetry on smiling)  5a. Motor Arm, Left: 0-->No drift, limb holds 90 (or 45) degrees for full 10 secs  5b. Motor Arm, Right: 0-->No drift, limb holds 90 (or 45) degrees for full 10 secs  6a. Motor Leg, Left: 0-->No drift, leg holds 30 degree position for full 5 secs  6b. Motor Leg, Right: 0-->No drift, leg holds 30 degree position for full 5 secs  7. Limb Ataxia: 0-->Absent  8. Sensory: 1-->Mild-to-moderate sensory loss, patient feels pinprick is less sharp or is dull on the affected side, or there is a loss of superficial pain with pinprick, but patient is aware of being touched  9. Best Language: 0-->No aphasia, normal  10. Dysarthria: 0-->Normal  11. Extinction and Inattention (formerly Neglect): 0-->No abnormality  Total (NIH Stroke Scale): 3        Modified Tien Score: 1  [place] Coma Scale:    ABCD2 Score:     [id number] Score:   HAS -BLED Score:   ICH Score:   Hunt & Godwin Classification:       Assessment/Plan:     Diagnostic Results:    Brain Imaging      MRI Brain WO Contrast Date: 01/25/20    Large right subtotal MCA vascular territory infarct without mass effect or hemorrhage.     CT Head. Date: 01/25/20  Moderate size region of parenchymal hypoattenuation and sulcal effacement involving the right parietooccipital region suggestive of evolving recent infarct without evidence of hemorrhagic conversion.  Additional, punctate hypodensities are present within the bilateral basal ganglia which appear more conspicuous from prior examination and additional regions of acute ischemia are not excluded.   No evidence of midline shift or hydrocephalus.     CT Head. Date: 01/24/20  Mild moderate hypoattenuation with sulcal effacement right superior occipital/inferior parietal lobes peripherally concerning for recent infarction though indeterminate. Clinical correlation and further evaluation with MRI recommended. There is no evidence for hydrocephalus or acute intracranial hemorrhage.        Vessel Imaging      CTA head and neck. Date: 01/25/20  No evidence of high-grade stenosis or major vascular occlusion.  Evolving subtotal right MCA infarct.  Evolving acute bilateral basal ganglia lacunar infarcts  Age advanced cerebral volume loss.     MRA Neck WO Contrast Date: 01/25/20  Technically very limited study.  Abnormal right carotid bulb, artifact versus stenosis.  Correlate with carotid Doppler.        Cardiac Imaging      TTE with bubble contrast. Date: 01/24/20  · Concentric left ventricular remodeling.  · Normal left ventricular systolic function. The estimated ejection fraction is 55%.  · The right ventricle is at the upper limit of normal in size with normal right ventricular systolic function.  · Normal LV diastolic function.  · Normal central venous pressure (3 mmHg).  · With agitated saline administration, there is  evidence of considerable right to left shunting consistent with a large PFO or small ASD.      Interventions: None    Complications: None    Disposition: Home or Self Care    Final Active Diagnoses:    Diagnosis Date Noted POA    PRINCIPAL PROBLEM:  Embolic stroke involving right middle cerebral artery [I63.411] 01/24/2020 Yes    Infection of wound due to methicillin resistant Staphylococcus aureus (MRSA) [A49.02] 01/28/2020 Yes    Opioid use disorder, severe, on maintenance therapy, dependence [F11.20] 01/24/2020 Yes    Right to left cardiac shunt [I28.0] 01/25/2020 Yes    Alcohol use disorder, severe, dependence [F10.20] 12/28/2017 Yes    Generalized anxiety disorder [F41.1]  Yes    Cephalalgia [R51] 01/24/2020 Yes    Cytotoxic cerebral edema [G93.6] 01/24/2020 Yes    Essential hypertension [I10] 01/24/2020 Yes    Tobacco use disorder [F17.200] 09/18/2018 Yes    Major depressive disorder [F32.9] 09/17/2018 Yes    Elevated liver enzymes [R74.8] 09/18/2018 Yes    Suicidal ideations [R45.851] 12/28/2017 Not Applicable    Substance induced mood disorder [F19.94] 01/27/2020 Yes    Alcohol withdrawal syndrome with complication [F10.239] 01/27/2020 Yes    Basal ganglia disorder [G25.9] 01/25/2020 Yes      Problems Resolved During this Admission:     No new Assessment & Plan notes have been filed under this hospital service since the last note was generated.  Service: Vascular Neurology      Recommendations:     Post-discharge complication risks: Falls, Seizure    Stroke Education given to: patient and family    Follow-up in Stroke Clinic in 4-8 weeks.     Discharge Plan:  Antithrombotics: Aspirin 325mg  Statin: Atorvastatin 10mg  Smoking Cessation  Aggresive risk factor modification:  Hypertension  Smoking  Diet  Exercise  Alcohol & drug abuse    Follow Up:  Follow-up Information     Koko Stockton Jr, MD. Go on 2/5/2020.    Why:  Hospital Follow-up at 1:15pm. Health maintenance, management of stroke  risk factors, and follow-up of head wound.  Contact information:  [address]  [place] [id number] [phone]             [facility]. Go on 2/7/2020.    Specialty:  Cardiology  Why:  Dr. Tyson 2pm for work-up and evaluation of PFO in the setting of ischemic stroke.  Contact information:  [facility]  [facility] [address] [phone]  Additional information:  3rd floor           [facility] VASCULAR NEUROLOGY In 6 weeks.    Specialty:  Vascular Neurology  Why:  Someone will call you to set up hospital follow-up in 4-8 weeks. If nobody calls within 1 week, call number above to schedule an appt.  Contact information:  [facility]  [facility] [id number] [phone]                 Patient Instructions:      Ambulatory Referral to Interventional Cardiology   Referral Priority: Routine Referral Type: Consultation   Referral Reason: Specialty Services Required   Referred to Provider: LISA TO Requested Specialty: INTERVENTIONAL CARDIOLOGY   Number of Visits Requested: 1       Medications:  Reconciled Home Medications:      Medication List      START taking these medications    aspirin 325 MG tablet  Take 1 tablet (325 mg total) by mouth once daily.     atorvastatin 10 MG tablet  Commonly known as:  LIPITOR  Take 1 tablet (10 mg total) by mouth once daily.     baclofen 5 mg Tab tablet  Commonly known as:  LIORESAL  Take 1 tablet (5 mg total) by mouth 3 (three) times daily as needed.     buPROPion 300 MG 24 hr tablet  Commonly known as:  WELLBUTRIN XL  Take 1 tablet (300 mg total) by mouth once daily.     cloNIDine 0.1 mg/24 hr td ptwk 0.1 mg/24 hr  Commonly known as:  CATAPRES  Place 1 patch onto the skin every 7 days.  Start taking on:  February 5, 2020     divalproex 500 MG Tbec  Commonly known as:  DEPAKOTE  Take 1 tablet (500 mg total) by mouth every 12 (twelve) hours.     ibuprofen 600 MG tablet  Commonly known as:  ADVIL,MOTRIN  Take 1 tablet  (600 mg total) by mouth every 6 (six) hours as needed.     mupirocin 2 % ointment  Commonly known as:  BACTROBAN  Apply topically 2 (two) times daily. Apply to SCALP and INTRANASALLY for 7 days     sulfamethoxazole-trimethoprim 800-160mg 800-160 mg Tab  Commonly known as:  BACTRIM DS  Take 2 tablets by mouth 2 (two) times daily. for 14 days        CHANGE how you take these medications    DULoxetine 60 MG capsule  Commonly known as:  CYMBALTA  Take 1 capsule (60 mg total) by mouth once daily.  What changed:  when to take this        CONTINUE taking these medications    mirtazapine 15 MG tablet  Commonly known as:  REMERON  Take 1 tablet (15 mg total) by mouth every evening.        STOP taking these medications    amLODIPine 5 MG tablet  Commonly known as:  NORVASC     gabapentin 400 MG capsule  Commonly known as:  NEURONTIN     lamoTRIgine 200 MG tablet  Commonly known as:  LAMICTAL     metoprolol tartrate 50 MG tablet  Commonly known as:  LOPRESSOR     Zubsolv 5.7-1.4 mg Subl  Generic drug:  buprenorphine-naloxone            Sandra Tristan PA-C  Comprehensive Stroke Center  Department of Vascular Neurology   [facility]

## 2020-02-03 NOTE — ASSESSMENT & PLAN NOTE
History of transaminitis  Elevated liver enzymes on admission but trended down.  Started low-dose Atorvastatin

## 2020-02-03 NOTE — ASSESSMENT & PLAN NOTE
Pt reportedly presented to the ED intoxicated. Initially monitoring for signs and symptoms of withdrawal per CIWA protocol. Banana bag administered and Thiamine, MTVN, folic acid ordered.  Psychiatry consulted, appreciate recs.  S/p Valium taper, completed 1/29.  Counseled pt on cessations. He initially expressed interest in possible inpatint rehab resources, though at this time, he plans to discharge home and reports he may pursue inpatient rehab after that. A list of facilities have been provided by addiction Psych and ANDRIY/IKER.

## 2020-02-03 NOTE — ASSESSMENT & PLAN NOTE
Area of cytotoxic cerebral edema identified when reviewing brain imaging in the territory of the R middle cerebral artery. There is mass effect associated with it.   The patients clinical exam remained without any worsening of symptoms which may indicate expansion of the stroke or the area of the edema resulting in the clinical change. The pattern is suggestive of ESUS etiology.

## 2020-02-03 NOTE — ASSESSMENT & PLAN NOTE
Patient given multiple medications by ED staff for headache including Fioricet, dexamethasone, benadryl, remeron, and ketorolac.  D/c'd fioricet. Started Depakote (increased dose 1/27), Tylenol PRN.  Valium and suboxone tapers completed as well.    Scalp wound noted in reported area of head pain. No drainage noted. Encouraged pt not to pick at the area.  Consulted Dermatology; Appreciate assistance. Bacterial wound culture showed MRSA, resistant organism but sensitive to Bactrim.   Bactrim DS 2 tabs BID x 7 days started 1/30. Mupirocin BID as well, applied to scalp as well as LILIANA nares.  Continue Ibuprofen Q6 PRN for headache as well

## 2020-02-03 NOTE — ASSESSMENT & PLAN NOTE
History of suicidal ideations. Denies SI/HI throughout hospital stay.  Previous provider called one of pt's pharmacies and re-ordered home psychiatric medications. Later updated medication regimen per Psych assistance.

## 2020-02-09 ENCOUNTER — HOSPITAL ENCOUNTER (INPATIENT)
Facility: HOSPITAL | Age: 45
LOS: 8 days | Discharge: HOME HEALTH | DRG: 602 | End: 2020-02-17
Attending: EMERGENCY MEDICINE | Admitting: EMERGENCY MEDICINE
Payer: COMMERCIAL

## 2020-02-09 DIAGNOSIS — F19.11 HISTORY OF DRUG ABUSE: ICD-10-CM

## 2020-02-09 DIAGNOSIS — A49.02 MRSA INFECTION: ICD-10-CM

## 2020-02-09 DIAGNOSIS — L02.91 ABSCESS: ICD-10-CM

## 2020-02-09 DIAGNOSIS — Z86.73 HISTORY OF STROKE: Primary | ICD-10-CM

## 2020-02-09 DIAGNOSIS — Q21.12 PFO (PATENT FORAMEN OVALE): ICD-10-CM

## 2020-02-09 PROBLEM — M79.7 FIBROMYALGIA: Status: ACTIVE | Noted: 2020-02-09

## 2020-02-09 PROBLEM — E78.5 HLD (HYPERLIPIDEMIA): Status: ACTIVE | Noted: 2020-02-09

## 2020-02-09 PROBLEM — I63.9 ISCHEMIC STROKE: Status: ACTIVE | Noted: 2020-02-09

## 2020-02-09 PROBLEM — R56.9 SEIZURE: Status: ACTIVE | Noted: 2020-02-09

## 2020-02-09 PROBLEM — G89.4 CHRONIC PAIN SYNDROME: Status: ACTIVE | Noted: 2017-07-07

## 2020-02-09 LAB
ALBUMIN SERPL BCP-MCNC: 4.2 G/DL (ref 3.5–5.2)
ALP SERPL-CCNC: 78 U/L (ref 55–135)
ALT SERPL W/O P-5'-P-CCNC: 17 U/L (ref 10–44)
ANION GAP SERPL CALC-SCNC: 11 MMOL/L (ref 8–16)
AST SERPL-CCNC: 24 U/L (ref 10–40)
BASOPHILS # BLD AUTO: 0.04 K/UL (ref 0–0.2)
BASOPHILS NFR BLD: 0.7 % (ref 0–1.9)
BILIRUB SERPL-MCNC: 0.3 MG/DL (ref 0.1–1)
BUN SERPL-MCNC: 10 MG/DL (ref 6–20)
CALCIUM SERPL-MCNC: 9.7 MG/DL (ref 8.7–10.5)
CHLORIDE SERPL-SCNC: 105 MMOL/L (ref 95–110)
CO2 SERPL-SCNC: 23 MMOL/L (ref 23–29)
CREAT SERPL-MCNC: 1.4 MG/DL (ref 0.5–1.4)
DIFFERENTIAL METHOD: ABNORMAL
EOSINOPHIL # BLD AUTO: 0.1 K/UL (ref 0–0.5)
EOSINOPHIL NFR BLD: 1.1 % (ref 0–8)
ERYTHROCYTE [DISTWIDTH] IN BLOOD BY AUTOMATED COUNT: 13.1 % (ref 11.5–14.5)
EST. GFR  (AFRICAN AMERICAN): >60 ML/MIN/1.73 M^2
EST. GFR  (NON AFRICAN AMERICAN): >60 ML/MIN/1.73 M^2
GLUCOSE SERPL-MCNC: 105 MG/DL (ref 70–110)
HCT VFR BLD AUTO: 39.1 % (ref 40–54)
HGB BLD-MCNC: 13.7 G/DL (ref 14–18)
IMM GRANULOCYTES # BLD AUTO: 0.01 K/UL (ref 0–0.04)
IMM GRANULOCYTES NFR BLD AUTO: 0.2 % (ref 0–0.5)
LACTATE SERPL-SCNC: 0.6 MMOL/L (ref 0.5–2.2)
LACTATE SERPL-SCNC: 0.7 MMOL/L (ref 0.5–2.2)
LYMPHOCYTES # BLD AUTO: 1.3 K/UL (ref 1–4.8)
LYMPHOCYTES NFR BLD: 23.1 % (ref 18–48)
MCH RBC QN AUTO: 32.4 PG (ref 27–31)
MCHC RBC AUTO-ENTMCNC: 35 G/DL (ref 32–36)
MCV RBC AUTO: 92 FL (ref 82–98)
MONOCYTES # BLD AUTO: 0.4 K/UL (ref 0.3–1)
MONOCYTES NFR BLD: 7.3 % (ref 4–15)
NEUTROPHILS # BLD AUTO: 3.7 K/UL (ref 1.8–7.7)
NEUTROPHILS NFR BLD: 67.6 % (ref 38–73)
NRBC BLD-RTO: 0 /100 WBC
PLATELET # BLD AUTO: 308 K/UL (ref 150–350)
PMV BLD AUTO: 9.8 FL (ref 9.2–12.9)
POTASSIUM SERPL-SCNC: 4.5 MMOL/L (ref 3.5–5.1)
PROCALCITONIN SERPL IA-MCNC: 0.03 NG/ML
PROT SERPL-MCNC: 7.8 G/DL (ref 6–8.4)
RBC # BLD AUTO: 4.23 M/UL (ref 4.6–6.2)
SODIUM SERPL-SCNC: 139 MMOL/L (ref 136–145)
WBC # BLD AUTO: 5.5 K/UL (ref 3.9–12.7)

## 2020-02-09 PROCEDURE — 99223 PR INITIAL HOSPITAL CARE,LEVL III: ICD-10-PCS | Mod: ,,, | Performed by: STUDENT IN AN ORGANIZED HEALTH CARE EDUCATION/TRAINING PROGRAM

## 2020-02-09 PROCEDURE — 63600175 PHARM REV CODE 636 W HCPCS: Performed by: EMERGENCY MEDICINE

## 2020-02-09 PROCEDURE — A9585 GADOBUTROL INJECTION: HCPCS | Performed by: EMERGENCY MEDICINE

## 2020-02-09 PROCEDURE — 99285 EMERGENCY DEPT VISIT HI MDM: CPT | Mod: 25

## 2020-02-09 PROCEDURE — 83605 ASSAY OF LACTIC ACID: CPT | Mod: 91

## 2020-02-09 PROCEDURE — 84145 PROCALCITONIN (PCT): CPT

## 2020-02-09 PROCEDURE — 87040 BLOOD CULTURE FOR BACTERIA: CPT

## 2020-02-09 PROCEDURE — 99285 EMERGENCY DEPT VISIT HI MDM: CPT | Mod: ,,, | Performed by: EMERGENCY MEDICINE

## 2020-02-09 PROCEDURE — 96374 THER/PROPH/DIAG INJ IV PUSH: CPT

## 2020-02-09 PROCEDURE — 11000001 HC ACUTE MED/SURG PRIVATE ROOM

## 2020-02-09 PROCEDURE — 99223 PR INITIAL HOSPITAL CARE,LEVL III: ICD-10-PCS | Mod: ,,, | Performed by: PSYCHIATRY & NEUROLOGY

## 2020-02-09 PROCEDURE — 25500020 PHARM REV CODE 255: Performed by: EMERGENCY MEDICINE

## 2020-02-09 PROCEDURE — 99223 1ST HOSP IP/OBS HIGH 75: CPT | Mod: ,,, | Performed by: PSYCHIATRY & NEUROLOGY

## 2020-02-09 PROCEDURE — 25000003 PHARM REV CODE 250: Performed by: STUDENT IN AN ORGANIZED HEALTH CARE EDUCATION/TRAINING PROGRAM

## 2020-02-09 PROCEDURE — 99285 PR EMERGENCY DEPT VISIT,LEVEL V: ICD-10-PCS | Mod: ,,, | Performed by: EMERGENCY MEDICINE

## 2020-02-09 PROCEDURE — 99223 1ST HOSP IP/OBS HIGH 75: CPT | Mod: ,,, | Performed by: STUDENT IN AN ORGANIZED HEALTH CARE EDUCATION/TRAINING PROGRAM

## 2020-02-09 PROCEDURE — 80053 COMPREHEN METABOLIC PANEL: CPT

## 2020-02-09 PROCEDURE — 85025 COMPLETE CBC W/AUTO DIFF WBC: CPT

## 2020-02-09 RX ORDER — VANCOMYCIN 1.75 GRAM/500 ML IN 0.9 % SODIUM CHLORIDE INTRAVENOUS
1750
Status: DISPENSED | OUTPATIENT
Start: 2020-02-10 | End: 2020-02-15

## 2020-02-09 RX ORDER — TALC
6 POWDER (GRAM) TOPICAL NIGHTLY PRN
Status: DISCONTINUED | OUTPATIENT
Start: 2020-02-09 | End: 2020-02-17 | Stop reason: HOSPADM

## 2020-02-09 RX ORDER — GADOBUTROL 604.72 MG/ML
10 INJECTION INTRAVENOUS
Status: COMPLETED | OUTPATIENT
Start: 2020-02-09 | End: 2020-02-09

## 2020-02-09 RX ORDER — VANCOMYCIN 2 GRAM/500 ML IN 0.9 % SODIUM CHLORIDE INTRAVENOUS
2000
Status: COMPLETED | OUTPATIENT
Start: 2020-02-09 | End: 2020-02-09

## 2020-02-09 RX ORDER — SENNOSIDES 8.6 MG/1
8.6 TABLET ORAL DAILY PRN
Status: DISCONTINUED | OUTPATIENT
Start: 2020-02-09 | End: 2020-02-17 | Stop reason: HOSPADM

## 2020-02-09 RX ORDER — GLUCAGON 1 MG
1 KIT INJECTION
Status: DISCONTINUED | OUTPATIENT
Start: 2020-02-09 | End: 2020-02-17 | Stop reason: HOSPADM

## 2020-02-09 RX ORDER — IBUPROFEN 200 MG
24 TABLET ORAL
Status: DISCONTINUED | OUTPATIENT
Start: 2020-02-09 | End: 2020-02-17 | Stop reason: HOSPADM

## 2020-02-09 RX ORDER — DULOXETIN HYDROCHLORIDE 60 MG/1
60 CAPSULE, DELAYED RELEASE ORAL DAILY
Status: DISCONTINUED | OUTPATIENT
Start: 2020-02-10 | End: 2020-02-17 | Stop reason: HOSPADM

## 2020-02-09 RX ORDER — SODIUM CHLORIDE 0.9 % (FLUSH) 0.9 %
10 SYRINGE (ML) INJECTION
Status: DISCONTINUED | OUTPATIENT
Start: 2020-02-09 | End: 2020-02-17 | Stop reason: HOSPADM

## 2020-02-09 RX ORDER — POLYETHYLENE GLYCOL 3350 17 G/17G
17 POWDER, FOR SOLUTION ORAL DAILY
Status: DISCONTINUED | OUTPATIENT
Start: 2020-02-10 | End: 2020-02-17 | Stop reason: HOSPADM

## 2020-02-09 RX ORDER — PROMETHAZINE HYDROCHLORIDE 25 MG/1
25 TABLET ORAL EVERY 6 HOURS PRN
Status: DISCONTINUED | OUTPATIENT
Start: 2020-02-09 | End: 2020-02-17 | Stop reason: HOSPADM

## 2020-02-09 RX ORDER — DIVALPROEX SODIUM 250 MG/1
500 TABLET, DELAYED RELEASE ORAL EVERY 12 HOURS
Status: DISCONTINUED | OUTPATIENT
Start: 2020-02-09 | End: 2020-02-17 | Stop reason: HOSPADM

## 2020-02-09 RX ORDER — ONDANSETRON 8 MG/1
8 TABLET, ORALLY DISINTEGRATING ORAL EVERY 8 HOURS PRN
Status: DISCONTINUED | OUTPATIENT
Start: 2020-02-09 | End: 2020-02-17 | Stop reason: HOSPADM

## 2020-02-09 RX ORDER — ATORVASTATIN CALCIUM 10 MG/1
10 TABLET, FILM COATED ORAL DAILY
Status: DISCONTINUED | OUTPATIENT
Start: 2020-02-10 | End: 2020-02-17 | Stop reason: HOSPADM

## 2020-02-09 RX ORDER — MIRTAZAPINE 15 MG/1
15 TABLET, FILM COATED ORAL NIGHTLY
Status: DISCONTINUED | OUTPATIENT
Start: 2020-02-09 | End: 2020-02-17 | Stop reason: HOSPADM

## 2020-02-09 RX ORDER — CLONIDINE 0.1 MG/24H
1 PATCH, EXTENDED RELEASE TRANSDERMAL
Status: DISCONTINUED | OUTPATIENT
Start: 2020-02-10 | End: 2020-02-17 | Stop reason: HOSPADM

## 2020-02-09 RX ORDER — IBUPROFEN 200 MG
16 TABLET ORAL
Status: DISCONTINUED | OUTPATIENT
Start: 2020-02-09 | End: 2020-02-17 | Stop reason: HOSPADM

## 2020-02-09 RX ORDER — BUPROPION HYDROCHLORIDE 150 MG/1
300 TABLET ORAL DAILY
Status: DISCONTINUED | OUTPATIENT
Start: 2020-02-10 | End: 2020-02-09

## 2020-02-09 RX ORDER — ACETAMINOPHEN 325 MG/1
650 TABLET ORAL EVERY 4 HOURS PRN
Status: DISCONTINUED | OUTPATIENT
Start: 2020-02-09 | End: 2020-02-17 | Stop reason: HOSPADM

## 2020-02-09 RX ADMIN — GADOBUTROL 10 ML: 604.72 INJECTION INTRAVENOUS at 12:02

## 2020-02-09 RX ADMIN — MIRTAZAPINE 15 MG: 15 TABLET, FILM COATED ORAL at 09:02

## 2020-02-09 RX ADMIN — VANCOMYCIN HYDROCHLORIDE 2000 MG: 100 INJECTION, POWDER, LYOPHILIZED, FOR SOLUTION INTRAVENOUS at 12:02

## 2020-02-09 RX ADMIN — DIVALPROEX SODIUM 500 MG: 250 TABLET, DELAYED RELEASE ORAL at 09:02

## 2020-02-09 RX ADMIN — SODIUM CHLORIDE 2925 ML: 0.9 INJECTION, SOLUTION INTRAVENOUS at 12:02

## 2020-02-09 NOTE — HOSPITAL COURSE
The pt was admitted to hospital medicine on 02/09/2020 for MRSA head abscess and recent ischemic stroke with hemorrhagic conversion in the right temporal perieto- occipital region.     CVA- Evaluated by Vascular Neurology who reviewed imaging. Findings consistent with post stroke evolution. They recommended no change in management. Discussed PFO closure with cardiology, they recommend outpatient consultation with Interventional Cardiology in setting of active MRSA infection and do not recommend inpatient closure.     MRSA abscess- s/p course of bactrim and I and D at outside hospital without improvement. Seen by Neurosurgery followed by General Surgery. I and D performed 2/10. Evaluated subsequently multiple times at patient request and recommended no further debridement. Multiple MRIs taken, reviewed with Radiology with no indication of osteo. Inflammatory markers repeatedly negative. ID consulted who recommended initial 7 days of Vancomycin with completion of 14 day course with Bactrim. Wound care followed patient throughout hospital stay but patient's ex wife performed most of the wound care herself at patient's request.     He will be discharged with follow up in Infectious Disease Clinic and with his PCP. Home Health will assist with wound care. Wound care clinic referral and Interventional Cardiology clinic referral also placed.       Patient not seen by me on day of discharge due to being out of his room (Frequently left room during hospital stay despite contact precautions). Evaluated by staff Dr. Campo on day of discharge.
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
## 2020-02-09 NOTE — ED TRIAGE NOTES
Patient presents with posterior head wound that has been draining and increasing in size. Was recently admitted for stroke 10 days ago and hit his head, fluid was +MRSA,  finished Bactrim yesterday. Also reports generalized body aches and headache, thinks he has the flu.

## 2020-02-09 NOTE — PROGRESS NOTES
IV Vancomycin Pharmacokinetic Initial Assessment & plan:     Intravenous vancomycin was initiated in the ED.  2000 mg given at 1200 on 02/09  followed by a maintenance dose of vancomycin 1750 mg IV every 12 hours  Desired empiric serum trough concentration is 15 to 20 mcg/mL  Draw vancomycin trough level 30 min prior to fourth dose on 02/10 at approximately 2330  Pharmacy will continue to follow and monitor vancomycin.      Please contact pharmacy at extension [phone] with any questions regarding this assessment.   Thank you for the consult,   Ava Flor       Patient brief summary:  Luke Coley is a 44 y.o. male initiated on antimicrobial therapy with IV Vancomycin for treatment of suspected Scalp wound infection     Drug Allergies:   Review of patient's allergies indicates:  No Known Allergies    Actual Body Weight:   97.5 kg    Renal Function:   Estimated Creatinine Clearance: 79.4 mL/min (based on SCr of 1.4 mg/dL).,     CBC (last 72 hours):  Recent Labs   Lab Result Units 02/09/20  1135   WBC K/uL 5.50   Hemoglobin g/dL 13.7*   Hematocrit % 39.1*   Platelets K/uL 308   Gran% % 67.6   Lymph% % 23.1   Mono% % 7.3   Eosinophil% % 1.1   Basophil% % 0.7   Differential Method  Automated       Metabolic Panel (last 72 hours):  Recent Labs   Lab Result Units 02/09/20  1135   Sodium mmol/L 139   Potassium mmol/L 4.5   Chloride mmol/L 105   CO2 mmol/L 23   Glucose mg/dL 105   BUN, Bld mg/dL 10   Creatinine mg/dL 1.4   Albumin g/dL 4.2   Total Bilirubin mg/dL 0.3   Alkaline Phosphatase U/L 78   AST U/L 24   ALT U/L 17       Drug levels (last 3 results):  No results for input(s): VANCOMYCINRA, VANCOMYCINPE, VANCOMYCINTR in the last 72 hours.    Microbiologic Results:  Microbiology Results (last 7 days)     Procedure Component Value Units Date/Time    Blood culture x two cultures. Draw prior to antibiotics. [[id number]] Collected:  02/09/20 1135    Order Status:  Sent Specimen:  Blood from Peripheral, Antecubital,  Left Updated:  02/09/20 1142    Blood culture x two cultures. Draw prior to antibiotics. [[id number]] Collected:  02/09/20 1135    Order Status:  Sent Specimen:  Blood from Peripheral, Hand, Left Updated:  02/09/20 1141

## 2020-02-09 NOTE — HPI
"The pt is a 43 yo M with a head abscess (cx positive for MRSA), recent ischemic stroke, PFO?, fibromyalgia, IBS, anxiety/depression, and prior history of SI that presents with a chief complaint of wound infection on the back of his head with associated HA. He reports that he was admitted to the hospital on 1/24-1/31 for a stroke which caused him to pass out and hit his head- which subsequently developed into an infection that grew MRSA. He states that he was placed on a 14 day course of Bactrim (which he finished yesterday) and is s/p I&D from  (5-6 days ago from presentation). Despite this, the abscess has gotten larger and he has lost more hair in that region. His ex wife (recent divorce) is an RN and took a look at the area- she advised him to go to the hospital for further evaluation. He reports that he has had chills, fatigue, nausea, HA, and diffuse body aches. He denies any focal weakness, sensory deficit, changes in vision, numbness/tingling, or hearing changes of recent. Describes the HA as a throbbing focused in the area of the abscess with radiation to the front of the head. He denies any current SI or HI. He does feel that he is depressed given his recent illnesses and divorce. He was an alcoholic- but he has since stopped for the last month. Recently completed rehab in [place].     In the ED- he was evaluated and found to be afebrile, WBC count of 5.5,  Lactate and procal wnl, and an MRI of the head- which was positive for "evolving right temporal-parieto-occipital subacute age infarct (likely watershed area of MCA/PCA) with interval development of moderate hemorrhage and moderate surrounding vasogenic edema.  No significant mass effect or midline shift. Additional evolving subacute age infarcts within the bilateral basal ganglia and periventricular white matter of the posterior right lateral ventricle." Of note a "small rim enhancing fluid collection overlying the posterosuperior midline calvarium, " "presumably an abscess given reported history.  No associated calvarial abnormalities."       .   "

## 2020-02-09 NOTE — ASSESSMENT & PLAN NOTE
The pt is a 43 yo M that presents for evaluation of a head abscess that is culture positive for MRSA- refractory to outpatient management with I&D at  and a 14 day course of Bactrim. He presents with associated HA and fatigue, but no other symptoms or neurologic deficits.     - Continue Vancomycin dosing for skin and soft tissue infections  - Consult to neurosurgery for possible I&D- will discuss with them in the am with formal consult  - Discussed case with general surgery- they are uncomfortable performing an I&D on this patient after reviewing imaging

## 2020-02-09 NOTE — ASSESSMENT & PLAN NOTE
- Vascular consult placed from ED- consult accepted- pending recs  - Neuro checks q4 hours ordered   - DC ASA

## 2020-02-09 NOTE — ED NOTES
Patient identifiers verified and correct for Luke Gonzalez 1975.   LOC: The patient is awake, alert and aware of environment with an appropriate affect, the patient is oriented x 3 and speaking appropriately.   APPEARANCE: Patient appears comfortable and in no acute distress, patient is clean and well groomed.  SKIN: The skin is warm and dry, color consistent with ethnicity, patient has normal skin turgor and moist mucus membranes, skin intact, no breakdown or bruising noted. EX: posterior head wound  MUSCULOSKELETAL: Patient moving all extremities spontaneously, no swelling noted. EX: generalized body aches.   RESPIRATORY: Airway is open and patent, respirations are spontaneous, patient has a normal effort and rate, no accessory muscle use noted, O2 sats noted at 99% on room air.  CARDIAC: Patient has a normal rate and regular rhythm, no edema noted, capillary refill < 3 seconds.   GASTRO: Soft and non tender to palpation, no distention noted, normoactive bowel sounds present in all four quadrants. Pt states bowel movements have been regular.  : Pt denies any pain or frequency with urination.  NEURO: Pt opens eyes spontaneously, behavior appropriate to situation, follows commands, facial expression symmetrical, bilateral hand grasp equal and even, purposeful motor response noted, normal sensation in all extremities when touched with a finger.

## 2020-02-10 LAB
ANION GAP SERPL CALC-SCNC: 11 MMOL/L (ref 8–16)
BASOPHILS # BLD AUTO: 0.05 K/UL (ref 0–0.2)
BASOPHILS NFR BLD: 0.5 % (ref 0–1.9)
BUN SERPL-MCNC: 10 MG/DL (ref 6–20)
CALCIUM SERPL-MCNC: 9 MG/DL (ref 8.7–10.5)
CHLORIDE SERPL-SCNC: 107 MMOL/L (ref 95–110)
CO2 SERPL-SCNC: 21 MMOL/L (ref 23–29)
CREAT SERPL-MCNC: 1.3 MG/DL (ref 0.5–1.4)
DIFFERENTIAL METHOD: ABNORMAL
EOSINOPHIL # BLD AUTO: 0.1 K/UL (ref 0–0.5)
EOSINOPHIL NFR BLD: 0.9 % (ref 0–8)
ERYTHROCYTE [DISTWIDTH] IN BLOOD BY AUTOMATED COUNT: 13 % (ref 11.5–14.5)
EST. GFR  (AFRICAN AMERICAN): >60 ML/MIN/1.73 M^2
EST. GFR  (NON AFRICAN AMERICAN): >60 ML/MIN/1.73 M^2
GLUCOSE SERPL-MCNC: 85 MG/DL (ref 70–110)
HCT VFR BLD AUTO: 37.4 % (ref 40–54)
HGB BLD-MCNC: 12.6 G/DL (ref 14–18)
IMM GRANULOCYTES # BLD AUTO: 0.04 K/UL (ref 0–0.04)
IMM GRANULOCYTES NFR BLD AUTO: 0.4 % (ref 0–0.5)
LYMPHOCYTES # BLD AUTO: 1.7 K/UL (ref 1–4.8)
LYMPHOCYTES NFR BLD: 18.7 % (ref 18–48)
MCH RBC QN AUTO: 31.9 PG (ref 27–31)
MCHC RBC AUTO-ENTMCNC: 33.7 G/DL (ref 32–36)
MCV RBC AUTO: 95 FL (ref 82–98)
MONOCYTES # BLD AUTO: 0.6 K/UL (ref 0.3–1)
MONOCYTES NFR BLD: 6.5 % (ref 4–15)
NEUTROPHILS # BLD AUTO: 6.8 K/UL (ref 1.8–7.7)
NEUTROPHILS NFR BLD: 73 % (ref 38–73)
NRBC BLD-RTO: 0 /100 WBC
PLATELET # BLD AUTO: 236 K/UL (ref 150–350)
PMV BLD AUTO: 10.4 FL (ref 9.2–12.9)
POTASSIUM SERPL-SCNC: 3.6 MMOL/L (ref 3.5–5.1)
RBC # BLD AUTO: 3.95 M/UL (ref 4.6–6.2)
SODIUM SERPL-SCNC: 139 MMOL/L (ref 136–145)
WBC # BLD AUTO: 9.25 K/UL (ref 3.9–12.7)

## 2020-02-10 PROCEDURE — 63600175 PHARM REV CODE 636 W HCPCS: Performed by: STUDENT IN AN ORGANIZED HEALTH CARE EDUCATION/TRAINING PROGRAM

## 2020-02-10 PROCEDURE — 11000001 HC ACUTE MED/SURG PRIVATE ROOM

## 2020-02-10 PROCEDURE — 25000003 PHARM REV CODE 250: Performed by: STUDENT IN AN ORGANIZED HEALTH CARE EDUCATION/TRAINING PROGRAM

## 2020-02-10 PROCEDURE — 25000003 PHARM REV CODE 250

## 2020-02-10 PROCEDURE — 85025 COMPLETE CBC W/AUTO DIFF WBC: CPT

## 2020-02-10 PROCEDURE — 94761 N-INVAS EAR/PLS OXIMETRY MLT: CPT

## 2020-02-10 PROCEDURE — 80202 ASSAY OF VANCOMYCIN: CPT

## 2020-02-10 PROCEDURE — 36415 COLL VENOUS BLD VENIPUNCTURE: CPT

## 2020-02-10 PROCEDURE — 99233 SBSQ HOSP IP/OBS HIGH 50: CPT | Mod: ,,, | Performed by: STUDENT IN AN ORGANIZED HEALTH CARE EDUCATION/TRAINING PROGRAM

## 2020-02-10 PROCEDURE — 99233 PR SUBSEQUENT HOSPITAL CARE,LEVL III: ICD-10-PCS | Mod: ,,, | Performed by: STUDENT IN AN ORGANIZED HEALTH CARE EDUCATION/TRAINING PROGRAM

## 2020-02-10 PROCEDURE — 80048 BASIC METABOLIC PNL TOTAL CA: CPT

## 2020-02-10 RX ORDER — LIDOCAINE HYDROCHLORIDE 10 MG/ML
INJECTION INFILTRATION; PERINEURAL
Status: COMPLETED
Start: 2020-02-10 | End: 2020-02-10

## 2020-02-10 RX ORDER — LIDOCAINE HYDROCHLORIDE 10 MG/ML
20 INJECTION INFILTRATION; PERINEURAL ONCE
Status: COMPLETED | OUTPATIENT
Start: 2020-02-10 | End: 2020-02-10

## 2020-02-10 RX ORDER — ASPIRIN 325 MG
325 TABLET ORAL DAILY
Status: DISCONTINUED | OUTPATIENT
Start: 2020-02-10 | End: 2020-02-10

## 2020-02-10 RX ORDER — PREGABALIN 200 MG/1
200 CAPSULE ORAL 3 TIMES DAILY
COMMUNITY

## 2020-02-10 RX ADMIN — ACETAMINOPHEN 650 MG: 325 TABLET ORAL at 02:02

## 2020-02-10 RX ADMIN — PREGABALIN 200 MG: 50 CAPSULE ORAL at 12:02

## 2020-02-10 RX ADMIN — PREGABALIN 200 MG: 50 CAPSULE ORAL at 09:02

## 2020-02-10 RX ADMIN — ATORVASTATIN CALCIUM 10 MG: 10 TABLET, FILM COATED ORAL at 08:02

## 2020-02-10 RX ADMIN — VANCOMYCIN HYDROCHLORIDE 1750 MG: 100 INJECTION, POWDER, LYOPHILIZED, FOR SOLUTION INTRAVENOUS at 12:02

## 2020-02-10 RX ADMIN — LIDOCAINE HYDROCHLORIDE 20 MG: 10 INJECTION, SOLUTION INFILTRATION; PERINEURAL at 03:02

## 2020-02-10 RX ADMIN — LIDOCAINE HYDROCHLORIDE 20 MG: 10 INJECTION INFILTRATION; PERINEURAL at 03:02

## 2020-02-10 RX ADMIN — ACETAMINOPHEN 650 MG: 325 TABLET ORAL at 08:02

## 2020-02-10 RX ADMIN — LIDOCAINE HYDROCHLORIDE 20 ML: 10 INJECTION INFILTRATION; PERINEURAL at 03:02

## 2020-02-10 RX ADMIN — LIDOCAINE HYDROCHLORIDE 20 ML: 10 INJECTION, SOLUTION INFILTRATION; PERINEURAL at 03:02

## 2020-02-10 RX ADMIN — MIRTAZAPINE 15 MG: 15 TABLET, FILM COATED ORAL at 09:02

## 2020-02-10 RX ADMIN — DIVALPROEX SODIUM 500 MG: 250 TABLET, DELAYED RELEASE ORAL at 08:02

## 2020-02-10 RX ADMIN — CLONIDINE 1 PATCH: 0.1 PATCH TRANSDERMAL at 11:02

## 2020-02-10 RX ADMIN — DIVALPROEX SODIUM 500 MG: 250 TABLET, DELAYED RELEASE ORAL at 09:02

## 2020-02-10 RX ADMIN — DULOXETINE HYDROCHLORIDE 60 MG: 60 CAPSULE, DELAYED RELEASE ORAL at 08:02

## 2020-02-10 RX ADMIN — ASPIRIN 325 MG ORAL TABLET 325 MG: 325 PILL ORAL at 08:02

## 2020-02-10 RX ADMIN — PREGABALIN 200 MG: 50 CAPSULE ORAL at 03:02

## 2020-02-10 RX ADMIN — ACETAMINOPHEN 650 MG: 325 TABLET ORAL at 03:02

## 2020-02-10 NOTE — ASSESSMENT & PLAN NOTE
Luke Coley is a 44 y.o. male w/ abscess on head    -Unroofed at bedside (see procedure note below)  -Antibiotics per primary  -Daily dressing change (1/2 inch packing is fine)    Procedure:  2 ml of 1% lidocaine injected into wound  11 blade used to excise eschar overlying wound  Eschar removed and purulence expressed  Packed with 1/2 inch gauze  Total abscess cavity was 2 x 2 cm

## 2020-02-10 NOTE — HPI
Luke Coley is a 44 y.o. male recently discharged from stroke primary service with RMCA stroke. Patient presented to the ED today c/o wound infection and headache. Patient with scalp abscess. Patient to be admitted to hospital medicine for treatment of abscess.  MRI completed in the ED with evolving infarct, stroke team consulted.
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
## 2020-02-10 NOTE — PLAN OF CARE
02/10/20 1335   Discharge Assessment   Assessment Type Discharge Planning Assessment   Confirmed/corrected address and phone number on facesheet? Yes   Assessment information obtained from? Patient   Expected Length of Stay (days) 2   Communicated expected length of stay with patient/caregiver yes   Prior to hospitilization cognitive status: Alert/Oriented   Prior to hospitalization functional status: Independent   Current cognitive status: Alert/Oriented   Current Functional Status: Independent   Lives With friend(s)  (friend, Oren Douglas ([phone]))   Able to Return to Prior Arrangements yes   Is patient able to care for self after discharge? Yes   Patient's perception of discharge disposition home or selfcare   Readmission Within the Last 30 Days current reason for admission unrelated to previous admission   If yes, most recent facility [facility]: [facility]   Patient currently being followed by outpatient case management? No   Patient currently receives any other outside agency services? No   Equipment Currently Used at Home none   Do you have any problems affording any of your prescribed medications? No   Is the patient taking medications as prescribed? yes   Does the patient have transportation home? Yes   Transportation Anticipated family or friend will provide   Does the patient receive services at the Coumadin Clinic? No   Discharge Plan A Home   Discharge Plan B Home Health   DME Needed Upon Discharge  other (see comments)  (tbd)   Patient/Family in Agreement with Plan yes   Readmission Questionnaire   At the time of your discharge, did someone talk to you about what your health problems were? Yes   At the time of discharge, did someone talk to you about what to watch out for regarding worsening of your health problem? Yes   At the time of discharge, did someone talk to you about what to do if you experienced worsening of your health problem? Yes   At the time of discharge, did someone talk to you about which  medication to take when you left the hospital and which ones to stop taking? Yes   At the time of discharge, did someone talk to you about when and where to follow up with a doctor after you left the hospital? Yes   What do you believe caused you to be sick enough to be re-admitted? wound infection   How often do you need to have someone help you when you read instructions, pamphlets, or other written material from your doctor or pharmacy? Never   Do you have problems taking your medications as prescribed? No   Do you have any problems affording any of  your prescribed medications? No   Do you have problems obtaining/receiving your medications? No   Does the patient have transportation to healthcare appointments? Yes   Living Arrangements house   Does the patient have family/friends to help with healtcare needs after discharge? yes   Does your caregiver provide all the help you need? I don't know     Dx: Head abscess  Consult: gen surg & wound care  Pharm: [facility]  Hosp f/u appt: Dr. Koko Stockton (PCP) on 2/17/2020 at 1311
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
## 2020-02-10 NOTE — ASSESSMENT & PLAN NOTE
Patient is a 44 y.o. man with medical history of HTN, fibromyalgia, alcohol abuse, suicidal ideation, elevated liver enzymes and bipolar disorder who presented to ED c/o wound infection and headache. MRI brain completed in ED with normal evolution of R MCA infarct. No new recommendations at this time. Patient to continue ASA for secondary stroke prevention. Stroke team will sign off, thank you for your consult. Please call stroke team with any questions or concerns.     Antithrombotics for secondary stroke prevention: Antiplatelets: Aspirin: 325 mg daily  Statins for secondary stroke prevention and hyperlipidemia, if present:   Statins: Atorvastatin- 10 mg daily.   Aggressive risk factor modification: HTN, Smoking, alcohol and drug use  Diagnostics ordered/pending: None   BP parameters: normotensive
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
## 2020-02-10 NOTE — HPI
Luke Coley is a 44 y.o. male w/ recent hx of CVA which caused him to have a mechanical fall resulting in scalp laceration presented to hospital with concern for wound infection.  He states that he just finished a 14 day course of bactrim and has had one I+D before of his scalp.  Medicine team has asked us to perform I+D.

## 2020-02-10 NOTE — PLAN OF CARE
Problem: Fall Injury Risk  Goal: Absence of Fall and Fall-Related Injury  Outcome: Ongoing, Progressing  Intervention: Identify and Manage Contributors to Fall Injury Risk  Flowsheets (Taken 2/10/2020 1733)  Self-Care Promotion: independence encouraged  Medication Review/Management: medications reviewed     Problem: Adult Inpatient Plan of Care  Goal: Plan of Care Review  Outcome: Ongoing, Progressing  Goal: Patient-Specific Goal (Individualization)  Outcome: Ongoing, Progressing  Goal: Absence of Hospital-Acquired Illness or Injury  Outcome: Ongoing, Progressing  Goal: Optimal Comfort and Wellbeing  Outcome: Ongoing, Progressing  Goal: Readiness for Transition of Care  Outcome: Ongoing, Progressing  Goal: Rounds/Family Conference  Outcome: Ongoing, Progressing     Problem: Infection  Goal: Infection Symptom Resolution  Outcome: Ongoing, Progressing  Intervention: Prevent or Manage Infection  Flowsheets (Taken 2/10/2020 1733)  Infection Management: aseptic technique maintained  Isolation Precautions: contact precautions maintained; contact precautions initiated     Problem: Wound  Goal: Optimal Wound Healing  Outcome: Ongoing, Progressing  Intervention: Promote Effective Wound Healing  Flowsheets (Taken 2/10/2020 1733)  Oral Nutrition Promotion: physical activity promoted; safe use of adaptive equipment encouraged  Pain Management Interventions: awakened for pain meds per patient request; pain management plan reviewed with patient/caregiver     Pt. c/o HA-managing with Tylenol.   Pt. continue on IV AbX-Vanc. ID is consulted.   Pt. had a bedside I & D on head/abscess.   Neurocheck WDL.   Fall safety and pt. care rounds maintained. Wctm.

## 2020-02-10 NOTE — ASSESSMENT & PLAN NOTE
The pt is a 43 yo M that presents for evaluation of a head abscess that is culture positive for MRSA- refractory to outpatient management with I&D at  and a 14 day course of Bactrim. He presents with associated HA and fatigue, but no other symptoms or neurologic deficits.     - Continue Vancomycin dosing for skin and soft tissue infections  - Neurosurgery and Radiology assured no bony involvement of abscess  - Gen surgery seeing patient for possible I&D

## 2020-02-10 NOTE — ASSESSMENT & PLAN NOTE
- Vascular consult placed from ED  - Vascular neurology reports [facility] imaging consistent with normal stroke evolution  - ASA will be restarted after head abscess I&D
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
## 2020-02-10 NOTE — PROGRESS NOTES
Consult received per MD for I&D of head.     Pt was admitted on 2/9/20with a wound infection on the scalp. Pt has a PMHx of abscess (cx positive for MRSA), recent ischemic stroke, PFO?, fibromyalgia, IBS, anxiety/depression, alcoholism (completed rehab in [place]),and prior history of SI. Chart reflects pt had received out patient management at [facility] and a 14 course of Bactrim prior to being admitted and has received Vancomycin dosing for skin and soft tissue infections.    Received pt lying in bed awake alert watching tv. Pt states he has been NPO since midnight,and pt's nurse reports that General Surgery has been consulted for a possible I & D.     Upon assessment of posterior head (present upon admission): dry stable eschar noted to center of wound area with fluctuance noted to the periphery without any active drainage noted at this time.Pt reports that area had been draining at home.    (see assessment and photo below)    Discussed findings with Dr.John Sellers with the attending Medical Team. At this time pt would benefit more from General Surgery evaluating pt for a possible I & D. Wound care did not apply anything topically at this time and will await recommendations from General Surgery. Dr. Sellers to re-consult wound care as needed. If General Surgery, does not recommend an I & D, wound care will re-evaluate pt,and  place recommendations for treatment    Recommendations:  -General Surgery to evaluate pt for I & D of abscess/ulceration to posterior head.    Plan of care discussed with pt, pt's nurse,and with . Wound care will continue to follow pt PRN. [id number]           02/10/20 1204        Wound 02/10/20 Abscess Head   Date First Assessed: 02/10/20   Pre-existing: Yes  Primary Wound Type: Abscess  Location: Head   Wound Image     Wound WDL ex   Dressing Appearance Open to air;No dressing   Drainage Amount None   Drainage Characteristics/Odor No odor   Appearance Eschar;Dry   Tissue  loss description   (unable to determine)   Periwound Area Swelling;Other (see comments)  (Fluctuance)   Wound Edges Defined   Wound Length (cm) 1 cm   Wound Width (cm) 1.5 cm   Wound Surface Area (cm^2) 1.5 cm^2   Care Cleansed with:;Sterile normal saline   Dressing Other (see comments)  (left open to air)   Periwound Care Dry periwound area maintained   Dressing Change Due   (pending Gen Surgery consult)

## 2020-02-11 LAB
ANION GAP SERPL CALC-SCNC: 9 MMOL/L (ref 8–16)
BASOPHILS # BLD AUTO: 0.04 K/UL (ref 0–0.2)
BASOPHILS NFR BLD: 0.9 % (ref 0–1.9)
BUN SERPL-MCNC: 10 MG/DL (ref 6–20)
CALCIUM SERPL-MCNC: 9.1 MG/DL (ref 8.7–10.5)
CHLORIDE SERPL-SCNC: 105 MMOL/L (ref 95–110)
CO2 SERPL-SCNC: 26 MMOL/L (ref 23–29)
CREAT SERPL-MCNC: 1.2 MG/DL (ref 0.5–1.4)
DIFFERENTIAL METHOD: ABNORMAL
EOSINOPHIL # BLD AUTO: 0.2 K/UL (ref 0–0.5)
EOSINOPHIL NFR BLD: 4.2 % (ref 0–8)
ERYTHROCYTE [DISTWIDTH] IN BLOOD BY AUTOMATED COUNT: 13 % (ref 11.5–14.5)
EST. GFR  (AFRICAN AMERICAN): >60 ML/MIN/1.73 M^2
EST. GFR  (NON AFRICAN AMERICAN): >60 ML/MIN/1.73 M^2
GLUCOSE SERPL-MCNC: 84 MG/DL (ref 70–110)
HCT VFR BLD AUTO: 38.5 % (ref 40–54)
HGB BLD-MCNC: 12.9 G/DL (ref 14–18)
IMM GRANULOCYTES # BLD AUTO: 0.01 K/UL (ref 0–0.04)
IMM GRANULOCYTES NFR BLD AUTO: 0.2 % (ref 0–0.5)
LYMPHOCYTES # BLD AUTO: 1.9 K/UL (ref 1–4.8)
LYMPHOCYTES NFR BLD: 40.6 % (ref 18–48)
MCH RBC QN AUTO: 31.4 PG (ref 27–31)
MCHC RBC AUTO-ENTMCNC: 33.5 G/DL (ref 32–36)
MCV RBC AUTO: 94 FL (ref 82–98)
MONOCYTES # BLD AUTO: 0.6 K/UL (ref 0.3–1)
MONOCYTES NFR BLD: 12.5 % (ref 4–15)
NEUTROPHILS # BLD AUTO: 1.9 K/UL (ref 1.8–7.7)
NEUTROPHILS NFR BLD: 41.6 % (ref 38–73)
NRBC BLD-RTO: 0 /100 WBC
PLATELET # BLD AUTO: 232 K/UL (ref 150–350)
PMV BLD AUTO: 10.3 FL (ref 9.2–12.9)
POTASSIUM SERPL-SCNC: 4.3 MMOL/L (ref 3.5–5.1)
RBC # BLD AUTO: 4.11 M/UL (ref 4.6–6.2)
SODIUM SERPL-SCNC: 140 MMOL/L (ref 136–145)
VANCOMYCIN TROUGH SERPL-MCNC: 12.3 UG/ML (ref 10–22)
WBC # BLD AUTO: 4.56 K/UL (ref 3.9–12.7)

## 2020-02-11 PROCEDURE — 25000003 PHARM REV CODE 250: Performed by: STUDENT IN AN ORGANIZED HEALTH CARE EDUCATION/TRAINING PROGRAM

## 2020-02-11 PROCEDURE — 63600175 PHARM REV CODE 636 W HCPCS: Performed by: STUDENT IN AN ORGANIZED HEALTH CARE EDUCATION/TRAINING PROGRAM

## 2020-02-11 PROCEDURE — 99223 PR INITIAL HOSPITAL CARE,LEVL III: ICD-10-PCS | Mod: ,,, | Performed by: PHYSICIAN ASSISTANT

## 2020-02-11 PROCEDURE — 99233 SBSQ HOSP IP/OBS HIGH 50: CPT | Mod: ,,, | Performed by: STUDENT IN AN ORGANIZED HEALTH CARE EDUCATION/TRAINING PROGRAM

## 2020-02-11 PROCEDURE — 36415 COLL VENOUS BLD VENIPUNCTURE: CPT

## 2020-02-11 PROCEDURE — 85025 COMPLETE CBC W/AUTO DIFF WBC: CPT

## 2020-02-11 PROCEDURE — 11000001 HC ACUTE MED/SURG PRIVATE ROOM

## 2020-02-11 PROCEDURE — 80048 BASIC METABOLIC PNL TOTAL CA: CPT

## 2020-02-11 PROCEDURE — 99233 PR SUBSEQUENT HOSPITAL CARE,LEVL III: ICD-10-PCS | Mod: ,,, | Performed by: STUDENT IN AN ORGANIZED HEALTH CARE EDUCATION/TRAINING PROGRAM

## 2020-02-11 PROCEDURE — 99223 1ST HOSP IP/OBS HIGH 75: CPT | Mod: ,,, | Performed by: PHYSICIAN ASSISTANT

## 2020-02-11 RX ORDER — ASPIRIN 325 MG
325 TABLET ORAL DAILY
Status: DISCONTINUED | OUTPATIENT
Start: 2020-02-11 | End: 2020-02-17 | Stop reason: HOSPADM

## 2020-02-11 RX ADMIN — VANCOMYCIN HYDROCHLORIDE 1750 MG: 100 INJECTION, POWDER, LYOPHILIZED, FOR SOLUTION INTRAVENOUS at 01:02

## 2020-02-11 RX ADMIN — DIVALPROEX SODIUM 500 MG: 250 TABLET, DELAYED RELEASE ORAL at 09:02

## 2020-02-11 RX ADMIN — VANCOMYCIN HYDROCHLORIDE 1750 MG: 100 INJECTION, POWDER, LYOPHILIZED, FOR SOLUTION INTRAVENOUS at 12:02

## 2020-02-11 RX ADMIN — ATORVASTATIN CALCIUM 10 MG: 10 TABLET, FILM COATED ORAL at 08:02

## 2020-02-11 RX ADMIN — DIVALPROEX SODIUM 500 MG: 250 TABLET, DELAYED RELEASE ORAL at 08:02

## 2020-02-11 RX ADMIN — MIRTAZAPINE 15 MG: 15 TABLET, FILM COATED ORAL at 09:02

## 2020-02-11 RX ADMIN — PREGABALIN 200 MG: 50 CAPSULE ORAL at 03:02

## 2020-02-11 RX ADMIN — PREGABALIN 200 MG: 50 CAPSULE ORAL at 08:02

## 2020-02-11 RX ADMIN — PREGABALIN 200 MG: 50 CAPSULE ORAL at 09:02

## 2020-02-11 RX ADMIN — VANCOMYCIN HYDROCHLORIDE 1750 MG: 100 INJECTION, POWDER, LYOPHILIZED, FOR SOLUTION INTRAVENOUS at 11:02

## 2020-02-11 RX ADMIN — DULOXETINE HYDROCHLORIDE 60 MG: 60 CAPSULE, DELAYED RELEASE ORAL at 08:02

## 2020-02-11 RX ADMIN — ASPIRIN 325 MG ORAL TABLET 325 MG: 325 PILL ORAL at 09:02

## 2020-02-11 NOTE — HPI
"The pt is a 43 yo M with a head abscess (cx positive for MRSA), recent ischemic stroke, PFO?, fibromyalgia, IBS, anxiety/depression, and prior history of SI that presents with a chief complaint of wound infection on the back of his head with associated HA. He reports that he was admitted to the hospital on 1/24-1/31 for a stroke which caused him to pass out and hit his head- which subsequently developed into an infection that grew MRSA. He states that he was placed on a 14 day course of Bactrim (which he finished yesterday) and is s/p I&D from  (5-6 days ago from presentation). Despite this, the abscess has gotten larger and he has lost more hair in that region. His ex wife (recent divorce) is an RN and took a look at the area- she advised him to go to the hospital for further evaluation. He reports that he has had chills, fatigue, nausea, HA, and diffuse body aches. He denies any focal weakness, sensory deficit, changes in vision, numbness/tingling, or hearing changes of recent. Describes the HA as a throbbing focused in the area of the abscess with radiation to the front of the head. He denies any current SI or HI. He does feel that he is depressed given his recent illnesses and divorce. He was an alcoholic- but he has since stopped for the last month. Recently completed rehab in [place].      In the ED- he was evaluated and found to be afebrile, WBC count of 5.5,  Lactate and procal wnl, and an MRI of the head- which was positive for "evolving right temporal-parieto-occipital subacute age infarct (likely watershed area of MCA/PCA) with interval development of moderate hemorrhage and moderate surrounding vasogenic edema.  No significant mass effect or midline shift. Additional evolving subacute age infarcts within the bilateral basal ganglia and periventricular white matter of the posterior right lateral ventricle." Of note a "small rim enhancing fluid collection overlying the posterosuperior midline calvarium, " "presumably an abscess given reported history.  No associated calvarial abnormalities."     Patient was seen in the ED by Gen Fitzgerald and had I&D.  No cultures sent.  He has been placed on Vancomycin.  Blood cultures are NGTD.  He has been afebrile without leukocytosis.  .He reports fever, chills, sweats, N/D, HA.    "

## 2020-02-11 NOTE — ASSESSMENT & PLAN NOTE
The pt is a 45 yo M that presents for evaluation of a head abscess that is culture positive for MRSA- refractory to outpatient management with I&D at  and a 14 day course of Bactrim. He presents with associated HA and fatigue, but no other symptoms or neurologic deficits.     - Continue Vancomycin dosing for skin and soft tissue infections  - Neurosurgery and Radiology assured no bony involvement of abscess  - Gen surgery performed I&D 2/10  - ID consulted for final recs regarding abx prior to dc
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
## 2020-02-11 NOTE — PLAN OF CARE
CM met with the patient at his bedside as requested to complete POA paperwork naming the patient's x-spouse Leidy Coley ([phone]) as the patient's POA. Original form placed in the patient's chart & 2 copies given to the patient. Will continue to follow.

## 2020-02-11 NOTE — PLAN OF CARE
02/11/20 1034   Post-Acute Status   Post-Acute Authorization Other   Other Status No Post-Acute Service Needs     Pt DC today home with no needs.     Edith Estrada LMSW   Licensed Master Social Worker

## 2020-02-11 NOTE — CONSULTS
Pharmacokinetic Assessment Follow Up: IV Vancomycin    Vancomycin serum concentration assessment(s):    The trough level was drawn correctly and can be used to guide therapy at this time. The measurement is within the desired definitive target range of 10 to 20 mcg/mL.    Vancomycin Regimen Plan:    Continue regimen to Vancomycin 1750 mg IV every 12 hours with next serum trough concentration measured at 11 am prior to dose on 2/14    Drug levels (last 3 results):  Recent Labs   Lab Result Units 02/10/20  2348   Vancomycin-Trough ug/mL 12.3       Pharmacy will continue to follow and monitor vancomycin.    Please contact pharmacy at extension [phone] for questions regarding this assessment.    Thank you for the consult,   Leidy Ibrahim       Patient brief summary:  Luke Coley is a 44 y.o. male initiated on antimicrobial therapy with IV Vancomycin for treatment of skin & soft tissue infection    Drug Allergies:   Review of patient's allergies indicates:  No Known Allergies    Actual Body Weight:   97.5 kg    Renal Function:   Estimated Creatinine Clearance: 92.6 mL/min (based on SCr of 1.2 mg/dL).,     Dialysis Method (if applicable):  N/A    CBC (last 72 hours):  Recent Labs   Lab Result Units 02/09/20  1135 02/10/20  0711 02/11/20  0605   WBC K/uL 5.50 9.25 4.56   Hemoglobin g/dL 13.7* 12.6* 12.9*   Hematocrit % 39.1* 37.4* 38.5*   Platelets K/uL 308 236 232   Gran% % 67.6 73.0 41.6   Lymph% % 23.1 18.7 40.6   Mono% % 7.3 6.5 12.5   Eosinophil% % 1.1 0.9 4.2   Basophil% % 0.7 0.5 0.9   Differential Method  Automated Automated Automated       Metabolic Panel (last 72 hours):  Recent Labs   Lab Result Units 02/09/20  1135 02/10/20  0711 02/11/20  0605   Sodium mmol/L 139 139 140   Potassium mmol/L 4.5 3.6 4.3   Chloride mmol/L 105 107 105   CO2 mmol/L 23 21* 26   Glucose mg/dL 105 85 84   BUN, Bld mg/dL 10 10 10   Creatinine mg/dL 1.4 1.3 1.2   Albumin g/dL 4.2  --   --    Total Bilirubin mg/dL 0.3  --   --     Alkaline Phosphatase U/L 78  --   --    AST U/L 24  --   --    ALT U/L 17  --   --        Vancomycin Administrations:  vancomycin given in the last 96 hours                   vancomycin 1750 mg in 0.9% sodium chloride 500 mL IVPB (mg) 1,750 mg New Bag 02/11/20 0113     1,750 mg New Bag 02/10/20 1226     1,750 mg New Bag  0003    vancomycin 2 g in 0.9% sodium chloride 500 mL IVPB (mg) 2,000 mg New Bag 02/09/20 1200                Microbiologic Results:  Microbiology Results (last 7 days)     Procedure Component Value Units Date/Time    Blood culture x two cultures. Draw prior to antibiotics. [[id number]] Collected:  02/09/20 1135    Order Status:  Completed Specimen:  Blood from Peripheral, Antecubital, Left Updated:  02/10/20 1412     Blood Culture, Routine No Growth to date      No Growth to date    Narrative:       Aerobic and anaerobic    Blood culture x two cultures. Draw prior to antibiotics. [[id number]] Collected:  02/09/20 1135    Order Status:  Completed Specimen:  Blood from Peripheral, Hand, Left Updated:  02/10/20 1412     Blood Culture, Routine No Growth to date      No Growth to date    Narrative:       Aerobic and anaerobic
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
## 2020-02-11 NOTE — CONSULTS
[facility]  Infectious Disease  Consult Note    Patient Name: Luke Coley  MRN: [id number]  Admission Date: 2/9/2020  Hospital Length of Stay: 2 days  Attending Physician: Aldair Campo MD  Primary Care Provider: Koko Stockton Jr, MD         Inpatient consult to Infectious Diseases  Consult performed by: DARIN Yoder Jr.  Consult ordered by: Abimael Sellers DO      Consult received.  Full consult to follow.    DARIN Vila  Infectious Disease  [facility]

## 2020-02-11 NOTE — PLAN OF CARE
CM was informed by [facility] that they are awaiting final ID recommendations prior to discharge. Patient may need IV abx. CM informed IKER Sharma of above. Will continue to follow.

## 2020-02-12 PROBLEM — F19.11 HISTORY OF DRUG ABUSE: Status: ACTIVE | Noted: 2020-02-12

## 2020-02-12 LAB
ANION GAP SERPL CALC-SCNC: 9 MMOL/L (ref 8–16)
BASOPHILS # BLD AUTO: 0.06 K/UL (ref 0–0.2)
BASOPHILS NFR BLD: 0.9 % (ref 0–1.9)
BUN SERPL-MCNC: 11 MG/DL (ref 6–20)
CALCIUM SERPL-MCNC: 9.4 MG/DL (ref 8.7–10.5)
CHLORIDE SERPL-SCNC: 107 MMOL/L (ref 95–110)
CO2 SERPL-SCNC: 25 MMOL/L (ref 23–29)
CREAT SERPL-MCNC: 1.1 MG/DL (ref 0.5–1.4)
DIFFERENTIAL METHOD: ABNORMAL
EOSINOPHIL # BLD AUTO: 0.2 K/UL (ref 0–0.5)
EOSINOPHIL NFR BLD: 2.6 % (ref 0–8)
ERYTHROCYTE [DISTWIDTH] IN BLOOD BY AUTOMATED COUNT: 13 % (ref 11.5–14.5)
EST. GFR  (AFRICAN AMERICAN): >60 ML/MIN/1.73 M^2
EST. GFR  (NON AFRICAN AMERICAN): >60 ML/MIN/1.73 M^2
GLUCOSE SERPL-MCNC: 77 MG/DL (ref 70–110)
HCT VFR BLD AUTO: 38.8 % (ref 40–54)
HGB BLD-MCNC: 13.3 G/DL (ref 14–18)
IMM GRANULOCYTES # BLD AUTO: 0.05 K/UL (ref 0–0.04)
IMM GRANULOCYTES NFR BLD AUTO: 0.7 % (ref 0–0.5)
LYMPHOCYTES # BLD AUTO: 2 K/UL (ref 1–4.8)
LYMPHOCYTES NFR BLD: 28.4 % (ref 18–48)
MCH RBC QN AUTO: 32 PG (ref 27–31)
MCHC RBC AUTO-ENTMCNC: 34.3 G/DL (ref 32–36)
MCV RBC AUTO: 93 FL (ref 82–98)
MONOCYTES # BLD AUTO: 0.7 K/UL (ref 0.3–1)
MONOCYTES NFR BLD: 9.9 % (ref 4–15)
NEUTROPHILS # BLD AUTO: 4 K/UL (ref 1.8–7.7)
NEUTROPHILS NFR BLD: 57.5 % (ref 38–73)
NRBC BLD-RTO: 0 /100 WBC
PLATELET # BLD AUTO: 259 K/UL (ref 150–350)
PMV BLD AUTO: 10.5 FL (ref 9.2–12.9)
POTASSIUM SERPL-SCNC: 4.6 MMOL/L (ref 3.5–5.1)
RBC # BLD AUTO: 4.16 M/UL (ref 4.6–6.2)
SODIUM SERPL-SCNC: 141 MMOL/L (ref 136–145)
WBC # BLD AUTO: 6.89 K/UL (ref 3.9–12.7)

## 2020-02-12 PROCEDURE — 63600175 PHARM REV CODE 636 W HCPCS: Performed by: STUDENT IN AN ORGANIZED HEALTH CARE EDUCATION/TRAINING PROGRAM

## 2020-02-12 PROCEDURE — 99233 PR SUBSEQUENT HOSPITAL CARE,LEVL III: ICD-10-PCS | Mod: ,,, | Performed by: STUDENT IN AN ORGANIZED HEALTH CARE EDUCATION/TRAINING PROGRAM

## 2020-02-12 PROCEDURE — 99253 IP/OBS CNSLTJ NEW/EST LOW 45: CPT | Mod: ,,, | Performed by: INTERNAL MEDICINE

## 2020-02-12 PROCEDURE — 25000003 PHARM REV CODE 250: Performed by: STUDENT IN AN ORGANIZED HEALTH CARE EDUCATION/TRAINING PROGRAM

## 2020-02-12 PROCEDURE — 11000001 HC ACUTE MED/SURG PRIVATE ROOM

## 2020-02-12 PROCEDURE — 99253 PR INITIAL INPATIENT CONSULT,LEVL III: ICD-10-PCS | Mod: ,,, | Performed by: INTERNAL MEDICINE

## 2020-02-12 PROCEDURE — 94761 N-INVAS EAR/PLS OXIMETRY MLT: CPT

## 2020-02-12 PROCEDURE — 99233 SBSQ HOSP IP/OBS HIGH 50: CPT | Mod: ,,, | Performed by: STUDENT IN AN ORGANIZED HEALTH CARE EDUCATION/TRAINING PROGRAM

## 2020-02-12 PROCEDURE — 80048 BASIC METABOLIC PNL TOTAL CA: CPT

## 2020-02-12 PROCEDURE — 85025 COMPLETE CBC W/AUTO DIFF WBC: CPT

## 2020-02-12 PROCEDURE — 36415 COLL VENOUS BLD VENIPUNCTURE: CPT

## 2020-02-12 RX ORDER — ENOXAPARIN SODIUM 100 MG/ML
40 INJECTION SUBCUTANEOUS EVERY 24 HOURS
Status: DISCONTINUED | OUTPATIENT
Start: 2020-02-12 | End: 2020-02-13

## 2020-02-12 RX ADMIN — DIVALPROEX SODIUM 500 MG: 250 TABLET, DELAYED RELEASE ORAL at 10:02

## 2020-02-12 RX ADMIN — PREGABALIN 200 MG: 50 CAPSULE ORAL at 06:02

## 2020-02-12 RX ADMIN — MIRTAZAPINE 15 MG: 15 TABLET, FILM COATED ORAL at 08:02

## 2020-02-12 RX ADMIN — ASPIRIN 325 MG ORAL TABLET 325 MG: 325 PILL ORAL at 10:02

## 2020-02-12 RX ADMIN — DULOXETINE HYDROCHLORIDE 60 MG: 60 CAPSULE, DELAYED RELEASE ORAL at 10:02

## 2020-02-12 RX ADMIN — DIVALPROEX SODIUM 500 MG: 250 TABLET, DELAYED RELEASE ORAL at 08:02

## 2020-02-12 RX ADMIN — VANCOMYCIN HYDROCHLORIDE 1750 MG: 100 INJECTION, POWDER, LYOPHILIZED, FOR SOLUTION INTRAVENOUS at 06:02

## 2020-02-12 RX ADMIN — ATORVASTATIN CALCIUM 10 MG: 10 TABLET, FILM COATED ORAL at 10:02

## 2020-02-12 RX ADMIN — PREGABALIN 200 MG: 50 CAPSULE ORAL at 10:02

## 2020-02-12 RX ADMIN — ENOXAPARIN SODIUM 40 MG: 100 INJECTION SUBCUTANEOUS at 06:02

## 2020-02-12 NOTE — PROGRESS NOTES
Patient seen for wound care follow up s/p I&D by general surgery.  See flowsheet below for wound documentation.    Patient reports that he would not have anyone to perform daily packings.    Recommendations:  Patient would require Home health services to assist with packing/dressing changes.  MWF: irrigate with sterile normal saline, cavilon no sting barrier to periwound, pack with Aquacel Ag packing strip, cover with dry gauze and tegaderm.  Patient can shower prior to home health services/packing changes.  Recommend two week follow up in wound care clinic post discharge.    Answered patient's questions.  Discussed recommendations with Dr. Campo.    Nursing to continue care. Wound car to follow prn.       02/12/20 1050        Wound 02/10/20 Abscess Head   Date First Assessed: 02/10/20   Pre-existing: Yes  Primary Wound Type: Abscess  Location: Head   Wound Image    Wound WDL ex   Drainage Amount Scant   Drainage Characteristics/Odor Serous;No odor   Appearance Moist;Red;Adipose  (scant slough noted)   Periwound Area Intact   Wound Edges Open   Wound Length (cm) 1 cm   Wound Width (cm) 1 cm   Wound Depth (cm) 0.8 cm   Wound Volume (cm^3) 0.8 cm^3   Wound Surface Area (cm^2) 1 cm^2   Undermining (depth (cm)/location)   (approximately 0.5cm circumference undermining)   Care Cleansed with:;Sterile normal saline   Dressing Changed   Packing   (aquacel ag packing strip)   Periwound Care Skin barrier film applied   Dressing Change Due 02/14/20
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
## 2020-02-12 NOTE — ASSESSMENT & PLAN NOTE
The pt is a 45 yo M that presents for evaluation of a head abscess that is culture positive for MRSA- refractory to outpatient management with I&D at  and a 14 day course of Bactrim. He presents with associated HA and fatigue, but no other symptoms or neurologic deficits.     - Continue Vancomycin dosing for skin and soft tissue infections, appreciate ID recommendations  - Discussed with Neurosurgery and Radiology who reviewed imaging (Neurosurgery also evaluated patient on admission) and assured no bony involvement of abscess  - Gen surgery performed I&D 2/10  Wound care following for dressing management

## 2020-02-12 NOTE — ASSESSMENT & PLAN NOTE
44 year old with recent ischemic CVA who presented with head abscess after a fall.  Cultures of 1/28 during most recent hospitalization were + for MRSA.   [facility].  MRI on admit showed abscess over posterosuperior midline calvarium.  He underwent repeat bedside I&D on 2/10.  No cultures sent.  Blood cultures of 2/9 negative.   Recurrence possible related to need for further source control which has now been achieved.       Afebrile.  Wound Care following.  Day 4 of IV vancomycin after I&D.  Inflammatory markers wnl.     Plan:  1.  Continue IV vancomycin through Saturday 2/15 (5 days of IV) then transition to oral Bactrim DS, one table twice daily to complete 14 days of antibiotics from date of most recent I&D.  End date 2/24.  Maintain trough 15-20.  2.  CMP in am. Check creatinine - ensure no dose adjustments needed for Bactrim  3.  Nasal mupirocin - apply to each nares twice a day X 5 days for decolonization (ordered)  4.  Wound Care per Wound Care recommendations.  5.  ID follow up at end of therapy.  ID will make the appointment.  If no appointment scheduled prior to discharge, please send Epic message to ID charge nurse Alma Willams.  Patient also given my office contact information.   6.  Will sign off.  Please call or re-consult as needed.     Data reviewed and plan discussed with ID staff  Discussed with Primary Team

## 2020-02-12 NOTE — ASSESSMENT & PLAN NOTE
- Vascular consult placed from ED  - Vascular neurology reports [facility] imaging consistent with normal stroke evolution  - ASA continued per their recommendations.

## 2020-02-12 NOTE — PLAN OF CARE
Problem: Fall Injury Risk  Goal: Absence of Fall and Fall-Related Injury  Outcome: Ongoing, Progressing  Intervention: Identify and Manage Contributors to Fall Injury Risk  Flowsheets (Taken 2/11/2020 1850)  Self-Care Promotion: independence encouraged  Medication Review/Management: medications reviewed; high risk medications identified  Intervention: Promote Injury-Free Environment  Flowsheets (Taken 2/11/2020 1850)  Safety Promotion/Fall Prevention: assistive device/personal item within reach; side rails raised x 2  Environmental Safety Modification: assistive device/personal items within reach; clutter free environment maintained; lighting adjusted     Problem: Adult Inpatient Plan of Care  Goal: Plan of Care Review  Outcome: Ongoing, Progressing  Flowsheets (Taken 2/11/2020 1850)  Plan of Care Reviewed With: patient; spouse  Goal: Patient-Specific Goal (Individualization)  Outcome: Ongoing, Progressing  Flowsheets (Taken 2/11/2020 1850)  Individualized Care Needs: none   Anxieties, Fears or Concerns: none   Goal: Absence of Hospital-Acquired Illness or Injury  Outcome: Ongoing, Progressing  Intervention: Identify and Manage Fall Risk  Flowsheets (Taken 2/11/2020 1850)  Safety Promotion/Fall Prevention: assistive device/personal item within reach; side rails raised x 2  Intervention: Prevent VTE (venous thromboembolism)  Flowsheets (Taken 2/11/2020 1850)  VTE Prevention/Management: ambulation promoted  Goal: Optimal Comfort and Wellbeing  Outcome: Ongoing, Progressing  Intervention: Provide Person-Centered Care  Flowsheets (Taken 2/11/2020 1850)  Trust Relationship/Rapport: care explained; choices provided; emotional support provided; empathic listening provided; questions answered  Goal: Readiness for Transition of Care  Outcome: Ongoing, Progressing  Goal: Rounds/Family Conference  Outcome: Ongoing, Progressing     Problem: Infection  Goal: Infection Symptom Resolution  Outcome: Ongoing,  Progressing  Intervention: Prevent or Manage Infection  Flowsheets (Taken 2/11/2020 1850)  Fever Reduction/Comfort Measures: lightweight clothing  Infection Management: aseptic technique maintained  Isolation Precautions: contact precautions maintained     Problem: Wound  Goal: Optimal Wound Healing  Outcome: Ongoing, Progressing  Intervention: Promote Effective Wound Healing  Flowsheets (Taken 2/11/2020 1850)  Oral Nutrition Promotion: physical activity promoted     Pt is Aox4. Respirations even and unlabored. Pt will continue IV antibiotics at this time. Consult orders placed for wound care and dressing changes. Updated pt and family on plan of care. Pt verbalized understanding.
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
## 2020-02-12 NOTE — PLAN OF CARE
GENERAL SURGERY     S/p bedside I&D of scalp abscess on 2/10/20. Wound appears healthy and healing appropriately on images captured by wound care. Recommend continued daily packing changes per wound care. No further debridement needed.         Ernie Smallwood MD  General Surgery PGYIII  [phone]

## 2020-02-12 NOTE — ASSESSMENT & PLAN NOTE
Head wound s/p trauma with MRSA on culture, failed to respond to bactrim, despite recent I&D at OSH before finishing abx now with abscess on MRI and re-I&D.  Stable non spetic.  On vanc.  MRSA on culture sensitive to Vanc and Bactrim only.  Recurrence may related to source control and all loculations not opened at OSH I&D, could be reason why did not respond.    Plan:  - Rec continue vanc for 1-2d then DC on Bactrim or Zyvox  - stable non spetic  - will follow

## 2020-02-12 NOTE — PROGRESS NOTES
"[facility] Hospital Medicine  Progress Note    Patient Name: Luke Coley  MRN: [id number]  Patient Class: IP- Inpatient   Admission Date: 2/9/2020  Length of Stay: 3 days  Attending Physician: Aldair Campo MD  Primary Care Provider: Koko Stockton Jr, MD    [facility] Medicine Team: [facility] 5 Joshua Negro MD    Subjective:     Principal Problem:Abscess        HPI:  The pt is a 45 yo M with a head abscess (cx positive for MRSA), recent ischemic stroke, PFO?, fibromyalgia, IBS, anxiety/depression, and prior history of SI that presents with a chief complaint of wound infection on the back of his head with associated HA. He reports that he was admitted to the hospital on 1/24-1/31 for a stroke which caused him to pass out and hit his head- which subsequently developed into an infection that grew MRSA. He states that he was placed on a 14 day course of Bactrim (which he finished yesterday) and is s/p I&D from  (5-6 days ago from presentation). Despite this, the abscess has gotten larger and he has lost more hair in that region. His ex wife (recent divorce) is an RN and took a look at the area- she advised him to go to the hospital for further evaluation. He reports that he has had chills, fatigue, nausea, HA, and diffuse body aches. He denies any focal weakness, sensory deficit, changes in vision, numbness/tingling, or hearing changes of recent. Describes the HA as a throbbing focused in the area of the abscess with radiation to the front of the head. He denies any current SI or HI. He does feel that he is depressed given his recent illnesses and divorce. He was an alcoholic- but he has since stopped for the last month. Recently completed rehab in [place].     In the ED- he was evaluated and found to be afebrile, WBC count of 5.5,  Lactate and procal wnl, and an MRI of the head- which was positive for "evolving right temporal-parieto-occipital subacute age infarct (likely " "watershed area of MCA/PCA) with interval development of moderate hemorrhage and moderate surrounding vasogenic edema.  No significant mass effect or midline shift. Additional evolving subacute age infarcts within the bilateral basal ganglia and periventricular white matter of the posterior right lateral ventricle." Of note a "small rim enhancing fluid collection overlying the posterosuperior midline calvarium, presumably an abscess given reported history.  No associated calvarial abnormalities."       .     Overview/Hospital Course:  The pt was admitted to hospital medicine on 02/09/2020 for MRSA head abscess and recent ischemic stroke with hemorrhagic conversion in the right temporal perieto- occipital region. Plan to continue IV vancomycin. General surgery consulted I&D performed 2/10. ID consulted for recs    Interval History:   Discussed case with General Surgery team who evaluated the patient's wound today. No need for washout or debridement per them. Wound care following for dressing changes, seen today.     ID following for abx recommendations. Continuing on Vancomycin for now.     No neuro deficits    Discussed with cardiology who recommended against PFO closure in the setting of MRSA infection, can be scheduled as outpatient.     Review of Systems   Constitutional: Negative for chills and fever.   Eyes: Negative for visual disturbance.   Respiratory: Negative for cough and shortness of breath.    Cardiovascular: Negative for chest pain and leg swelling.   Gastrointestinal: Negative for constipation and diarrhea.   Genitourinary: Negative for dysuria and hematuria.   Skin: Positive for wound.     Objective:     Vital Signs (Most Recent):  Temp: 98.8 °F (37.1 °C) (02/12/20 0352)  Pulse: 70 (02/12/20 1030)  Resp: 17 (02/12/20 1030)  BP: 131/72 (02/12/20 0352)  SpO2: 96 % (02/12/20 1030) Vital Signs (24h Range):  Temp:  [97.9 °F (36.6 °C)-98.8 °F (37.1 °C)] 98.8 °F (37.1 °C)  Pulse:  [67-75] 70  Resp:  [17-18] " 17  SpO2:  [94 %-96 %] 96 %  BP: (117-131)/(72-87) 131/72     Weight: 97.5 kg (215 lb)  Body mass index is 29.99 kg/m².    Intake/Output Summary (Last 24 hours) at 2/12/2020 1712  Last data filed at 2/12/2020 0900  Gross per 24 hour   Intake 0 ml   Output --   Net 0 ml      Physical Exam   Constitutional:   Well appearing, resting comfortably in no distress   Cardiovascular: Normal rate and regular rhythm.   Pulmonary/Chest: Effort normal and breath sounds normal. No respiratory distress.   Abdominal: Soft. Bowel sounds are normal. There is no tenderness.   Musculoskeletal: He exhibits no edema or tenderness.   Neurological:   Awake and alert   Skin:   Abscess to crown of head with packing in place. Clean and dry. No surrounding erythema.    Nursing note and vitals reviewed.      Significant Labs: All pertinent labs within the past 24 hours have been reviewed.      Assessment/Plan:      * Abscess of the Head- MRSA positive   The pt is a 43 yo M that presents for evaluation of a head abscess that is culture positive for MRSA- refractory to outpatient management with I&D at  and a 14 day course of Bactrim. He presents with associated HA and fatigue, but no other symptoms or neurologic deficits.     - Continue Vancomycin dosing for skin and soft tissue infections, appreciate ID recommendations  - Discussed with Neurosurgery and Radiology who reviewed imaging (Neurosurgery also evaluated patient on admission) and assured no bony involvement of abscess  - Gen surgery performed I&D 2/10  Wound care following for dressing management      Fibromyalgia  - Continue home meds  - patient home lyrica restarted      HLD (hyperlipidemia)  - Continue home meds        History of Seizure  - Continue home Depakote       Ischemic stroke with subsequent hemmorhagic conversion   - Vascular consult placed from ED  - Vascular neurology reports [facility] imaging consistent with normal stroke evolution  - ASA continued per their  recommendations.       Tobacco use disorder  - Counseling for nicotine cessation with respiratory therapy placed       Chronic pain syndrome  - Continue home meds        Generalized anxiety disorder  - Continue home meds        VTE Risk Mitigation (From admission, onward)         Ordered     enoxaparin injection 40 mg  Daily      02/12/20 1721     IP VTE LOW RISK PATIENT  Once      02/09/20 1454     Place sequential compression device  Until discontinued      02/09/20 1454                      Joshua Negro MD  Department of Hospital Medicine   [facility]

## 2020-02-12 NOTE — PLAN OF CARE
Attempted X 3 to see patient throughout the day but not in his room  Reviewed chart.  Afebrile. No leukocytosis.   Reviewed photos of wound, Surgery Notes, and Wound Care notes.   Remains on IV vancomycin  - day 4.   Called his cell phone and spoke to his wife.  Discussed plan of care with his wife.   Discussed with wife that given current use of duloxetine and mirtazapine, do not recommend linezolid because of risk of serotonin syndrome.  Suspect this was primarily a source control issue - needed further I&D.  No evidence of bony involvement on imaging.    Plan is to continue IV vancomycin X 5 days from I&D on 2/10 - end date of IV 2/15 - then transition to Bactrim 1 DS orally twice a day to complete 14 today days of therapy from I&D with close ID outpatient follow up. Isolate was bactrim sensitive.   Will see patient tomorrow.   Discussed with Primary Team

## 2020-02-12 NOTE — SUBJECTIVE & OBJECTIVE
Interval History:   Discussed case with General Surgery team who evaluated the patient's wound today. No need for washout or debridement per them. Wound care following for dressing changes, seen today.     ID following for abx recommendations. Continuing on Vancomycin for now.     No neuro deficits    Discussed with cardiology who recommended against PFO closure in the setting of MRSA infection, can be scheduled as outpatient.     Review of Systems   Constitutional: Negative for chills and fever.   Eyes: Negative for visual disturbance.   Respiratory: Negative for cough and shortness of breath.    Cardiovascular: Negative for chest pain and leg swelling.   Gastrointestinal: Negative for constipation and diarrhea.   Genitourinary: Negative for dysuria and hematuria.   Skin: Positive for wound.     Objective:     Vital Signs (Most Recent):  Temp: 98.8 °F (37.1 °C) (02/12/20 0352)  Pulse: 70 (02/12/20 1030)  Resp: 17 (02/12/20 1030)  BP: 131/72 (02/12/20 0352)  SpO2: 96 % (02/12/20 1030) Vital Signs (24h Range):  Temp:  [97.9 °F (36.6 °C)-98.8 °F (37.1 °C)] 98.8 °F (37.1 °C)  Pulse:  [67-75] 70  Resp:  [17-18] 17  SpO2:  [94 %-96 %] 96 %  BP: (117-131)/(72-87) 131/72     Weight: 97.5 kg (215 lb)  Body mass index is 29.99 kg/m².    Intake/Output Summary (Last 24 hours) at 2/12/2020 1712  Last data filed at 2/12/2020 0900  Gross per 24 hour   Intake 0 ml   Output --   Net 0 ml      Physical Exam   Constitutional:   Well appearing, resting comfortably in no distress   Cardiovascular: Normal rate and regular rhythm.   Pulmonary/Chest: Effort normal and breath sounds normal. No respiratory distress.   Abdominal: Soft. Bowel sounds are normal. There is no tenderness.   Musculoskeletal: He exhibits no edema or tenderness.   Neurological:   Awake and alert   Skin:   Abscess to crown of head with packing in place. Clean and dry. No surrounding erythema.    Nursing note and vitals reviewed.      Significant Labs: All pertinent  labs within the past 24 hours have been reviewed.

## 2020-02-12 NOTE — SUBJECTIVE & OBJECTIVE
Interval History: No acute events overnight. Afebrile.  Inflammatory markers wnl.     Review of Systems   Constitutional: Negative for activity change, appetite change, chills, diaphoresis, fatigue and fever.   Respiratory: Negative for cough, shortness of breath and wheezing.    Cardiovascular: Negative for chest pain, palpitations and leg swelling.   Gastrointestinal: Negative for abdominal pain, constipation, diarrhea, nausea and vomiting.   Endocrine: Negative for cold intolerance and heat intolerance.   Genitourinary: Negative for dysuria, flank pain and frequency.   Skin: Positive for wound. Negative for rash.   Neurological: Negative for dizziness and headaches.   Psychiatric/Behavioral: Negative for agitation, behavioral problems, confusion and sleep disturbance. The patient is not nervous/anxious.      Objective:     Vital Signs (Most Recent):  Temp: 98.8 °F (37.1 °C) (02/12/20 0352)  Pulse: 75 (02/12/20 0352)  Resp: 17 (02/12/20 0352)  BP: 131/72 (02/12/20 0352)  SpO2: 96 % (02/12/20 0352) Vital Signs (24h Range):  Temp:  [97.6 °F (36.4 °C)-98.8 °F (37.1 °C)] 98.8 °F (37.1 °C)  Pulse:  [67-82] 75  Resp:  [17-20] 17  SpO2:  [94 %-97 %] 96 %  BP: (117-152)/(72-92) 131/72     Weight: 97.5 kg (215 lb)  Body mass index is 29.99 kg/m².    Estimated Creatinine Clearance: 101 mL/min (based on SCr of 1.1 mg/dL).    Physical Exam   Constitutional: He is oriented to person, place, and time. He appears well-developed and well-nourished. No distress.   HENT:   Head: Normocephalic.   Wound to back of head - see Photo below.  No active drainage, granular base. No surrounding erythema   Cardiovascular: Normal rate and regular rhythm.   Murmur heard.  Pulmonary/Chest: Effort normal and breath sounds normal. No respiratory distress. He has no wheezes. He has no rales.   Musculoskeletal: He exhibits no edema.   Neurological: He is alert and oriented to person, place, and time.   Skin: Skin is warm and dry. He is not  diaphoretic.   Psychiatric: He has a normal mood and affect. His behavior is normal.             Significant Labs:   Blood Culture:   Recent Labs   Lab 02/09/20  1135   LABBLOO No Growth to date  No Growth to date  No Growth to date  No Growth to date  No Growth to date  No Growth to date     CBC:   Recent Labs   Lab 02/11/20  0605 02/12/20  0712   WBC 4.56 6.89   HGB 12.9* 13.3*   HCT 38.5* 38.8*    259     CMP:   Recent Labs   Lab 02/11/20  0605 02/12/20  0712    141   K 4.3 4.6    107   CO2 26 25   GLU 84 77   BUN 10 11   CREATININE 1.2 1.1   CALCIUM 9.1 9.4   ANIONGAP 9 9   EGFRNONAA >60.0 >60.0     Wound Culture:   Recent Labs   Lab 01/28/20 1927   LABAERO METHICILLIN RESISTANT STAPHYLOCOCCUS AUREUS  Many  *       Significant Imaging: I have reviewed all pertinent imaging results/findings within the past 24 hours.
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
## 2020-02-13 PROBLEM — R23.9 ALTERATION IN SKIN INTEGRITY: Status: ACTIVE | Noted: 2020-02-13

## 2020-02-13 LAB
CRP SERPL-MCNC: 3.3 MG/L (ref 0–8.2)
ERYTHROCYTE [SEDIMENTATION RATE] IN BLOOD BY WESTERGREN METHOD: 6 MM/HR (ref 0–23)

## 2020-02-13 PROCEDURE — 63600175 PHARM REV CODE 636 W HCPCS: Performed by: STUDENT IN AN ORGANIZED HEALTH CARE EDUCATION/TRAINING PROGRAM

## 2020-02-13 PROCEDURE — 99233 PR SUBSEQUENT HOSPITAL CARE,LEVL III: ICD-10-PCS | Mod: ,,, | Performed by: NURSE PRACTITIONER

## 2020-02-13 PROCEDURE — 86140 C-REACTIVE PROTEIN: CPT

## 2020-02-13 PROCEDURE — 25000003 PHARM REV CODE 250: Performed by: NURSE PRACTITIONER

## 2020-02-13 PROCEDURE — 99233 SBSQ HOSP IP/OBS HIGH 50: CPT | Mod: ,,, | Performed by: STUDENT IN AN ORGANIZED HEALTH CARE EDUCATION/TRAINING PROGRAM

## 2020-02-13 PROCEDURE — 99233 SBSQ HOSP IP/OBS HIGH 50: CPT | Mod: ,,, | Performed by: NURSE PRACTITIONER

## 2020-02-13 PROCEDURE — 94761 N-INVAS EAR/PLS OXIMETRY MLT: CPT

## 2020-02-13 PROCEDURE — 11000001 HC ACUTE MED/SURG PRIVATE ROOM

## 2020-02-13 PROCEDURE — 25000003 PHARM REV CODE 250: Performed by: STUDENT IN AN ORGANIZED HEALTH CARE EDUCATION/TRAINING PROGRAM

## 2020-02-13 PROCEDURE — 85652 RBC SED RATE AUTOMATED: CPT

## 2020-02-13 PROCEDURE — 99233 PR SUBSEQUENT HOSPITAL CARE,LEVL III: ICD-10-PCS | Mod: ,,, | Performed by: STUDENT IN AN ORGANIZED HEALTH CARE EDUCATION/TRAINING PROGRAM

## 2020-02-13 PROCEDURE — 36415 COLL VENOUS BLD VENIPUNCTURE: CPT

## 2020-02-13 RX ORDER — MUPIROCIN 20 MG/G
OINTMENT TOPICAL 2 TIMES DAILY
Status: DISCONTINUED | OUTPATIENT
Start: 2020-02-13 | End: 2020-02-17 | Stop reason: HOSPADM

## 2020-02-13 RX ADMIN — ENOXAPARIN SODIUM 40 MG: 100 INJECTION SUBCUTANEOUS at 04:02

## 2020-02-13 RX ADMIN — VANCOMYCIN HYDROCHLORIDE 1750 MG: 100 INJECTION, POWDER, LYOPHILIZED, FOR SOLUTION INTRAVENOUS at 07:02

## 2020-02-13 RX ADMIN — PREGABALIN 200 MG: 50 CAPSULE ORAL at 03:02

## 2020-02-13 RX ADMIN — MUPIROCIN: 20 OINTMENT TOPICAL at 09:02

## 2020-02-13 RX ADMIN — DULOXETINE HYDROCHLORIDE 60 MG: 60 CAPSULE, DELAYED RELEASE ORAL at 09:02

## 2020-02-13 RX ADMIN — DIVALPROEX SODIUM 500 MG: 250 TABLET, DELAYED RELEASE ORAL at 09:02

## 2020-02-13 RX ADMIN — VANCOMYCIN HYDROCHLORIDE 1750 MG: 100 INJECTION, POWDER, LYOPHILIZED, FOR SOLUTION INTRAVENOUS at 06:02

## 2020-02-13 RX ADMIN — ATORVASTATIN CALCIUM 10 MG: 10 TABLET, FILM COATED ORAL at 09:02

## 2020-02-13 RX ADMIN — DIVALPROEX SODIUM 500 MG: 250 TABLET, DELAYED RELEASE ORAL at 08:02

## 2020-02-13 RX ADMIN — PREGABALIN 200 MG: 50 CAPSULE ORAL at 09:02

## 2020-02-13 RX ADMIN — MIRTAZAPINE 15 MG: 15 TABLET, FILM COATED ORAL at 08:02

## 2020-02-13 RX ADMIN — PREGABALIN 200 MG: 50 CAPSULE ORAL at 08:02

## 2020-02-13 RX ADMIN — ASPIRIN 325 MG ORAL TABLET 325 MG: 325 PILL ORAL at 09:02

## 2020-02-13 NOTE — PROGRESS NOTES
"[facility] Hospital Medicine  Progress Note    Patient Name: Luke Coley  MRN: [id number]  Patient Class: IP- Inpatient   Admission Date: 2/9/2020  Length of Stay: 4 days  Attending Physician: Aldair Campo MD  Primary Care Provider: Koko Stockton Jr, MD    [place] [facility] Medicine Team: [facility]OS[facility] MED 5 Aldair Campo MD    Subjective:     Principal Problem:Abscess        HPI:  The pt is a 43 yo M with a head abscess (cx positive for MRSA), recent ischemic stroke, PFO?, fibromyalgia, IBS, anxiety/depression, and prior history of SI that presents with a chief complaint of wound infection on the back of his head with associated HA. He reports that he was admitted to the hospital on 1/24-1/31 for a stroke which caused him to pass out and hit his head- which subsequently developed into an infection that grew MRSA. He states that he was placed on a 14 day course of Bactrim (which he finished yesterday) and is s/p I&D from  (5-6 days ago from presentation). Despite this, the abscess has gotten larger and he has lost more hair in that region. His ex wife (recent divorce) is an RN and took a look at the area- she advised him to go to the hospital for further evaluation. He reports that he has had chills, fatigue, nausea, HA, and diffuse body aches. He denies any focal weakness, sensory deficit, changes in vision, numbness/tingling, or hearing changes of recent. Describes the HA as a throbbing focused in the area of the abscess with radiation to the front of the head. He denies any current SI or HI. He does feel that he is depressed given his recent illnesses and divorce. He was an alcoholic- but he has since stopped for the last month. Recently completed rehab in [place].     In the ED- he was evaluated and found to be afebrile, WBC count of 5.5,  Lactate and procal wnl, and an MRI of the head- which was positive for "evolving right temporal-parieto-occipital subacute age infarct " "(likely watershed area of MCA/PCA) with interval development of moderate hemorrhage and moderate surrounding vasogenic edema.  No significant mass effect or midline shift. Additional evolving subacute age infarcts within the bilateral basal ganglia and periventricular white matter of the posterior right lateral ventricle." Of note a "small rim enhancing fluid collection overlying the posterosuperior midline calvarium, presumably an abscess given reported history.  No associated calvarial abnormalities."       .       Overview/Hospital Course:  The pt was admitted to hospital medicine on 02/09/2020 for MRSA head abscess and recent ischemic stroke with hemorrhagic conversion in the right temporal perieto- occipital region. Plan to continue IV vancomycin. General surgery consulted I&D performed 2/10. ID consulted and recommends Vanc until 2/15 followed by bactrim.     Interval History: Plan to continue Vanc until Saturday discussed with wife and  at bedside.  Patient continues to visit daughter who is currently admitted to the hospital.  He otherwise has no new complaints.  Given wife's concerns regarding wound dressing will ask for wound care to review dressing at bedside.     Review of Systems   Constitutional: Negative for chills and fever.   Eyes: Negative for pain and visual disturbance.   Respiratory: Negative for cough and shortness of breath.    Cardiovascular: Negative for chest pain and leg swelling.   Gastrointestinal: Negative for abdominal distention and abdominal pain.   Genitourinary: Negative for difficulty urinating and dysuria.   Neurological: Positive for headaches. Negative for light-headedness.     Objective:     Vital Signs (Most Recent):  Temp: 98.4 °F (36.9 °C) (02/13/20 1637)  Pulse: 72 (02/13/20 1637)  Resp: 18 (02/13/20 1637)  BP: 139/85 (02/13/20 1637)  SpO2: 98 % (02/13/20 1637) Vital Signs (24h Range):  Temp:  [97.7 °F (36.5 °C)-98.4 °F (36.9 °C)] 98.4 °F (36.9 °C)  Pulse:  [60-72] " 72  Resp:  [17-18] 18  SpO2:  [97 %-99 %] 98 %  BP: (132-141)/(73-86) 139/85     Weight: 97.5 kg (215 lb)  Body mass index is 29.99 kg/m².  No intake or output data in the 24 hours ending 02/13/20 1841   Physical Exam   Constitutional: He is oriented to person, place, and time. He appears well-developed. No distress.   Well appearing, resting comfortably in no distress   Cardiovascular: Normal rate and regular rhythm.   Pulmonary/Chest: Effort normal and breath sounds normal. No respiratory distress.   Abdominal: Soft. Bowel sounds are normal. There is no tenderness.   Musculoskeletal: Normal range of motion. He exhibits no edema or tenderness.   Neurological: He is alert and oriented to person, place, and time.   Awake and alert   Skin: He is not diaphoretic.   Abscess to crown of head with packing in place. Clean and dry. No surrounding erythema.    Nursing note and vitals reviewed.      Significant Labs:   Recent Lab Results       02/13/20  0654        CRP 3.3     Sed Rate 6         All pertinent labs within the past 24 hours have been reviewed.    Significant Imaging: I have reviewed all pertinent imaging results/findings within the past 24 hours.      Assessment/Plan:      * Abscess of the Head- MRSA positive   The pt is a 45 yo M that presents for evaluation of a head abscess that is culture positive for MRSA- refractory to outpatient management with I&D at  and a 14 day course of Bactrim. He presents with associated HA and fatigue, but no other symptoms or neurologic deficits.     - Continue Vancomycin dosing for skin and soft tissue infections until 2/15 followed by bactrim, appreciate ID recommendations  - Discussed with Neurosurgery and Radiology who reviewed imaging (Neurosurgery also evaluated patient on admission) and assured no bony involvement of abscess  - Gen surgery performed I&D 2/10  Wound care following for dressing management      History of drug abuse  Hx of polysubstance abuse and IVDU.   Will avoid picc line and opiates.       Fibromyalgia  - Continue home meds  - patient home lyrica restarted      HLD (hyperlipidemia)  - Continue home meds        History of Seizure  - Continue home Depakote       Ischemic stroke with subsequent hemmorhagic conversion   - Vascular consult placed from ED  - Vascular neurology reports [facility] consistent with normal stroke evolution  - ASA continued per their recommendations oked heparin and lovenox.       Tobacco use disorder  - Counseling for nicotine cessation with respiratory therapy placed       Chronic pain syndrome  - Continue home meds  - Avoid opiates unless needed, discussed with wife and  given previous history of IVDU/ polysubstance abuse        Generalized anxiety disorder  - Continue home meds      VTE Risk Mitigation (From admission, onward)         Ordered     enoxaparin injection 40 mg  Daily      02/12/20 1721     IP VTE LOW RISK PATIENT  Once      02/09/20 1454     Place sequential compression device  Until discontinued      02/09/20 1454                      Aldair Campo MD  Department of Hospital Medicine   [facility]

## 2020-02-13 NOTE — PHARMACY MED REC
"Admission Medication Reconciliation - Pharmacy Consult Note    The home medication history was taken by Salma Calixto, Pharmacy Tech.  Based on information gathered and subsequent review by the clinical pharmacist, the items below may need attention.     You may go to "Admission" then "Reconcile Home Medications" tabs to review and/or act upon these items.     Potentially problematic discrepancies with current MAR  o Patient IS taking the following which was not ordered upon admit  o Buproprion  mg PO daily (if patient has seizure history, recommend discontinuing)  o Patient IS NOT taking the following which was ordered upon admit  o Patient states he stopped taking divalproex as he was unsure why he was started on it (seizures??)    Potential issues to be addressed PRIOR TO DISCHARGE  o Patient lacks understanding of therapy    Please address this information as you see fit.  Feel free to contact us if you have any questions or require assistance.    Leidy Oakley, PharmD  [phone]                  .    .            "

## 2020-02-13 NOTE — PROGRESS NOTES
Patient seen for wound care follow up. Patient significant other had questions regarding wound care, stated she has worked wound care before. After discussion with significant other and Dr. Campo present, agreed to BID packing with Iodoform gauze, clean with vashe, cavilon skin barrier to periwound.  Significant other asked for medihoney and saline spray.  Significant other denied any further questions. Supplies brought to bedside. Wound care performed, patient tolerated care well.   Dr. Campo approved wound care orders for nursing.  Nursing to continue care. Wound care to follow prn.     02/13/20 1630        Wound 02/10/20 Abscess Head   Date First Assessed: 02/10/20   Pre-existing: Yes  Primary Wound Type: Abscess  Location: Head   Wound Image    Wound WDL ex   Drainage Amount Scant   Drainage Characteristics/Odor Serous;No odor   Appearance Moist;Red;Adipose  (scant slough noted)   Tissue loss description Full thickness   Periwound Area Intact;Macerated   Wound Edges Open   Wound Length (cm) 1 cm   Wound Width (cm) 1.2 cm   Wound Depth (cm) 0.7 cm   Wound Volume (cm^3) 0.84 cm^3   Wound Surface Area (cm^2) 1.2 cm^2   Undermining (depth (cm)/location)   (0.5cm undermining circumference)   Care Cleansed with:  (vashe)   Dressing Changed   Packing gauze, iodoform   Packing Inserted  1   Periwound Care   (marathon and cavilon)   Dressing Change Due 02/14/20

## 2020-02-13 NOTE — PLAN OF CARE
CM was informed by Dr. Campo that the patient will remain hospitalized until IV vancomycin is completed on 2/15/2020 then will discharge home with no needs. Will continue to follow.

## 2020-02-14 LAB
ALBUMIN SERPL BCP-MCNC: 3.8 G/DL (ref 3.5–5.2)
ALP SERPL-CCNC: 86 U/L (ref 55–135)
ALT SERPL W/O P-5'-P-CCNC: 10 U/L (ref 10–44)
ANION GAP SERPL CALC-SCNC: 8 MMOL/L (ref 8–16)
AST SERPL-CCNC: 13 U/L (ref 10–40)
BACTERIA BLD CULT: NORMAL
BACTERIA BLD CULT: NORMAL
BILIRUB SERPL-MCNC: 0.2 MG/DL (ref 0.1–1)
BUN SERPL-MCNC: 10 MG/DL (ref 6–20)
CALCIUM SERPL-MCNC: 9 MG/DL (ref 8.7–10.5)
CHLORIDE SERPL-SCNC: 105 MMOL/L (ref 95–110)
CO2 SERPL-SCNC: 27 MMOL/L (ref 23–29)
CREAT SERPL-MCNC: 1 MG/DL (ref 0.5–1.4)
EST. GFR  (AFRICAN AMERICAN): >60 ML/MIN/1.73 M^2
EST. GFR  (NON AFRICAN AMERICAN): >60 ML/MIN/1.73 M^2
GLUCOSE SERPL-MCNC: 88 MG/DL (ref 70–110)
POTASSIUM SERPL-SCNC: 4 MMOL/L (ref 3.5–5.1)
PROT SERPL-MCNC: 6.8 G/DL (ref 6–8.4)
SODIUM SERPL-SCNC: 140 MMOL/L (ref 136–145)
VANCOMYCIN TROUGH SERPL-MCNC: 13.6 UG/ML (ref 10–22)

## 2020-02-14 PROCEDURE — 36415 COLL VENOUS BLD VENIPUNCTURE: CPT

## 2020-02-14 PROCEDURE — 63600175 PHARM REV CODE 636 W HCPCS: Performed by: STUDENT IN AN ORGANIZED HEALTH CARE EDUCATION/TRAINING PROGRAM

## 2020-02-14 PROCEDURE — 11000001 HC ACUTE MED/SURG PRIVATE ROOM

## 2020-02-14 PROCEDURE — 80053 COMPREHEN METABOLIC PANEL: CPT

## 2020-02-14 PROCEDURE — 25000003 PHARM REV CODE 250: Performed by: STUDENT IN AN ORGANIZED HEALTH CARE EDUCATION/TRAINING PROGRAM

## 2020-02-14 PROCEDURE — 94761 N-INVAS EAR/PLS OXIMETRY MLT: CPT

## 2020-02-14 PROCEDURE — 25000003 PHARM REV CODE 250: Performed by: NURSE PRACTITIONER

## 2020-02-14 PROCEDURE — 99233 SBSQ HOSP IP/OBS HIGH 50: CPT | Mod: ,,, | Performed by: STUDENT IN AN ORGANIZED HEALTH CARE EDUCATION/TRAINING PROGRAM

## 2020-02-14 PROCEDURE — 99233 PR SUBSEQUENT HOSPITAL CARE,LEVL III: ICD-10-PCS | Mod: ,,, | Performed by: STUDENT IN AN ORGANIZED HEALTH CARE EDUCATION/TRAINING PROGRAM

## 2020-02-14 PROCEDURE — 80202 ASSAY OF VANCOMYCIN: CPT

## 2020-02-14 RX ORDER — SULFAMETHOXAZOLE AND TRIMETHOPRIM 800; 160 MG/1; MG/1
1 TABLET ORAL 2 TIMES DAILY
Status: DISCONTINUED | OUTPATIENT
Start: 2020-02-16 | End: 2020-02-17 | Stop reason: HOSPADM

## 2020-02-14 RX ADMIN — DIVALPROEX SODIUM 500 MG: 250 TABLET, DELAYED RELEASE ORAL at 09:02

## 2020-02-14 RX ADMIN — PREGABALIN 200 MG: 50 CAPSULE ORAL at 09:02

## 2020-02-14 RX ADMIN — MIRTAZAPINE 15 MG: 15 TABLET, FILM COATED ORAL at 09:02

## 2020-02-14 RX ADMIN — ATORVASTATIN CALCIUM 10 MG: 10 TABLET, FILM COATED ORAL at 08:02

## 2020-02-14 RX ADMIN — DIVALPROEX SODIUM 500 MG: 250 TABLET, DELAYED RELEASE ORAL at 08:02

## 2020-02-14 RX ADMIN — PREGABALIN 200 MG: 50 CAPSULE ORAL at 08:02

## 2020-02-14 RX ADMIN — MUPIROCIN: 20 OINTMENT TOPICAL at 11:02

## 2020-02-14 RX ADMIN — DULOXETINE HYDROCHLORIDE 60 MG: 60 CAPSULE, DELAYED RELEASE ORAL at 08:02

## 2020-02-14 RX ADMIN — VANCOMYCIN HYDROCHLORIDE 1750 MG: 100 INJECTION, POWDER, LYOPHILIZED, FOR SOLUTION INTRAVENOUS at 09:02

## 2020-02-14 RX ADMIN — PREGABALIN 200 MG: 50 CAPSULE ORAL at 04:02

## 2020-02-14 RX ADMIN — MUPIROCIN: 20 OINTMENT TOPICAL at 08:02

## 2020-02-14 RX ADMIN — VANCOMYCIN HYDROCHLORIDE 1750 MG: 100 INJECTION, POWDER, LYOPHILIZED, FOR SOLUTION INTRAVENOUS at 08:02

## 2020-02-14 RX ADMIN — ASPIRIN 325 MG ORAL TABLET 325 MG: 325 PILL ORAL at 08:02

## 2020-02-14 NOTE — ASSESSMENT & PLAN NOTE
- Continue home Depakote. Patient reports unclear indication but in setting of recent stroke will not discontinue. Can consider trial of removal as outpatient.

## 2020-02-14 NOTE — CONSULTS
Pharmacokinetic Assessment Follow Up: IV Vancomycin    Vancomycin serum concentration assessment(s):    The trough level was drawn correctly and can be used to guide therapy at this time. The measurement is within the desired definitive target range of 10 to 20 mcg/mL.    Vancomycin Regimen Plan:    Continue regimen to Vancomycin 1750 mg IV every 12 hours to end tomorrow 2/15.    Drug levels (last 3 results):  Recent Labs   Lab Result Units 02/14/20  0725   Vancomycin-Trough ug/mL 13.6       Pharmacy will continue to follow and monitor vancomycin.    Please contact pharmacy at extension [phone] for questions regarding this assessment.    Thank you for the consult,   Leidy Ibrahim       Patient brief summary:  Luke Coley is a 44 y.o. male initiated on antimicrobial therapy with IV Vancomycin for treatment of skin & soft tissue infection      Drug Allergies:   Review of patient's allergies indicates:  No Known Allergies    Actual Body Weight:   97.5 kg    Renal Function:   Estimated Creatinine Clearance: 111.1 mL/min (based on SCr of 1 mg/dL).,     Dialysis Method (if applicable):  N/A    CBC (last 72 hours):  Recent Labs   Lab Result Units 02/12/20  0712   WBC K/uL 6.89   Hemoglobin g/dL 13.3*   Hematocrit % 38.8*   Platelets K/uL 259   Gran% % 57.5   Lymph% % 28.4   Mono% % 9.9   Eosinophil% % 2.6   Basophil% % 0.9   Differential Method  Automated       Metabolic Panel (last 72 hours):  Recent Labs   Lab Result Units 02/12/20  0712 02/14/20  0725   Sodium mmol/L 141 140   Potassium mmol/L 4.6 4.0   Chloride mmol/L 107 105   CO2 mmol/L 25 27   Glucose mg/dL 77 88   BUN, Bld mg/dL 11 10   Creatinine mg/dL 1.1 1.0   Albumin g/dL  --  3.8   Total Bilirubin mg/dL  --  0.2   Alkaline Phosphatase U/L  --  86   AST U/L  --  13   ALT U/L  --  10       Vancomycin Administrations:  vancomycin given in the last 96 hours                   vancomycin 1750 mg in 0.9% sodium chloride 500 mL IVPB (mg) 1,750 mg New Bag  02/14/20 0858     1,750 mg New Bag 02/13/20 1936     1,750 mg New Bag  0653     1,750 mg New Bag 02/12/20 1847     1,750 mg New Bag 02/11/20 2332     1,750 mg New Bag  1236     1,750 mg New Bag  0113     1,750 mg New Bag 02/10/20 1226                Microbiologic Results:  Microbiology Results (last 7 days)     Procedure Component Value Units Date/Time    Blood culture x two cultures. Draw prior to antibiotics. [[id number]] Collected:  02/09/20 1135    Order Status:  Completed Specimen:  Blood from Peripheral, Antecubital, Left Updated:  02/13/20 1412     Blood Culture, Routine No Growth to date      No Growth to date      No Growth to date      No Growth to date      No Growth to date    Narrative:       Aerobic and anaerobic    Blood culture x two cultures. Draw prior to antibiotics. [[id number]] Collected:  02/09/20 1135    Order Status:  Completed Specimen:  Blood from Peripheral, Hand, Left Updated:  02/13/20 1412     Blood Culture, Routine No Growth to date      No Growth to date      No Growth to date      No Growth to date      No Growth to date    Narrative:       Aerobic and anaerobic

## 2020-02-14 NOTE — ASSESSMENT & PLAN NOTE
The pt is a 45 yo M that presents for evaluation of a head abscess that is culture positive for MRSA- refractory to outpatient management with I&D at  and a 14 day course of Bactrim. He presents with associated HA and fatigue, but no other symptoms or neurologic deficits.     - Continue Vancomycin dosing for skin and soft tissue infections until 2/15 followed by bactrim, appreciate ID recommendations  - Discussed with Neurosurgery and Radiology who reviewed imaging (Neurosurgery also evaluated patient on admission) and assured no bony involvement of abscess  - Gen surgery performed I&D 2/10  Wound care following for dressing management

## 2020-02-14 NOTE — PROGRESS NOTES
[facility]  Infectious Disease  Progress Note    Patient Name: Luke Coley  MRN: [id number]  Admission Date: 2/9/2020  Length of Stay: 4 days  Attending Physician: Aldair Campo MD  Primary Care Provider: Koko Stockton Jr, MD    Isolation Status: Contact  Assessment/Plan:      * Abscess of the Head- MRSA positive      44 year old with recent ischemic CVA who presented with head abscess after a fall.  Cultures of 1/28 during most recent hospitalization were + for MRSA.   Recently I&D at [facility] which failed to respond to outpatient Bactrim.  MRI on admit showed abscess over posterosuperior midline calvarium.  He underwent repeat bedside I&D on 2/10.  No cultures sent.  Blood cultures of 2/9 negative.   Recurrence possible related to need for further source control which has now been achieved.       Afebrile.  Wound Care following.  Day 4 of IV vancomycin after I&D.  Inflammatory markers wnl.     Plan:  1.  Continue IV vancomycin through Saturday 2/15 (5 days of IV) then transition to oral Bactrim DS, one table twice daily to complete 14 days of antibiotics from date of most recent I&D.  End date 2/24.  Maintain trough 15-20.  2.  CMP in am. Check creatinine - ensure no dose adjustments needed for Bactrim  3.  Nasal mupirocin - apply to each nares twice a day X 5 days for decolonization (ordered)  4.  Wound Care per Wound Care recommendations.  5.  ID follow up at end of therapy.  ID will make the appointment.  If no appointment scheduled prior to discharge, please send Epic message to ID charge nurse Alma Willams.  Patient also given my office contact information.   6.  Will sign off.  Please call or re-consult as needed.     Data reviewed and plan discussed with ID staff  Discussed with Primary Team         Thank you.   Please call for any questions or concerns.  RYAN Pollack, ANP-C  [phone] pager, [phone]    Subjective:     Principal Problem:Abscess    HPI: The pt  "is a 45 yo M with a head abscess (cx positive for MRSA), recent ischemic stroke, PFO?, fibromyalgia, IBS, anxiety/depression, and prior history of SI that presents with a chief complaint of wound infection on the back of his head with associated HA. He reports that he was admitted to the hospital on 1/24-1/31 for a stroke which caused him to pass out and hit his head- which subsequently developed into an infection that grew MRSA. He states that he was placed on a 14 day course of Bactrim (which he finished yesterday) and is s/p I&D from  (5-6 days ago from presentation). Despite this, the abscess has gotten larger and he has lost more hair in that region. His ex wife (recent divorce) is an RN and took a look at the area- she advised him to go to the hospital for further evaluation. He reports that he has had chills, fatigue, nausea, HA, and diffuse body aches. He denies any focal weakness, sensory deficit, changes in vision, numbness/tingling, or hearing changes of recent. Describes the HA as a throbbing focused in the area of the abscess with radiation to the front of the head. He denies any current SI or HI. He does feel that he is depressed given his recent illnesses and divorce. He was an alcoholic- but he has since stopped for the last month. Recently completed rehab in [place].      In the ED- he was evaluated and found to be afebrile, WBC count of 5.5,  Lactate and procal wnl, and an MRI of the head- which was positive for "evolving right temporal-parieto-occipital subacute age infarct (likely watershed area of MCA/PCA) with interval development of moderate hemorrhage and moderate surrounding vasogenic edema.  No significant mass effect or midline shift. Additional evolving subacute age infarcts within the bilateral basal ganglia and periventricular white matter of the posterior right lateral ventricle." Of note a "small rim enhancing fluid collection overlying the posterosuperior midline calvarium, " "presumably an abscess given reported history.  No associated calvarial abnormalities."     Patient was seen in the ED by Gen Fitzgerald and had I&D.  No cultures sent.  He has been placed on Vancomycin.  Blood cultures are NGTD.  He has been afebrile without leukocytosis.  .He reports fever, chills, sweats, N/D, HA.    Interval History: No acute events overnight. Afebrile.  Inflammatory markers wnl.     Review of Systems   Constitutional: Negative for activity change, appetite change, chills, diaphoresis, fatigue and fever.   Respiratory: Negative for cough, shortness of breath and wheezing.    Cardiovascular: Negative for chest pain, palpitations and leg swelling.   Gastrointestinal: Negative for abdominal pain, constipation, diarrhea, nausea and vomiting.   Endocrine: Negative for cold intolerance and heat intolerance.   Genitourinary: Negative for dysuria, flank pain and frequency.   Skin: Positive for wound. Negative for rash.   Neurological: Negative for dizziness and headaches.   Psychiatric/Behavioral: Negative for agitation, behavioral problems, confusion and sleep disturbance. The patient is not nervous/anxious.      Objective:     Vital Signs (Most Recent):  Temp: 98.8 °F (37.1 °C) (02/12/20 0352)  Pulse: 75 (02/12/20 0352)  Resp: 17 (02/12/20 0352)  BP: 131/72 (02/12/20 0352)  SpO2: 96 % (02/12/20 0352) Vital Signs (24h Range):  Temp:  [97.6 °F (36.4 °C)-98.8 °F (37.1 °C)] 98.8 °F (37.1 °C)  Pulse:  [67-82] 75  Resp:  [17-20] 17  SpO2:  [94 %-97 %] 96 %  BP: (117-152)/(72-92) 131/72     Weight: 97.5 kg (215 lb)  Body mass index is 29.99 kg/m².    Estimated Creatinine Clearance: 101 mL/min (based on SCr of 1.1 mg/dL).    Physical Exam   Constitutional: He is oriented to person, place, and time. He appears well-developed and well-nourished. No distress.   HENT:   Head: Normocephalic.   Wound to back of head - see Photo below.  No active drainage, granular base. No surrounding erythema   Cardiovascular: Normal rate " and regular rhythm.   Murmur heard.  Pulmonary/Chest: Effort normal and breath sounds normal. No respiratory distress. He has no wheezes. He has no rales.   Musculoskeletal: He exhibits no edema.   Neurological: He is alert and oriented to person, place, and time.   Skin: Skin is warm and dry. He is not diaphoretic.   Psychiatric: He has a normal mood and affect. His behavior is normal.             Significant Labs:   Blood Culture:   Recent Labs   Lab 02/09/20  1135   LABBLOO No Growth to date  No Growth to date  No Growth to date  No Growth to date  No Growth to date  No Growth to date     CBC:   Recent Labs   Lab 02/11/20  0605 02/12/20  0712   WBC 4.56 6.89   HGB 12.9* 13.3*   HCT 38.5* 38.8*    259     CMP:   Recent Labs   Lab 02/11/20  0605 02/12/20  0712    141   K 4.3 4.6    107   CO2 26 25   GLU 84 77   BUN 10 11   CREATININE 1.2 1.1   CALCIUM 9.1 9.4   ANIONGAP 9 9   EGFRNONAA >60.0 >60.0     Wound Culture:   Recent Labs   Lab 01/28/20  1927   LABAERO METHICILLIN RESISTANT STAPHYLOCOCCUS AUREUS  Many  *       Significant Imaging: I have reviewed all pertinent imaging results/findings within the past 24 hours.

## 2020-02-14 NOTE — SUBJECTIVE & OBJECTIVE
Interval History:   No new complaints today. Patient feeling well.     Wound care plan established with wound care team and patient's ex wife over the last several days, she will continue wound care after discharge.     Per ID continuing IV vancomycin through 2/15 then transition to PO Bactrim.     Inflammatory markers checked yesterday, negative.     Review of Systems   Constitutional: Negative for chills and fever.   Gastrointestinal: Negative for constipation, nausea and vomiting.   Skin: Positive for wound. Negative for rash.   Neurological: Positive for headaches.   Psychiatric/Behavioral: Negative for agitation and confusion.     Objective:     Vital Signs (Most Recent):  Temp: 97 °F (36.1 °C) (02/14/20 0735)  Pulse: 67 (02/14/20 0735)  Resp: 14 (02/14/20 0735)  BP: 119/77 (02/14/20 0735)  SpO2: 95 % (02/14/20 0735) Vital Signs (24h Range):  Temp:  [97 °F (36.1 °C)-98.4 °F (36.9 °C)] 97 °F (36.1 °C)  Pulse:  [59-72] 67  Resp:  [14-18] 14  SpO2:  [93 %-99 %] 95 %  BP: (119-139)/(75-88) 119/77     Weight: 97.5 kg (215 lb)  Body mass index is 29.99 kg/m².  No intake or output data in the 24 hours ending 02/14/20 0815   Physical Exam   Constitutional:   Well appearing. Sitting up in bed in no distress   HENT:   Mouth/Throat: Oropharynx is clear and moist.   Cardiovascular: Normal rate and regular rhythm.   Pulmonary/Chest: Effort normal. No respiratory distress.   Skin:   Dressing in place to crown of head not taken down. Clean and dry   Psychiatric: He has a normal mood and affect. His behavior is normal.   Nursing note and vitals reviewed.      Significant Labs: All pertinent labs within the past 24 hours have been reviewed.  Inflammatory markers not elevated

## 2020-02-14 NOTE — ASSESSMENT & PLAN NOTE
- Continue home meds  - Avoid opiates unless needed, discussed with wife and  given previous history of IVDU/ polysubstance abuse

## 2020-02-14 NOTE — PLAN OF CARE
0900  CM was informed by IM5 that the patient's x-wife, Leidy Coley ([phone]), will perform the patient's daily dressing changes.     1015  Patient awake & alert when CM rounded. CM informed the patient of plan to discharge tomorrow after completion of his IV abx & that Leidy will perform his dressing changes. Patient stated that he did not think Leidy would be able to perform the dressing changes because their daughter is currently hospitalized but gave this CM permission to verify with Leidy. Pt also stated that he will need assistance with transportation at time of discharge.     1140  CM received confirmation via phone from Leidy that she will be able to perform the patient's daily dressing changes. CM requested that the patient's nurse, Figueroa ([phone]), provide needed wound care supplies at time of discharge. CM informed the patient of above.     Will continue to follow.

## 2020-02-14 NOTE — PLAN OF CARE
Problem: Fall Injury Risk  Goal: Absence of Fall and Fall-Related Injury  Outcome: Ongoing, Progressing     Problem: Adult Inpatient Plan of Care  Goal: Plan of Care Review  Outcome: Ongoing, Progressing  Goal: Patient-Specific Goal (Individualization)  Outcome: Ongoing, Progressing  Goal: Absence of Hospital-Acquired Illness or Injury  Outcome: Ongoing, Progressing  Goal: Optimal Comfort and Wellbeing  Outcome: Ongoing, Progressing  Goal: Readiness for Transition of Care  Outcome: Ongoing, Progressing  Goal: Rounds/Family Conference  Outcome: Ongoing, Progressing     Problem: Infection  Goal: Infection Symptom Resolution  Outcome: Ongoing, Progressing     Problem: Wound  Goal: Optimal Wound Healing  Outcome: Ongoing, Progressing   Patient rested this shift without event. No c/o pain/discomfort. No adverse reaction from antibiotic therapy.  Will monitor. Srx2, bed low, CB near.

## 2020-02-14 NOTE — ASSESSMENT & PLAN NOTE
- Vascular consult placed from ED  - Vascular neurology reports [facility] imaging consistent with normal stroke evolution  - ASA continued per their recommendations oked heparin and lovenox.

## 2020-02-14 NOTE — PROGRESS NOTES
"[facility] Hospital Medicine  Progress Note    Patient Name: Luke Coley  MRN: [id number]  Patient Class: IP- Inpatient   Admission Date: 2/9/2020  Length of Stay: 5 days  Attending Physician: Aldair Campo MD  Primary Care Provider: Koko Stockton Jr, MD    [facility] Medicine Team: [facility] [place] [facility] 5 Joshua Negro MD    Subjective:     Principal Problem:Abscess        HPI:  The pt is a 43 yo M with a head abscess (cx positive for MRSA), recent ischemic stroke, PFO?, fibromyalgia, IBS, anxiety/depression, and prior history of SI that presents with a chief complaint of wound infection on the back of his head with associated HA. He reports that he was admitted to the hospital on 1/24-1/31 for a stroke which caused him to pass out and hit his head- which subsequently developed into an infection that grew MRSA. He states that he was placed on a 14 day course of Bactrim (which he finished yesterday) and is s/p I&D from  (5-6 days ago from presentation). Despite this, the abscess has gotten larger and he has lost more hair in that region. His ex wife (recent divorce) is an RN and took a look at the area- she advised him to go to the hospital for further evaluation. He reports that he has had chills, fatigue, nausea, HA, and diffuse body aches. He denies any focal weakness, sensory deficit, changes in vision, numbness/tingling, or hearing changes of recent. Describes the HA as a throbbing focused in the area of the abscess with radiation to the front of the head. He denies any current SI or HI. He does feel that he is depressed given his recent illnesses and divorce. He was an alcoholic- but he has since stopped for the last month. Recently completed rehab in [place].     In the ED- he was evaluated and found to be afebrile, WBC count of 5.5,  Lactate and procal wnl, and an MRI of the head- which was positive for "evolving right temporal-parieto-occipital subacute age infarct (likely " "watershed area of MCA/PCA) with interval development of moderate hemorrhage and moderate surrounding vasogenic edema.  No significant mass effect or midline shift. Additional evolving subacute age infarcts within the bilateral basal ganglia and periventricular white matter of the posterior right lateral ventricle." Of note a "small rim enhancing fluid collection overlying the posterosuperior midline calvarium, presumably an abscess given reported history.  No associated calvarial abnormalities."       .     Overview/Hospital Course:  The pt was admitted to hospital medicine on 02/09/2020 for MRSA head abscess and recent ischemic stroke with hemorrhagic conversion in the right temporal perieto- occipital region. Plan to continue IV vancomycin. General surgery consulted I&D performed 2/10. ID consulted for recs before discharge.    Interval History:   No new complaints today. Patient feeling well.     Wound care plan established with wound care team and patient's ex wife over the last several days, she will continue wound care after discharge.     Per ID continuing IV vancomycin through 2/15 then transition to PO Bactrim.     Inflammatory markers checked yesterday, negative.     Review of Systems   Constitutional: Negative for chills and fever.   Gastrointestinal: Negative for constipation, nausea and vomiting.   Skin: Positive for wound. Negative for rash.   Neurological: Positive for headaches.   Psychiatric/Behavioral: Negative for agitation and confusion.     Objective:     Vital Signs (Most Recent):  Temp: 97 °F (36.1 °C) (02/14/20 0735)  Pulse: 67 (02/14/20 0735)  Resp: 14 (02/14/20 0735)  BP: 119/77 (02/14/20 0735)  SpO2: 95 % (02/14/20 0735) Vital Signs (24h Range):  Temp:  [97 °F (36.1 °C)-98.4 °F (36.9 °C)] 97 °F (36.1 °C)  Pulse:  [59-72] 67  Resp:  [14-18] 14  SpO2:  [93 %-99 %] 95 %  BP: (119-139)/(75-88) 119/77     Weight: 97.5 kg (215 lb)  Body mass index is 29.99 kg/m².  No intake or output data in the " 24 hours ending 02/14/20 0815   Physical Exam   Constitutional:   Well appearing. Sitting up in bed in no distress   HENT:   Mouth/Throat: Oropharynx is clear and moist.   Cardiovascular: Normal rate and regular rhythm.   Pulmonary/Chest: Effort normal. No respiratory distress.   Skin:   Dressing in place to crown of head not taken down. Clean and dry   Psychiatric: He has a normal mood and affect. His behavior is normal.   Nursing note and vitals reviewed.      Significant Labs: All pertinent labs within the past 24 hours have been reviewed.  Inflammatory markers not elevated      Assessment/Plan:      * Abscess of the Head- MRSA positive   The pt is a 43 yo M that presents for evaluation of a head abscess that is culture positive for MRSA- refractory to outpatient management with I&D at  and a 14 day course of Bactrim. He presents with associated HA and fatigue, but no other symptoms or neurologic deficits.     - Continue Vancomycin dosing for skin and soft tissue infections until 2/15 followed by bactrim, appreciate ID recommendations  - Discussed with Neurosurgery and Radiology who reviewed imaging (Neurosurgery also evaluated patient on admission) and assured no bony involvement of abscess  - Gen surgery performed I&D 2/10  Wound care following for dressing management      History of drug abuse  Hx of polysubstance abuse and IVDU.  Will avoid picc line and opiates.       Fibromyalgia  - Continue home meds  - patient home lyrica restarted      HLD (hyperlipidemia)  - Continue home meds        History of Seizure  - Continue home Depakote. Patient reports unclear indication but in setting of recent stroke will not discontinue. Can consider trial of removal as outpatient.       Ischemic stroke with subsequent hemmorhagic conversion   - Vascular consult placed from ED  - Vascular neurology reports [facility] imaging consistent with normal stroke evolution  - ASA continued per their recommendations oked heparin and  lovenox for DVT ppx      Tobacco use disorder  - Counseling for nicotine cessation with respiratory therapy placed       Chronic pain syndrome  - Continue home meds  - Avoid opiates unless needed, discussed with wife and  given previous history of IVDU/ polysubstance abuse        Generalized anxiety disorder  - Continue home meds        VTE Risk Mitigation (From admission, onward)         Ordered     IP VTE LOW RISK PATIENT  Once      02/09/20 1454     Place sequential compression device  Until discontinued      02/09/20 1454                      Joshua Negro MD  Department of Hospital Medicine   [facility]

## 2020-02-14 NOTE — SUBJECTIVE & OBJECTIVE
Interval History: Plan to continue Vanc until Saturday discussed with wife and  at bedside.  Patient continues to visit daughter who is currently admitted to the hospital.  He otherwise has no new complaints.  Given wife's concerns regarding wound dressing will ask for wound care to review dressing at bedside.     Review of Systems   Constitutional: Negative for chills and fever.   Eyes: Negative for pain and visual disturbance.   Respiratory: Negative for cough and shortness of breath.    Cardiovascular: Negative for chest pain and leg swelling.   Gastrointestinal: Negative for abdominal distention and abdominal pain.   Genitourinary: Negative for difficulty urinating and dysuria.   Neurological: Positive for headaches. Negative for light-headedness.     Objective:     Vital Signs (Most Recent):  Temp: 98.4 °F (36.9 °C) (02/13/20 1637)  Pulse: 72 (02/13/20 1637)  Resp: 18 (02/13/20 1637)  BP: 139/85 (02/13/20 1637)  SpO2: 98 % (02/13/20 1637) Vital Signs (24h Range):  Temp:  [97.7 °F (36.5 °C)-98.4 °F (36.9 °C)] 98.4 °F (36.9 °C)  Pulse:  [60-72] 72  Resp:  [17-18] 18  SpO2:  [97 %-99 %] 98 %  BP: (132-141)/(73-86) 139/85     Weight: 97.5 kg (215 lb)  Body mass index is 29.99 kg/m².  No intake or output data in the 24 hours ending 02/13/20 1841   Physical Exam   Constitutional: He is oriented to person, place, and time. He appears well-developed. No distress.   Well appearing, resting comfortably in no distress   Cardiovascular: Normal rate and regular rhythm.   Pulmonary/Chest: Effort normal and breath sounds normal. No respiratory distress.   Abdominal: Soft. Bowel sounds are normal. There is no tenderness.   Musculoskeletal: Normal range of motion. He exhibits no edema or tenderness.   Neurological: He is alert and oriented to person, place, and time.   Awake and alert   Skin: He is not diaphoretic.   Abscess to crown of head with packing in place. Clean and dry. No surrounding erythema.    Nursing note  and vitals reviewed.      Significant Labs:   Recent Lab Results       02/13/20  0654        CRP 3.3     Sed Rate 6         All pertinent labs within the past 24 hours have been reviewed.    Significant Imaging: I have reviewed all pertinent imaging results/findings within the past 24 hours.

## 2020-02-14 NOTE — ASSESSMENT & PLAN NOTE
- Vascular consult placed from ED  - Vascular neurology reports [facility] imaging consistent with normal stroke evolution  - ASA continued per their recommendations oked heparin and lovenox for DVT ppx

## 2020-02-15 LAB
ALBUMIN SERPL BCP-MCNC: 3.8 G/DL (ref 3.5–5.2)
ALP SERPL-CCNC: 102 U/L (ref 55–135)
ALT SERPL W/O P-5'-P-CCNC: 10 U/L (ref 10–44)
ANION GAP SERPL CALC-SCNC: 8 MMOL/L (ref 8–16)
AST SERPL-CCNC: 12 U/L (ref 10–40)
BASOPHILS # BLD AUTO: 0.05 K/UL (ref 0–0.2)
BASOPHILS NFR BLD: 0.9 % (ref 0–1.9)
BILIRUB SERPL-MCNC: 0.2 MG/DL (ref 0.1–1)
BUN SERPL-MCNC: 9 MG/DL (ref 6–20)
CALCIUM SERPL-MCNC: 8.9 MG/DL (ref 8.7–10.5)
CHLORIDE SERPL-SCNC: 105 MMOL/L (ref 95–110)
CO2 SERPL-SCNC: 27 MMOL/L (ref 23–29)
CREAT SERPL-MCNC: 0.9 MG/DL (ref 0.5–1.4)
CRP SERPL-MCNC: 1.6 MG/L (ref 0–8.2)
DIFFERENTIAL METHOD: ABNORMAL
EOSINOPHIL # BLD AUTO: 0.2 K/UL (ref 0–0.5)
EOSINOPHIL NFR BLD: 3.6 % (ref 0–8)
ERYTHROCYTE [DISTWIDTH] IN BLOOD BY AUTOMATED COUNT: 12.6 % (ref 11.5–14.5)
ERYTHROCYTE [SEDIMENTATION RATE] IN BLOOD BY WESTERGREN METHOD: 6 MM/HR (ref 0–23)
EST. GFR  (AFRICAN AMERICAN): >60 ML/MIN/1.73 M^2
EST. GFR  (NON AFRICAN AMERICAN): >60 ML/MIN/1.73 M^2
GLUCOSE SERPL-MCNC: 89 MG/DL (ref 70–110)
HCT VFR BLD AUTO: 38.1 % (ref 40–54)
HGB BLD-MCNC: 13.2 G/DL (ref 14–18)
IMM GRANULOCYTES # BLD AUTO: 0.02 K/UL (ref 0–0.04)
IMM GRANULOCYTES NFR BLD AUTO: 0.4 % (ref 0–0.5)
LYMPHOCYTES # BLD AUTO: 2.1 K/UL (ref 1–4.8)
LYMPHOCYTES NFR BLD: 37.7 % (ref 18–48)
MCH RBC QN AUTO: 32.1 PG (ref 27–31)
MCHC RBC AUTO-ENTMCNC: 34.6 G/DL (ref 32–36)
MCV RBC AUTO: 93 FL (ref 82–98)
MONOCYTES # BLD AUTO: 0.7 K/UL (ref 0.3–1)
MONOCYTES NFR BLD: 12.2 % (ref 4–15)
NEUTROPHILS # BLD AUTO: 2.5 K/UL (ref 1.8–7.7)
NEUTROPHILS NFR BLD: 45.2 % (ref 38–73)
NRBC BLD-RTO: 0 /100 WBC
PLATELET # BLD AUTO: 240 K/UL (ref 150–350)
PMV BLD AUTO: 10.4 FL (ref 9.2–12.9)
POTASSIUM SERPL-SCNC: 3.7 MMOL/L (ref 3.5–5.1)
PROT SERPL-MCNC: 6.9 G/DL (ref 6–8.4)
RBC # BLD AUTO: 4.11 M/UL (ref 4.6–6.2)
SODIUM SERPL-SCNC: 140 MMOL/L (ref 136–145)
WBC # BLD AUTO: 5.59 K/UL (ref 3.9–12.7)

## 2020-02-15 PROCEDURE — 85652 RBC SED RATE AUTOMATED: CPT

## 2020-02-15 PROCEDURE — 25000003 PHARM REV CODE 250: Performed by: STUDENT IN AN ORGANIZED HEALTH CARE EDUCATION/TRAINING PROGRAM

## 2020-02-15 PROCEDURE — 85025 COMPLETE CBC W/AUTO DIFF WBC: CPT

## 2020-02-15 PROCEDURE — 25500020 PHARM REV CODE 255: Performed by: INTERNAL MEDICINE

## 2020-02-15 PROCEDURE — 86140 C-REACTIVE PROTEIN: CPT

## 2020-02-15 PROCEDURE — 80053 COMPREHEN METABOLIC PANEL: CPT

## 2020-02-15 PROCEDURE — 99497 PR ADVNCD CARE PLAN 30 MIN: ICD-10-PCS | Mod: ,,, | Performed by: INTERNAL MEDICINE

## 2020-02-15 PROCEDURE — 99497 ADVNCD CARE PLAN 30 MIN: CPT | Mod: ,,, | Performed by: INTERNAL MEDICINE

## 2020-02-15 PROCEDURE — 99233 SBSQ HOSP IP/OBS HIGH 50: CPT | Mod: ,,, | Performed by: INTERNAL MEDICINE

## 2020-02-15 PROCEDURE — 99233 PR SUBSEQUENT HOSPITAL CARE,LEVL III: ICD-10-PCS | Mod: ,,, | Performed by: INTERNAL MEDICINE

## 2020-02-15 PROCEDURE — 11000001 HC ACUTE MED/SURG PRIVATE ROOM

## 2020-02-15 PROCEDURE — A9585 GADOBUTROL INJECTION: HCPCS | Performed by: INTERNAL MEDICINE

## 2020-02-15 PROCEDURE — 36415 COLL VENOUS BLD VENIPUNCTURE: CPT

## 2020-02-15 RX ORDER — GADOBUTROL 604.72 MG/ML
10 INJECTION INTRAVENOUS
Status: COMPLETED | OUTPATIENT
Start: 2020-02-15 | End: 2020-02-15

## 2020-02-15 RX ORDER — SULFAMETHOXAZOLE AND TRIMETHOPRIM 800; 160 MG/1; MG/1
1 TABLET ORAL 2 TIMES DAILY
Qty: 18 TABLET | Refills: 0 | Status: SHIPPED | OUTPATIENT
Start: 2020-02-15 | End: 2020-02-26

## 2020-02-15 RX ORDER — ENOXAPARIN SODIUM 100 MG/ML
40 INJECTION SUBCUTANEOUS EVERY 24 HOURS
Status: DISCONTINUED | OUTPATIENT
Start: 2020-02-15 | End: 2020-02-17 | Stop reason: HOSPADM

## 2020-02-15 RX ORDER — MUPIROCIN 20 MG/G
OINTMENT TOPICAL 2 TIMES DAILY
Qty: 22 G | Refills: 0 | Status: SHIPPED | OUTPATIENT
Start: 2020-02-15 | End: 2020-02-27

## 2020-02-15 RX ADMIN — DIVALPROEX SODIUM 500 MG: 250 TABLET, DELAYED RELEASE ORAL at 09:02

## 2020-02-15 RX ADMIN — ATORVASTATIN CALCIUM 10 MG: 10 TABLET, FILM COATED ORAL at 09:02

## 2020-02-15 RX ADMIN — DULOXETINE HYDROCHLORIDE 60 MG: 60 CAPSULE, DELAYED RELEASE ORAL at 09:02

## 2020-02-15 RX ADMIN — ASPIRIN 325 MG ORAL TABLET 325 MG: 325 PILL ORAL at 09:02

## 2020-02-15 RX ADMIN — MUPIROCIN: 20 OINTMENT TOPICAL at 10:02

## 2020-02-15 RX ADMIN — PREGABALIN 200 MG: 50 CAPSULE ORAL at 09:02

## 2020-02-15 RX ADMIN — PREGABALIN 200 MG: 50 CAPSULE ORAL at 03:02

## 2020-02-15 RX ADMIN — MIRTAZAPINE 15 MG: 15 TABLET, FILM COATED ORAL at 09:02

## 2020-02-15 RX ADMIN — VANCOMYCIN HYDROCHLORIDE 1750 MG: 100 INJECTION, POWDER, LYOPHILIZED, FOR SOLUTION INTRAVENOUS at 09:02

## 2020-02-15 RX ADMIN — GADOBUTROL 10 ML: 604.72 INJECTION INTRAVENOUS at 02:02

## 2020-02-15 NOTE — SUBJECTIVE & OBJECTIVE
Interval History:   Overnight wife was extremely concerned about potential tracking of wound bed.     Have asked General Surgery to re-evaluate the wound to determine if it might benefit from further debridement.     Patient continues to have mild headaches but no fevers or any other complaints.     Review of Systems   Constitutional: Negative for chills and fever.   Respiratory: Negative for cough and shortness of breath.    Gastrointestinal: Negative for constipation, diarrhea, nausea and vomiting.   Musculoskeletal: Negative for arthralgias and myalgias.   Skin: Positive for wound.     Objective:     Vital Signs (Most Recent):  Temp: 98.2 °F (36.8 °C) (02/15/20 1200)  Pulse: 73 (02/15/20 1200)  Resp: 16 (02/15/20 1200)  BP: 118/64 (02/15/20 0800)  SpO2: 99 % (02/15/20 1200) Vital Signs (24h Range):  Temp:  [98 °F (36.7 °C)-98.2 °F (36.8 °C)] 98.2 °F (36.8 °C)  Pulse:  [63-73] 73  Resp:  [16-18] 16  SpO2:  [95 %-99 %] 99 %  BP: (118-144)/(64-88) 118/64     Weight: 97.5 kg (215 lb)  Body mass index is 29.99 kg/m².    Intake/Output Summary (Last 24 hours) at 2/15/2020 1623  Last data filed at 2/15/2020 1200  Gross per 24 hour   Intake 960 ml   Output 3 ml   Net 957 ml      Physical Exam   Constitutional: He is oriented to person, place, and time.   Well appearing man sitting up in no distress   Cardiovascular: Normal rate and regular rhythm.   Pulmonary/Chest: Effort normal and breath sounds normal. No respiratory distress.   Abdominal: Soft. He exhibits no distension. There is no tenderness.   Neurological: He is alert and oriented to person, place, and time.   Skin:   Wound to crown of head with packing in place. Dressing removed. Clean and dry with no surrounding erythema.    Psychiatric: He has a normal mood and affect. His behavior is normal.   Nursing note and vitals reviewed.      Significant Labs: All pertinent labs within the past 24 hours have been reviewed.  Renal function at baseline    MRI Brain W Wo:  images reviewed and interpreted. No abscess or obvious osseus involvement

## 2020-02-15 NOTE — ASSESSMENT & PLAN NOTE
The pt is a 43 yo M that presents for evaluation of a head abscess that is culture positive for MRSA- refractory to outpatient management with I&D at  and a 14 day course of Bactrim. He presents with associated HA and fatigue, but no other symptoms or neurologic deficits.     - Vancomycin dosing for skin and soft tissue infections completed 2/15 followed by bactrim, appreciate ID recommendations. Will have them comment again on management per family request. Follow up scheduled in clinic with Dr. Gonsalez on 2/20.   - Discussed with Neurosurgery and Radiology who reviewed imaging (Neurosurgery also evaluated patient on admission) and assured no bony involvement of abscess. MRI repeated today.   - Gen surgery performed I&D 2/10. Have asked General Surgery to re-evaluate today 2/15 for further recommendations on debridement.   Wound care following for dressing management

## 2020-02-15 NOTE — NURSING
Wound care performed to occipital region of head. Minimal serous drainage, bed of wound red, periwound intact.  Site cleansed with Vashe solution, packed with one iodoform packing strip, site covered with gauge, and secured with taped. No c/o discomfort at present time.

## 2020-02-15 NOTE — NURSING
Educated on importance of wound care drsg changes by staff; voiced understanding and compliant at present time. Patient refused am drsg change, and requested spouse to assist.

## 2020-02-15 NOTE — NURSING
Patient reported tubing to 18g iv catheter detached while repositioning in bed. Site assessed. Catheter in place  to L FA. No abnormalities to site. Patient ambulated to bathroom to retrieve towel prior to staff entering room and tracked blood on floor. Site cleansed and drsg changed, tubing secured,  room cleaned, and linen changed. MD notified. No distress noted. Denies discomfort.

## 2020-02-15 NOTE — PROGRESS NOTES
"[facility] Hospital Medicine  Progress Note    Patient Name: Luke Coley  MRN: [id number]  Patient Class: IP- Inpatient   Admission Date: 2/9/2020  Length of Stay: 6 days  Attending Physician: Ruthie Ramirez MD  Primary Care Provider: Koko Stockton Jr, MD    [facility] Medicine Team: [facility] 5 Joshua Negro MD    Subjective:     Principal Problem:Abscess        HPI:  The pt is a 43 yo M with a head abscess (cx positive for MRSA), recent ischemic stroke, PFO?, fibromyalgia, IBS, anxiety/depression, and prior history of SI that presents with a chief complaint of wound infection on the back of his head with associated HA. He reports that he was admitted to the hospital on 1/24-1/31 for a stroke which caused him to pass out and hit his head- which subsequently developed into an infection that grew MRSA. He states that he was placed on a 14 day course of Bactrim (which he finished yesterday) and is s/p I&D from  (5-6 days ago from presentation). Despite this, the abscess has gotten larger and he has lost more hair in that region. His ex wife (recent divorce) is an RN and took a look at the area- she advised him to go to the hospital for further evaluation. He reports that he has had chills, fatigue, nausea, HA, and diffuse body aches. He denies any focal weakness, sensory deficit, changes in vision, numbness/tingling, or hearing changes of recent. Describes the HA as a throbbing focused in the area of the abscess with radiation to the front of the head. He denies any current SI or HI. He does feel that he is depressed given his recent illnesses and divorce. He was an alcoholic- but he has since stopped for the last month. Recently completed rehab in [place].     In the ED- he was evaluated and found to be afebrile, WBC count of 5.5,  Lactate and procal wnl, and an MRI of the head- which was positive for "evolving right temporal-parieto-occipital subacute age infarct (likely " "watershed area of MCA/PCA) with interval development of moderate hemorrhage and moderate surrounding vasogenic edema.  No significant mass effect or midline shift. Additional evolving subacute age infarcts within the bilateral basal ganglia and periventricular white matter of the posterior right lateral ventricle." Of note a "small rim enhancing fluid collection overlying the posterosuperior midline calvarium, presumably an abscess given reported history.  No associated calvarial abnormalities."       .     Overview/Hospital Course:  The pt was admitted to hospital medicine on 02/09/2020 for MRSA head abscess and recent ischemic stroke with hemorrhagic conversion in the right temporal perieto- occipital region. Plan to continue IV vancomycin. General surgery consulted I&D performed 2/10. ID consulted for recs before discharge.    Interval History:   Overnight wife was extremely concerned about potential tracking of wound bed.     Have asked General Surgery to re-evaluate the wound to determine if it might benefit from further debridement.     Patient continues to have mild headaches but no fevers or any other complaints.     Review of Systems   Constitutional: Negative for chills and fever.   Respiratory: Negative for cough and shortness of breath.    Gastrointestinal: Negative for constipation, diarrhea, nausea and vomiting.   Musculoskeletal: Negative for arthralgias and myalgias.   Skin: Positive for wound.     Objective:     Vital Signs (Most Recent):  Temp: 98.2 °F (36.8 °C) (02/15/20 1200)  Pulse: 73 (02/15/20 1200)  Resp: 16 (02/15/20 1200)  BP: 118/64 (02/15/20 0800)  SpO2: 99 % (02/15/20 1200) Vital Signs (24h Range):  Temp:  [98 °F (36.7 °C)-98.2 °F (36.8 °C)] 98.2 °F (36.8 °C)  Pulse:  [63-73] 73  Resp:  [16-18] 16  SpO2:  [95 %-99 %] 99 %  BP: (118-144)/(64-88) 118/64     Weight: 97.5 kg (215 lb)  Body mass index is 29.99 kg/m².    Intake/Output Summary (Last 24 hours) at 2/15/2020 7687  Last data filed " at 2/15/2020 1200  Gross per 24 hour   Intake 960 ml   Output 3 ml   Net 957 ml      Physical Exam   Constitutional: He is oriented to person, place, and time.   Well appearing man sitting up in no distress   Cardiovascular: Normal rate and regular rhythm.   Pulmonary/Chest: Effort normal and breath sounds normal. No respiratory distress.   Abdominal: Soft. He exhibits no distension. There is no tenderness.   Neurological: He is alert and oriented to person, place, and time.   Skin:   Wound to crown of head with packing in place. Dressing removed. Clean and dry with no surrounding erythema.    Psychiatric: He has a normal mood and affect. His behavior is normal.   Nursing note and vitals reviewed.      Significant Labs: All pertinent labs within the past 24 hours have been reviewed.  Renal function at baseline    MRI Brain W Wo: images reviewed and interpreted. No abscess or obvious osseus involvement      Assessment/Plan:      * Abscess of the Head- MRSA positive   The pt is a 45 yo M that presents for evaluation of a head abscess that is culture positive for MRSA- refractory to outpatient management with I&D at  and a 14 day course of Bactrim. He presents with associated HA and fatigue, but no other symptoms or neurologic deficits.     - Vancomycin dosing for skin and soft tissue infections completed 2/15 followed by bactrim, appreciate ID recommendations. Will have them comment again on management per family request. Follow up scheduled in clinic with Dr. Gonsalez on 2/20.   - Discussed with Neurosurgery and Radiology who reviewed imaging (Neurosurgery also evaluated patient on admission) and assured no bony involvement of abscess. MRI repeated today.   - Gen surgery performed I&D 2/10. Have asked General Surgery to re-evaluate today 2/15 for further recommendations on debridement.   Wound care following for dressing management      History of drug abuse  Hx of polysubstance abuse and IVDU.  Will avoid picc line  and opiates.       Fibromyalgia  - Continue home meds  - patient home lyrica restarted      HLD (hyperlipidemia)  - Continue home meds        History of Seizure  - Continue home Depakote. Patient reports unclear indication but in setting of recent stroke will not discontinue. Can consider trial of removal as outpatient.       Ischemic stroke with subsequent hemmorhagic conversion   - Vascular consult placed from ED  - Vascular neurology reports [facility] imaging consistent with normal stroke evolution  - ASA continued per their recommendations oked heparin and lovenox for DVT ppx      Tobacco use disorder  - Counseling for nicotine cessation with respiratory therapy placed       Chronic pain syndrome  - Continue home meds  - Avoid opiates unless needed, discussed with wife and  given previous history of IVDU/ polysubstance abuse        Generalized anxiety disorder  - Continue home meds        VTE Risk Mitigation (From admission, onward)         Ordered     enoxaparin injection 40 mg  Daily      02/15/20 1634     IP VTE LOW RISK PATIENT  Once      02/09/20 1454     Place sequential compression device  Until discontinued      02/09/20 1454                      Joshua Negro MD  Department of Hospital Medicine   [facility]

## 2020-02-15 NOTE — PLAN OF CARE
Problem: Fall Injury Risk  Goal: Absence of Fall and Fall-Related Injury  Outcome: Ongoing, Progressing     Problem: Adult Inpatient Plan of Care  Goal: Plan of Care Review  Outcome: Ongoing, Progressing  Goal: Patient-Specific Goal (Individualization)  Outcome: Ongoing, Progressing  Goal: Absence of Hospital-Acquired Illness or Injury  Outcome: Ongoing, Progressing  Goal: Optimal Comfort and Wellbeing  Outcome: Ongoing, Progressing  Goal: Readiness for Transition of Care  Outcome: Ongoing, Progressing  Goal: Rounds/Family Conference  Outcome: Ongoing, Progressing     Problem: Infection  Goal: Infection Symptom Resolution  Outcome: Ongoing, Progressing     Problem: Wound  Goal: Optimal Wound Healing  Outcome: Ongoing, Progressing   Patient rested this shift without event. No adverse reaction to ABT. No c/o pain/discomfort. Srx2, CB near.

## 2020-02-15 NOTE — PROGRESS NOTES
Called Med Team 5, informed that ex-wife at bedside is doing wound care and discovered tunneling that was not present before. Very upset because he is supposed to go home tomorrow. Concerned that he is not receiving proper antibiotics, entire family have had resistant strain of MRSA to the oral Bactrim they are discharged on. Patient and ex-wife ask that someone come to bedside to discuss and view wound. He states he will try to come up and see patient soon, with another at this time. No new orders received, will inform patient and ex-wife of intent. EVE.

## 2020-02-16 LAB
BASOPHILS # BLD AUTO: 0.08 K/UL (ref 0–0.2)
BASOPHILS NFR BLD: 1.4 % (ref 0–1.9)
DIFFERENTIAL METHOD: ABNORMAL
EOSINOPHIL # BLD AUTO: 0.2 K/UL (ref 0–0.5)
EOSINOPHIL NFR BLD: 3.6 % (ref 0–8)
ERYTHROCYTE [DISTWIDTH] IN BLOOD BY AUTOMATED COUNT: 12.6 % (ref 11.5–14.5)
HCT VFR BLD AUTO: 37.5 % (ref 40–54)
HGB BLD-MCNC: 12.7 G/DL (ref 14–18)
IMM GRANULOCYTES # BLD AUTO: 0.02 K/UL (ref 0–0.04)
IMM GRANULOCYTES NFR BLD AUTO: 0.3 % (ref 0–0.5)
LYMPHOCYTES # BLD AUTO: 2 K/UL (ref 1–4.8)
LYMPHOCYTES NFR BLD: 33.1 % (ref 18–48)
MCH RBC QN AUTO: 32.2 PG (ref 27–31)
MCHC RBC AUTO-ENTMCNC: 33.9 G/DL (ref 32–36)
MCV RBC AUTO: 95 FL (ref 82–98)
MONOCYTES # BLD AUTO: 0.7 K/UL (ref 0.3–1)
MONOCYTES NFR BLD: 11.5 % (ref 4–15)
NEUTROPHILS # BLD AUTO: 3 K/UL (ref 1.8–7.7)
NEUTROPHILS NFR BLD: 50.1 % (ref 38–73)
NRBC BLD-RTO: 0 /100 WBC
PLATELET # BLD AUTO: 231 K/UL (ref 150–350)
PMV BLD AUTO: 10.5 FL (ref 9.2–12.9)
RBC # BLD AUTO: 3.95 M/UL (ref 4.6–6.2)
WBC # BLD AUTO: 5.9 K/UL (ref 3.9–12.7)

## 2020-02-16 PROCEDURE — 25000003 PHARM REV CODE 250: Performed by: STUDENT IN AN ORGANIZED HEALTH CARE EDUCATION/TRAINING PROGRAM

## 2020-02-16 PROCEDURE — 99233 PR SUBSEQUENT HOSPITAL CARE,LEVL III: ICD-10-PCS | Mod: ,,, | Performed by: INTERNAL MEDICINE

## 2020-02-16 PROCEDURE — 99232 SBSQ HOSP IP/OBS MODERATE 35: CPT | Mod: ,,, | Performed by: INTERNAL MEDICINE

## 2020-02-16 PROCEDURE — 11000001 HC ACUTE MED/SURG PRIVATE ROOM

## 2020-02-16 PROCEDURE — 99232 PR SUBSEQUENT HOSPITAL CARE,LEVL II: ICD-10-PCS | Mod: ,,, | Performed by: INTERNAL MEDICINE

## 2020-02-16 PROCEDURE — 36415 COLL VENOUS BLD VENIPUNCTURE: CPT

## 2020-02-16 PROCEDURE — 99233 SBSQ HOSP IP/OBS HIGH 50: CPT | Mod: ,,, | Performed by: INTERNAL MEDICINE

## 2020-02-16 PROCEDURE — 85025 COMPLETE CBC W/AUTO DIFF WBC: CPT

## 2020-02-16 RX ADMIN — MUPIROCIN: 20 OINTMENT TOPICAL at 08:02

## 2020-02-16 RX ADMIN — ASPIRIN 325 MG ORAL TABLET 325 MG: 325 PILL ORAL at 10:02

## 2020-02-16 RX ADMIN — DULOXETINE HYDROCHLORIDE 60 MG: 60 CAPSULE, DELAYED RELEASE ORAL at 10:02

## 2020-02-16 RX ADMIN — SULFAMETHOXAZOLE AND TRIMETHOPRIM 1 TABLET: 800; 160 TABLET ORAL at 10:02

## 2020-02-16 RX ADMIN — MIRTAZAPINE 15 MG: 15 TABLET, FILM COATED ORAL at 08:02

## 2020-02-16 RX ADMIN — ATORVASTATIN CALCIUM 10 MG: 10 TABLET, FILM COATED ORAL at 08:02

## 2020-02-16 RX ADMIN — PREGABALIN 200 MG: 50 CAPSULE ORAL at 10:02

## 2020-02-16 RX ADMIN — PREGABALIN 200 MG: 50 CAPSULE ORAL at 03:02

## 2020-02-16 RX ADMIN — DIVALPROEX SODIUM 500 MG: 250 TABLET, DELAYED RELEASE ORAL at 10:02

## 2020-02-16 RX ADMIN — DIVALPROEX SODIUM 500 MG: 250 TABLET, DELAYED RELEASE ORAL at 08:02

## 2020-02-16 RX ADMIN — SULFAMETHOXAZOLE AND TRIMETHOPRIM 1 TABLET: 800; 160 TABLET ORAL at 08:02

## 2020-02-16 RX ADMIN — PREGABALIN 200 MG: 50 CAPSULE ORAL at 08:02

## 2020-02-16 NOTE — ASSESSMENT & PLAN NOTE
44 year old with recent ischemic CVA who presented with head abscess after a fall.  Cultures of 1/28 during most recent hospitalization were + for MRSA.   Recently I&D at [facility] which failed to respond to outpatient Bactrim.  MRI on admit showed abscess over posterosuperior midline calvarium.  He underwent repeat bedside I&D on 2/10.  No cultures sent.  Blood cultures of 2/9 negative.   He has now had 7 days of IV vancomycin.  Pictures of the wound were reviewed.  The wound was examined this morning.  It actually doesn't look acutely infected.  MRI did not show any continued abscess and there is no bony involvement.  The surrounding skin is hard, dry and crusty and I suspect this wound will take a long time to heal.  He will need close follow up with wound care.  He may follow up in ID clinic as well.  He should complete an additional 7 days of bactrim DS bid.      ID will sign off.

## 2020-02-16 NOTE — CONSULTS
Therapy with IV Vancomycin complete and/or consult discontinued by provider. Pharmacy will sign off, please re-consult as needed.    Rosamaria Kurtz, PharmD, BCPS  [phone]

## 2020-02-16 NOTE — PLAN OF CARE
Asked by primary team to see pt's wound.  I did a bedside unroofing on 2/10.  Wound looks to be healing well, no undrained pockets or devitalized tissue seen.  Would not benefit from further debridement.  Continue daily dressing changes and abx per ID.    Jose Guadalupe Chamorro MD  General Surgery, PGY-2  [phone]

## 2020-02-16 NOTE — PLAN OF CARE
Problem: Fall Injury Risk  Goal: Absence of Fall and Fall-Related Injury  Outcome: Ongoing, Not Progressing     Problem: Adult Inpatient Plan of Care  Goal: Plan of Care Review  Outcome: Ongoing, Not Progressing     Problem: Infection  Goal: Infection Symptom Resolution  Outcome: Ongoing, Not Progressing     Problem: Wound  Goal: Optimal Wound Healing  Outcome: Ongoing, Not Progressing    AAOx4. Denies discomfort at present time. Continues with iv atx,; afebrile. Wound care maintained daily. Contact precautions maintained. No distress noted.
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
## 2020-02-16 NOTE — SUBJECTIVE & OBJECTIVE
Interval History:   No issues overnight. MRI head performed with no evidence of osteo or abscess.     Discussed with ID and General Surgery teams who do not recommend any changes to current management.     On bactrim today.     Patient frequently leaves room to visit his daughter who also has MRSA infection. Not ideal. Educated patient on need to stay in room to prevent spreading of infection and acquisition of new infection.     Review of Systems   Eyes: Negative for visual disturbance.   Respiratory: Negative for cough and shortness of breath.    Gastrointestinal: Negative for constipation, nausea and vomiting.   Genitourinary: Negative for difficulty urinating and dysuria.   Skin: Positive for wound.   Neurological: Positive for headaches.     Objective:     Vital Signs (Most Recent):  Temp: (pt unavailable ) (02/16/20 1200)  Pulse: (pt unavailable ) (02/16/20 1200)  Resp: (pt unavailable ) (02/16/20 1200)  BP: (pt unavailable ) (02/16/20 1200)  SpO2: (pt unavailable ) (02/16/20 1200) Vital Signs (24h Range):  Temp:  [97.5 °F (36.4 °C)-98.8 °F (37.1 °C)] 98 °F (36.7 °C)  Pulse:  [70-78] 78  Resp:  [14-18] 17  SpO2:  [96 %-99 %] 98 %  BP: (103-139)/(67-93) 131/88     Weight: 97.5 kg (215 lb)  Body mass index is 29.99 kg/m².    Intake/Output Summary (Last 24 hours) at 2/16/2020 1439  Last data filed at 2/16/2020 0900  Gross per 24 hour   Intake 480 ml   Output --   Net 480 ml      Physical Exam   Constitutional: He is oriented to person, place, and time.   Cardiovascular: Normal rate and regular rhythm.   Pulmonary/Chest: Effort normal. No respiratory distress.   Abdominal: Soft. Bowel sounds are normal. He exhibits no distension. There is no tenderness.   Neurological: He is alert and oriented to person, place, and time.   Skin: Skin is warm and dry.   Open wound to crown of head with good granulation tissue seen after removal of packing. No surrounding erythema or fluctuance.    Nursing note and vitals  reviewed.      Significant Labs: All pertinent labs within the past 24 hours have been reviewed.  Inflammatory markers unrevealing, renal function at baseline.

## 2020-02-16 NOTE — NURSING
Noncompliant with contact precautions. Educated on need to maintain; voiced understanding but frequently leaves room to visit family member in facility. Drsg in place to wound site. Awaiting patients return for wound care. MD notified.

## 2020-02-16 NOTE — PROGRESS NOTES
"[facility] Hospital Medicine  Progress Note    Patient Name: Luke Coley  MRN: [id number]  Patient Class: IP- Inpatient   Admission Date: 2/9/2020  Length of Stay: 7 days  Attending Physician: Ruthie Ramirez MD  Primary Care Provider: Koko Stockton Jr, MD    [facility] Medicine Team: [facility] 5 Joshua Negro MD    Subjective:     Principal Problem:Abscess        HPI:  The pt is a 45 yo M with a head abscess (cx positive for MRSA), recent ischemic stroke, PFO?, fibromyalgia, IBS, anxiety/depression, and prior history of SI that presents with a chief complaint of wound infection on the back of his head with associated HA. He reports that he was admitted to the hospital on 1/24-1/31 for a stroke which caused him to pass out and hit his head- which subsequently developed into an infection that grew MRSA. He states that he was placed on a 14 day course of Bactrim (which he finished yesterday) and is s/p I&D from  (5-6 days ago from presentation). Despite this, the abscess has gotten larger and he has lost more hair in that region. His ex wife (recent divorce) is an RN and took a look at the area- she advised him to go to the hospital for further evaluation. He reports that he has had chills, fatigue, nausea, HA, and diffuse body aches. He denies any focal weakness, sensory deficit, changes in vision, numbness/tingling, or hearing changes of recent. Describes the HA as a throbbing focused in the area of the abscess with radiation to the front of the head. He denies any current SI or HI. He does feel that he is depressed given his recent illnesses and divorce. He was an alcoholic- but he has since stopped for the last month. Recently completed rehab in [place].     In the ED- he was evaluated and found to be afebrile, WBC count of 5.5,  Lactate and procal wnl, and an MRI of the head- which was positive for "evolving right temporal-parieto-occipital subacute age infarct (likely " "watershed area of MCA/PCA) with interval development of moderate hemorrhage and moderate surrounding vasogenic edema.  No significant mass effect or midline shift. Additional evolving subacute age infarcts within the bilateral basal ganglia and periventricular white matter of the posterior right lateral ventricle." Of note a "small rim enhancing fluid collection overlying the posterosuperior midline calvarium, presumably an abscess given reported history.  No associated calvarial abnormalities."       .     Overview/Hospital Course:  The pt was admitted to hospital medicine on 02/09/2020 for MRSA head abscess and recent ischemic stroke with hemorrhagic conversion in the right temporal perieto- occipital region. Plan to continue IV vancomycin. General surgery consulted I&D performed 2/10. ID consulted for recs before discharge.    Interval History:   No issues overnight. MRI head performed with no evidence of osteo or abscess.     Discussed with ID and General Surgery teams who do not recommend any changes to current management.     On bactrim today.     Patient frequently leaves room to visit his daughter who also has MRSA infection. Not ideal. Educated patient on need to stay in room to prevent spreading of infection and acquisition of new infection.     Review of Systems   Eyes: Negative for visual disturbance.   Respiratory: Negative for cough and shortness of breath.    Gastrointestinal: Negative for constipation, nausea and vomiting.   Genitourinary: Negative for difficulty urinating and dysuria.   Skin: Positive for wound.   Neurological: Positive for headaches.     Objective:     Vital Signs (Most Recent):  Temp: (pt unavailable ) (02/16/20 1200)  Pulse: (pt unavailable ) (02/16/20 1200)  Resp: (pt unavailable ) (02/16/20 1200)  BP: (pt unavailable ) (02/16/20 1200)  SpO2: (pt unavailable ) (02/16/20 1200) Vital Signs (24h Range):  Temp:  [97.5 °F (36.4 °C)-98.8 °F (37.1 °C)] 98 °F (36.7 °C)  Pulse:  [70-78] " 78  Resp:  [14-18] 17  SpO2:  [96 %-99 %] 98 %  BP: (103-139)/(67-93) 131/88     Weight: 97.5 kg (215 lb)  Body mass index is 29.99 kg/m².    Intake/Output Summary (Last 24 hours) at 2/16/2020 1439  Last data filed at 2/16/2020 0900  Gross per 24 hour   Intake 480 ml   Output --   Net 480 ml      Physical Exam   Constitutional: He is oriented to person, place, and time.   Cardiovascular: Normal rate and regular rhythm.   Pulmonary/Chest: Effort normal. No respiratory distress.   Abdominal: Soft. Bowel sounds are normal. He exhibits no distension. There is no tenderness.   Neurological: He is alert and oriented to person, place, and time.   Skin: Skin is warm and dry.   Open wound to crown of head with good granulation tissue seen after removal of packing. No surrounding erythema or fluctuance.    Nursing note and vitals reviewed.      Significant Labs: All pertinent labs within the past 24 hours have been reviewed.  Inflammatory markers unrevealing, renal function at baseline.       Assessment/Plan:      * Abscess of the Head- MRSA positive   The pt is a 43 yo M that presents for evaluation of a head abscess that is culture positive for MRSA- refractory to outpatient management with I&D at  and a 14 day course of Bactrim. He presents with associated HA and fatigue, but no other symptoms or neurologic deficits.     - Vancomycin dosing for skin and soft tissue infections completed 2/15 followed by bactrim, appreciate ID recommendations. ID again saw patient 2/16, recommend no change in management. Follow up scheduled in clinic with Dr. Gonsalez on 2/20.   - Discussed with Neurosurgery and Radiology who reviewed imaging (Neurosurgery also evaluated patient on admission) and assured no bony involvement of abscess. MRI repeated 2/15  - Gen surgery performed I&D 2/10. Re-evaluated 2/16. Do not recommend further debridement.   Wound care following for dressing management, will dc with home health wound care      History of  drug abuse  Hx of polysubstance abuse and IVDU.  Will avoid picc line and opiates.       Fibromyalgia  - Continue home meds  - patient home lyrica restarted      HLD (hyperlipidemia)  - Continue home meds        History of Seizure  - Continue home Depakote. Patient reports unclear indication but in setting of recent stroke will not discontinue. Can consider trial of removal as outpatient.       Ischemic stroke with subsequent hemmorhagic conversion   - Vascular consult placed from ED  - Vascular neurology reports [facility] imaging consistent with normal stroke evolution  - ASA continued per their recommendations oked heparin and lovenox for DVT ppx      Tobacco use disorder  - Counseling for nicotine cessation with respiratory therapy placed       Chronic pain syndrome  - Continue home meds  - Avoid opiates unless needed, discussed with wife and  given previous history of IVDU/ polysubstance abuse        Generalized anxiety disorder  - Continue home meds        VTE Risk Mitigation (From admission, onward)         Ordered     enoxaparin injection 40 mg  Daily      02/15/20 1634     IP VTE LOW RISK PATIENT  Once      02/09/20 1454     Place sequential compression device  Until discontinued      02/09/20 1454                      Joshua Negro MD  Department of Hospital Medicine   [facility]

## 2020-02-16 NOTE — ASSESSMENT & PLAN NOTE
The pt is a 43 yo M that presents for evaluation of a head abscess that is culture positive for MRSA- refractory to outpatient management with I&D at  and a 14 day course of Bactrim. He presents with associated HA and fatigue, but no other symptoms or neurologic deficits.     - Vancomycin dosing for skin and soft tissue infections completed 2/15 followed by bactrim, appreciate ID recommendations. ID again saw patient 2/16, recommend no change in management. Follow up scheduled in clinic with Dr. Gonsalez on 2/20.   - Discussed with Neurosurgery and Radiology who reviewed imaging (Neurosurgery also evaluated patient on admission) and assured no bony involvement of abscess. MRI repeated 2/15  - Gen surgery performed I&D 2/10. Re-evaluated 2/16. Do not recommend further debridement.   Wound care following for dressing management, will dc with home health wound care
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
## 2020-02-16 NOTE — PLAN OF CARE
Pt aaox4. No c/o pain, contact precautions maintained. Medications reviewed. Pt ambulates in [place] regularly despite being on contact precaution. Pt's daughter is on the 3rd floor and he visits her, per pt. He is free from falls/injuries. Bed in lowest position with call bell in reach. tm

## 2020-02-16 NOTE — PLAN OF CARE
[facility]    HOME HEALTH ORDERS  FACE TO FACE ENCOUNTER    Patient Name: Luke Coley  YOB: 1975    PCP: Koko Stockton Jr, MD   PCP Address: [address] [place] [address] / [facility]  PCP Phone Number: [phone]  PCP Fax: [phone]    Encounter Date: 02/17/2020    Admit to Home Health    Diagnoses:  Active Hospital Problems    Diagnosis  POA    *Abscess of the Head- MRSA positive  [L02.91]  Yes     Chronic    Alteration in skin integrity [R23.9]  Yes    History of stroke [Z86.73]  Not Applicable    History of drug abuse [F19.11]  Yes     IVDU       Ischemic stroke with subsequent hemmorhagic conversion  [I63.9]  Unknown    History of Seizure [R56.9]  Unknown    HLD (hyperlipidemia) [E78.5]  Unknown    Fibromyalgia [M79.7]  Unknown    Embolic stroke involving right middle cerebral artery [I63.411]  Yes    Tobacco use disorder [F17.200]  Yes    Chronic pain syndrome [G89.4]  Yes    Generalized anxiety disorder [F41.1]  Yes      Resolved Hospital Problems   No resolved problems to display.       Future Appointments   Date Time Provider Department Center   2/20/2020  1:00 PM Wu Gonsalez MD [facility]     Follow-up Information     Koko Stockton Jr, MD On 2/17/2020.    Specialty:  Family Medicine  Why:  at 1:15pm; PCP hospital follow up appointment  Contact information:  [address] [place] [address]  [phone]             Wu Gonsalez MD On 2/20/2020.    Specialty:  Infectious Diseases  Contact information:  [address]  [facility] [address]  [phone]             [facility] - Wound Care. Schedule an appointment as soon as possible for a visit in 2 weeks.    Specialty:  Wound Care  Contact information:  [address]  [facility] [id number] [phone]  Additional information:  5th Floor Clinic Gideon Tejada MD. Schedule an appointment  as soon as possible for a visit in 1 month.    Specialties:  Cardiology, INTERVENTIONAL CARDIOLOGY  Contact information:  [address]  [facility] [address]  [phone]                     I have seen and examined this patient face to face today. My clinical findings that support the need for the home health skilled services and home bound status are the following:  Weakness/numbness causing balance and gait disturbance due to Stroke making it taxing to leave home.    Allergies:Review of patient's allergies indicates:  No Known Allergies    Diet: regular diet    Activities: activity as tolerated    Nursing:   SN to complete comprehensive assessment including routine vital signs. Instruct on disease process and s/s of complications to report to MD. Review/verify medication list sent home with the patient at time of discharge  and instruct patient/caregiver as needed. Frequency may be adjusted depending on start of care date.      CONSULTS:    Physical Therapy to evaluate and treat. Evaluate for home safety and equipment needs; Establish/upgrade home exercise program. Perform / instruct on therapeutic exercises, gait training, transfer training, and Range of Motion.  Occupational Therapy to evaluate and treat. Evaluate home environment for safety and equipment needs. Perform/Instruct on transfers, ADL training, ROM, and therapeutic exercises.  Speech Therapy  to evaluate and treat for  Language and Cognition.    MISCELLANEOUS CARE:  Wound Care Orders:  yes:  Abcess s/p I&D:    Location:   [place] of head        Irrigate with saline or wound spray     Loosely fill with Iodoform gauze, cavilon skin barrier to periwound.     Cover with gauze dressing    Daily dressing changes.       Medications: Review discharge medications with patient and family and provide education.      Current Discharge Medication List      START taking these medications    Details   mupirocin (BACTROBAN) 2 % ointment Apply by nasal route 2  (two) times daily for 3 days  Qty: 22 g, Refills: 0         CONTINUE these medications which have CHANGED    Details   sulfamethoxazole-trimethoprim 800-160mg (BACTRIM DS) 800-160 mg Tab Take 1 tablet by mouth 2 (two) times daily. for 9 days  Qty: 18 tablet, Refills: 0         CONTINUE these medications which have NOT CHANGED    Details   aspirin 325 MG tablet Take 1 tablet (325 mg total) by mouth once daily.  Qty: 30 tablet, Refills: 0      atorvastatin (LIPITOR) 10 MG tablet Take 1 tablet (10 mg total) by mouth once daily.  Qty: 30 tablet, Refills: 0      baclofen (LIORESAL) 5 mg Tab tablet Take 1 tablet (5 mg total) by mouth 3 (three) times daily as needed.  Qty: 20 tablet, Refills: 0      cloNIDine 0.1 mg/24 hr td ptwk (CATAPRES) 0.1 mg/24 hr Place 1 patch onto the skin every 7 days.  Qty: 4 patch, Refills: 0      DULoxetine (CYMBALTA) 60 MG capsule Take 1 capsule (60 mg total) by mouth once daily.  Qty: 30 capsule, Refills: 0      mirtazapine (REMERON) 15 MG tablet Take 1 tablet (15 mg total) by mouth every evening.  Qty: 30 tablet, Refills: 0      pregabalin (LYRICA) 200 MG Cap Take 200 mg by mouth 3 (three) times daily.      divalproex (DEPAKOTE) 500 MG TbEC Take 1 tablet (500 mg total) by mouth every 12 (twelve) hours.  Qty: 60 tablet, Refills: 0         STOP taking these medications       buPROPion (WELLBUTRIN XL) 300 MG 24 hr tablet Comments:   Reason for Stopping:         ibuprofen (ADVIL,MOTRIN) 600 MG tablet Comments:   Reason for Stopping:               I certify that this patient is confined to his home and needs intermittent skilled nursing care, physical therapy, speech therapy and occupational therapy.

## 2020-02-17 VITALS
OXYGEN SATURATION: 99 % | WEIGHT: 215 LBS | DIASTOLIC BLOOD PRESSURE: 91 MMHG | TEMPERATURE: 99 F | HEART RATE: 81 BPM | SYSTOLIC BLOOD PRESSURE: 144 MMHG | HEIGHT: 71 IN | RESPIRATION RATE: 16 BRPM | BODY MASS INDEX: 30.1 KG/M2

## 2020-02-17 LAB
BASOPHILS # BLD AUTO: 0.07 K/UL (ref 0–0.2)
BASOPHILS NFR BLD: 1.1 % (ref 0–1.9)
DIFFERENTIAL METHOD: ABNORMAL
EOSINOPHIL # BLD AUTO: 0.2 K/UL (ref 0–0.5)
EOSINOPHIL NFR BLD: 2.9 % (ref 0–8)
ERYTHROCYTE [DISTWIDTH] IN BLOOD BY AUTOMATED COUNT: 12.7 % (ref 11.5–14.5)
HCT VFR BLD AUTO: 41.8 % (ref 40–54)
HGB BLD-MCNC: 14.1 G/DL (ref 14–18)
IMM GRANULOCYTES # BLD AUTO: 0.02 K/UL (ref 0–0.04)
IMM GRANULOCYTES NFR BLD AUTO: 0.3 % (ref 0–0.5)
LYMPHOCYTES # BLD AUTO: 2.4 K/UL (ref 1–4.8)
LYMPHOCYTES NFR BLD: 38.8 % (ref 18–48)
MCH RBC QN AUTO: 32 PG (ref 27–31)
MCHC RBC AUTO-ENTMCNC: 33.7 G/DL (ref 32–36)
MCV RBC AUTO: 95 FL (ref 82–98)
MONOCYTES # BLD AUTO: 0.7 K/UL (ref 0.3–1)
MONOCYTES NFR BLD: 11.2 % (ref 4–15)
NEUTROPHILS # BLD AUTO: 2.8 K/UL (ref 1.8–7.7)
NEUTROPHILS NFR BLD: 45.7 % (ref 38–73)
NRBC BLD-RTO: 0 /100 WBC
PLATELET # BLD AUTO: 243 K/UL (ref 150–350)
PMV BLD AUTO: 10.6 FL (ref 9.2–12.9)
RBC # BLD AUTO: 4.4 M/UL (ref 4.6–6.2)
WBC # BLD AUTO: 6.18 K/UL (ref 3.9–12.7)

## 2020-02-17 PROCEDURE — 25000003 PHARM REV CODE 250: Performed by: STUDENT IN AN ORGANIZED HEALTH CARE EDUCATION/TRAINING PROGRAM

## 2020-02-17 PROCEDURE — 99239 HOSP IP/OBS DSCHRG MGMT >30: CPT | Mod: ,,, | Performed by: STUDENT IN AN ORGANIZED HEALTH CARE EDUCATION/TRAINING PROGRAM

## 2020-02-17 PROCEDURE — 36415 COLL VENOUS BLD VENIPUNCTURE: CPT

## 2020-02-17 PROCEDURE — 85025 COMPLETE CBC W/AUTO DIFF WBC: CPT

## 2020-02-17 PROCEDURE — 99239 PR HOSPITAL DISCHARGE DAY,>30 MIN: ICD-10-PCS | Mod: ,,, | Performed by: STUDENT IN AN ORGANIZED HEALTH CARE EDUCATION/TRAINING PROGRAM

## 2020-02-17 RX ADMIN — ASPIRIN 325 MG ORAL TABLET 325 MG: 325 PILL ORAL at 09:02

## 2020-02-17 RX ADMIN — PREGABALIN 200 MG: 50 CAPSULE ORAL at 09:02

## 2020-02-17 RX ADMIN — PREGABALIN 200 MG: 50 CAPSULE ORAL at 02:02

## 2020-02-17 RX ADMIN — ATORVASTATIN CALCIUM 10 MG: 10 TABLET, FILM COATED ORAL at 09:02

## 2020-02-17 RX ADMIN — DULOXETINE HYDROCHLORIDE 60 MG: 60 CAPSULE, DELAYED RELEASE ORAL at 09:02

## 2020-02-17 RX ADMIN — CLONIDINE 1 PATCH: 0.1 PATCH TRANSDERMAL at 09:02

## 2020-02-17 RX ADMIN — DIVALPROEX SODIUM 500 MG: 250 TABLET, DELAYED RELEASE ORAL at 09:02

## 2020-02-17 RX ADMIN — SULFAMETHOXAZOLE AND TRIMETHOPRIM 1 TABLET: 800; 160 TABLET ORAL at 09:02

## 2020-02-17 NOTE — PLAN OF CARE
02/17/20 1100   Discharge Reassessment   Assessment Type Discharge Planning Reassessment   Do you have any problems affording any of your prescribed medications? No   Discharge Plan A Home Health   Discharge Plan B Home   DME Needed Upon Discharge  none   Anticipated Discharge Disposition Home-Health   Can the patient/caregiver answer the patient profile reliably? Yes, cognitively intact   How does the patient rate their overall health at the present time? Fair   Describe the patient's ability to walk at the present time. No restrictions   How often would a person be available to care for the patient? Whenever needed   Number of comorbid conditions (as recorded on the chart) Five or more   Post-Acute Status   Post-Acute Authorization Home Health/Hospice     Patient off the unit when CM rounded. Plan to discharge patient home with HH today. Previously scheduled appointment with Dr. Koko Stockton (PCP) on 2/17/2020 at 1315 rescheduled for 2/20/2020 at 1445.

## 2020-02-17 NOTE — PLAN OF CARE
Patient accepted by [facility]. [facility] will admit patient on Wednesday.      02/17/20 0946   Post-Acute Status   Post-Acute Authorization Home Health/Hospice   Home Health/Hospice Status Set-up Complete     11:13 AM  IKER informed by [facility] rep that Anupam (in partnership with [facility]) can see patient tomorrow. IKER sent  referral to Anupam.     IKER left message informing spouse of this information.    Maxine Newman, ORALIA  [facility]

## 2020-02-17 NOTE — PLAN OF CARE
Problem: Adult Inpatient Plan of Care  Goal: Plan of Care Review  Outcome: Ongoing, Not Progressing     Problem: Infection  Goal: Infection Symptom Resolution  Outcome: Ongoing, Not Progressing     Problem: Wound  Goal: Optimal Wound Healing  Outcome: Ongoing, Not Progressing       AAOx4. Remains afebrile. No complaints voiced. Denies discomfort at present time. Continues with po atx. No a/r noted.

## 2020-02-17 NOTE — PROGRESS NOTES
Pt is discharged. D/c instruction explained and given to pt and pt verbalized understanding of d/c instruction. Wound care on occipital done as ordered. Vss. Pt denied any discomfort. Pt received antibiotic for home use. Pt ambulated off unit independently.

## 2020-02-17 NOTE — DISCHARGE SUMMARY
"[facility] Hospital Medicine  Discharge Summary      Patient Name: Luke Coley  MRN: [id number]  Admission Date: 2/9/2020  Hospital Length of Stay: 8 days  Discharge Date and Time: No discharge date for patient encounter.  Attending Physician: Aldair Campo MD   Discharging Provider: Joshua Negro MD  Primary Care Provider: Koko Stockton Jr, MD  Hospital Medicine Team: [facility] HOSP MED 5 Joshua Negro MD    HPI:   The pt is a 43 yo M with a head abscess (cx positive for MRSA), recent ischemic stroke, PFO?, fibromyalgia, IBS, anxiety/depression, and prior history of SI that presents with a chief complaint of wound infection on the back of his head with associated HA. He reports that he was admitted to the hospital on 1/24-1/31 for a stroke which caused him to pass out and hit his head- which subsequently developed into an infection that grew MRSA. He states that he was placed on a 14 day course of Bactrim (which he finished yesterday) and is s/p I&D from  (5-6 days ago from presentation). Despite this, the abscess has gotten larger and he has lost more hair in that region. His ex wife (recent divorce) is an RN and took a look at the area- she advised him to go to the hospital for further evaluation. He reports that he has had chills, fatigue, nausea, HA, and diffuse body aches. He denies any focal weakness, sensory deficit, changes in vision, numbness/tingling, or hearing changes of recent. Describes the HA as a throbbing focused in the area of the abscess with radiation to the front of the head. He denies any current SI or HI. He does feel that he is depressed given his recent illnesses and divorce. He was an alcoholic- but he has since stopped for the last month. Recently completed rehab in [place].     In the ED- he was evaluated and found to be afebrile, WBC count of 5.5,  Lactate and procal wnl, and an MRI of the head- which was positive for "evolving right " "temporal-parieto-occipital subacute age infarct (likely watershed area of MCA/PCA) with interval development of moderate hemorrhage and moderate surrounding vasogenic edema.  No significant mass effect or midline shift. Additional evolving subacute age infarcts within the bilateral basal ganglia and periventricular white matter of the posterior right lateral ventricle." Of note a "small rim enhancing fluid collection overlying the posterosuperior midline calvarium, presumably an abscess given reported history.  No associated calvarial abnormalities."       .     * No surgery found *      Hospital Course:   The pt was admitted to hospital medicine on 02/09/2020 for MRSA head abscess and recent ischemic stroke with hemorrhagic conversion in the right temporal perieto- occipital region.     CVA- Evaluated by Vascular Neurology who reviewed imaging. Findings consistent with post stroke evolution. They recommended no change in management. Discussed PFO closure with cardiology, they recommend outpatient consultation with Interventional Cardiology in setting of active MRSA infection and do not recommend inpatient closure.     MRSA abscess- s/p course of bactrim and I and D at outside hospital without improvement. Seen by Neurosurgery followed by General Surgery. I and D performed 2/10. Evaluated subsequently multiple times at patient request and recommended no further debridement. Multiple MRIs taken, reviewed with Radiology with no indication of osteo. Inflammatory markers repeatedly negative. ID consulted who recommended initial 7 days of Vancomycin with completion of 14 day course with Bactrim. Wound care followed patient throughout hospital stay but patient's ex wife performed most of the wound care herself at patient's request.     He will be discharged with follow up in Infectious Disease Clinic and with his PCP. Home Health will assist with wound care. Wound care clinic referral and Interventional Cardiology clinic " referral also placed.       Patient not seen by me on day of discharge due to being out of his room (Frequently left room during hospital stay despite contact precautions). Evaluated by staff Dr. Campo on day of discharge.      Consults:   Consults (From admission, onward)        Status Ordering Provider     Inpatient consult to Cardiology  Once     Provider:  (Not yet assigned)    Completed J CARLOS SHORT     Inpatient consult to General Surgery  Once     Provider:  (Not yet assigned)    Completed AKANKSHA PICHARDO     Inpatient consult to Infectious Diseases  Once     Provider:  (Not yet assigned)    Completed YAMILETH HARRY     Inpatient consult to Infectious Diseases  Once     Provider:  (Not yet assigned)    Completed IZABEL RADFORD     Inpatient consult to Vascular (Stroke) Neurology  Once     Provider:  (Not yet assigned)    Completed LORENE ALVARADO     Pharmacy to dose Vancomycin consult  Once     Provider:  (Not yet assigned)    Completed DONALDO TRAVIS          No new Assessment & Plan notes have been filed under this hospital service since the last note was generated.  Service: Hospital Medicine    Final Active Diagnoses:    Diagnosis Date Noted POA    PRINCIPAL PROBLEM:  Abscess of the Head- MRSA positive  [L02.91] 02/09/2020 Yes     Chronic    Alteration in skin integrity [R23.9] 02/13/2020 Yes    History of stroke [Z86.73]  Not Applicable    History of drug abuse [F19.11] 02/12/2020 Yes    Ischemic stroke with subsequent hemmorhagic conversion  [I63.9] 02/09/2020 Unknown    History of Seizure [R56.9] 02/09/2020 Unknown    HLD (hyperlipidemia) [E78.5] 02/09/2020 Unknown    Fibromyalgia [M79.7] 02/09/2020 Unknown    Embolic stroke involving right middle cerebral artery [I63.411] 01/24/2020 Yes    Tobacco use disorder [F17.200] 09/18/2018 Yes    Chronic pain syndrome [G89.4] 07/07/2017 Yes    Generalized anxiety disorder [F41.1]  Yes      Problems Resolved During this Admission:        Discharged Condition: stable    Disposition: Home-Health Care [facility]    Follow Up:  Follow-up Information     Koko Stockton Jr, MD On 2/20/2020.    Specialty:  Family Medicine  Why:  at 2:45pm; PCP hospital follow up appointment  Contact information:  [address] [facility] [address]  [phone]             Wu Gonsalez MD On 2/20/2020.    Specialty:  Infectious Diseases  Contact information:  [address] [facility] [address]  [phone]             [facility] - Wound Care. Schedule an appointment as soon as possible for a visit in 2 weeks.    Specialty:  Wound Care  Contact information:  [address] [facility] [id number] [phone]  Additional information:  5th Floor Clinic Gideon Tejada MD. Schedule an appointment as soon as possible for a visit in 1 month.    Specialties:  Cardiology, INTERVENTIONAL CARDIOLOGY  Contact information:  [address]  [place] [address]  [phone]             [facility] On 2/18/2020.    Specialties:  DME Provider, Home Health Services  Why:  to provide home health services  Contact information:  [address] [place] [address]  [phone]                 Patient Instructions:      Ambulatory referral/consult to Wound Clinic   Standing Status: Future   Referral Priority: Routine Referral Type: Consultation   Referral Reason: Specialty Services Required   Requested Specialty: Wound Care   Number of Visits Requested: 1     Ambulatory referral/consult to Interventional Cardiology   Standing Status: Future   Referral Priority: Routine Referral Type: Consultation   Referral Reason: Specialty Services Required   Requested Specialty: INTERVENTIONAL CARDIOLOGY   Number of Visits Requested: 1       Significant Diagnostic Studies: Labs:   BMP: No results for input(s): GLU, NA, K, CL, CO2, BUN, CREATININE, CALCIUM, MG in the last 48 hours., CMP No results for input(s): NA, K,  CL, CO2, GLU, BUN, CREATININE, CALCIUM, PROT, ALBUMIN, BILITOT, ALKPHOS, AST, ALT, ANIONGAP, ESTGFRAFRICA, EGFRNONAA in the last 48 hours. and CBC   Recent Labs   Lab 02/16/20  0725 02/17/20  0718   WBC 5.90 6.18   HGB 12.7* 14.1   HCT 37.5* 41.8    243     Radiology: MRI:   Brain W WO 2/9:  1. Evolving right temporal-parieto-occipital  subacute age infarct (likely watershed area of MCA/PCA) with interval development of moderate hemorrhage and moderate surrounding vasogenic edema.  No significant mass effect or midline shift.  2. Additional evolving subacute age infarcts within the bilateral basal ganglia and periventricular white matter of the posterior right lateral ventricle.  3. Small rim enhancing fluid collection overlying the posterosuperior midline calvarium, presumably an abscess given reported history.  No associated calvarial abnormalities.    Brain W WO 2/15:   1.  Interval drainage of scalp abscess at the vertex, with wound and bandage material at abscess site.  No definite underlying osseous abnormality or evidence of intracranial extension.    2.  Redemonstration of an evolving right temporal-parietal-occipital subacute age infarct with associated hemorrhagic conversion and regional mass effect.  No significant midline shift or associated hydrocephalus.    3.  Additional evolving subacute infarcts within bilateral basal ganglia.    Pending Diagnostic Studies:     None         Medications:  Reconciled Home Medications:      Medication List      START taking these medications    mupirocin 2 % ointment  Commonly known as:  BACTROBAN  Apply by nasal route 2 (two) times daily for 3 days        CHANGE how you take these medications    sulfamethoxazole-trimethoprim 800-160mg 800-160 mg Tab  Commonly known as:  BACTRIM DS  Take 1 tablet by mouth 2 (two) times daily. for 9 days  What changed:  how much to take        CONTINUE taking these medications    aspirin 325 MG tablet  Take 1 tablet (325 mg  total) by mouth once daily.     atorvastatin 10 MG tablet  Commonly known as:  LIPITOR  Take 1 tablet (10 mg total) by mouth once daily.     baclofen 5 mg Tab tablet  Commonly known as:  LIORESAL  Take 1 tablet (5 mg total) by mouth 3 (three) times daily as needed.     cloNIDine 0.1 mg/24 hr td ptwk 0.1 mg/24 hr  Commonly known as:  CATAPRES  Place 1 patch onto the skin every 7 days.     divalproex 500 MG Tbec  Commonly known as:  DEPAKOTE  Take 1 tablet (500 mg total) by mouth every 12 (twelve) hours.     DULoxetine 60 MG capsule  Commonly known as:  CYMBALTA  Take 1 capsule (60 mg total) by mouth once daily.     mirtazapine 15 MG tablet  Commonly known as:  REMERON  Take 1 tablet (15 mg total) by mouth every evening.     pregabalin 200 MG Cap  Commonly known as:  LYRICA  Take 200 mg by mouth 3 (three) times daily.        STOP taking these medications    buPROPion 300 MG 24 hr tablet  Commonly known as:  WELLBUTRIN XL     ibuprofen 600 MG tablet  Commonly known as:  ADVIL,MOTRIN            Indwelling Lines/Drains at time of discharge:   Lines/Drains/Airways     None                 Time spent on the discharge of patient: 45 minutes  Patient was seen and examined on the date of discharge and determined to be suitable for discharge.         Joshua Negro MD  Department of Hospital Medicine  [facility]

## 2020-02-18 ENCOUNTER — TELEPHONE (OUTPATIENT)
Dept: PODIATRY | Facility: CLINIC | Age: 45
End: 2020-02-18

## 2020-02-18 PROCEDURE — G0180 MD CERTIFICATION HHA PATIENT: HCPCS | Mod: ,,, | Performed by: INTERNAL MEDICINE

## 2020-02-18 PROCEDURE — G0180 PR HOME HEALTH MD CERTIFICATION: ICD-10-PCS | Mod: ,,, | Performed by: INTERNAL MEDICINE

## 2020-02-18 NOTE — TELEPHONE ENCOUNTER
Call placed to preferred number listed in the patient chart concerning an entered wound care referral.  No answer.  Message left on recorder concerning the nature of my call and contact info.

## 2020-02-18 NOTE — PLAN OF CARE
02/18/20 0745   Final Note   Assessment Type Final Discharge Note     Patient discharged home with Anupam MURPHY on 2/17/2020. Discharge summary faxed to Dr. Koko Stockton (PCP) f [phone] & [employer] [facility].

## 2020-02-19 ENCOUNTER — TELEPHONE (OUTPATIENT)
Dept: WOUND CARE | Facility: CLINIC | Age: 45
End: 2020-02-19

## 2020-02-19 NOTE — TELEPHONE ENCOUNTER
----- Message from Lalitha Reddy sent at 2/19/2020  9:35 AM CST -----  Contact: Teresa Gegier Case Mag with [employer] @[phone] ext [phone]  Called in to inform staff that pt needs wound care changes daily. Also requesting a  consult for transportation issues and drug use and if welbutraine should be continued.

## 2020-02-27 ENCOUNTER — EXTERNAL HOME HEALTH (OUTPATIENT)
Dept: HOME HEALTH SERVICES | Facility: HOSPITAL | Age: 45
End: 2020-02-27

## 2020-03-31 ENCOUNTER — EXTERNAL HOME HEALTH (OUTPATIENT)
Dept: HOME HEALTH SERVICES | Facility: HOSPITAL | Age: 45
End: 2020-03-31

## 2020-04-15 RX ORDER — BUPROPION HYDROCHLORIDE 300 MG/1
TABLET ORAL
Qty: 30 TABLET | Refills: 0 | OUTPATIENT
Start: 2020-04-15

## 2020-04-15 RX ORDER — DULOXETIN HYDROCHLORIDE 60 MG/1
CAPSULE, DELAYED RELEASE ORAL
Qty: 30 CAPSULE | Refills: 0 | OUTPATIENT
Start: 2020-04-15

## 2020-05-14 ENCOUNTER — DOCUMENT SCAN (OUTPATIENT)
Dept: HOME HEALTH SERVICES | Facility: HOSPITAL | Age: 45
End: 2020-05-14
Payer: COMMERCIAL

## 2020-05-14 ENCOUNTER — DOCUMENT SCAN (OUTPATIENT)
Dept: HOME HEALTH SERVICES | Facility: HOSPITAL | Age: 45
End: 2020-05-14

## 2020-05-27 ENCOUNTER — DOCUMENT SCAN (OUTPATIENT)
Dept: HOME HEALTH SERVICES | Facility: HOSPITAL | Age: 45
End: 2020-05-27
Payer: COMMERCIAL

## 2020-06-16 ENCOUNTER — EXTERNAL HOME HEALTH (OUTPATIENT)
Dept: HOME HEALTH SERVICES | Facility: HOSPITAL | Age: 45
End: 2020-06-16
Payer: COMMERCIAL

## 2020-06-16 NOTE — PROGRESS NOTES
SOC Order # [id number]  Cert Period: 02/18 to 04/17/2020 with [facility] - Dr. Ruthie Ramirez

## 2020-06-17 ENCOUNTER — EXTERNAL HOME HEALTH (OUTPATIENT)
Dept: HOME HEALTH SERVICES | Facility: HOSPITAL | Age: 45
End: 2020-06-17
Payer: COMMERCIAL

## 2020-08-18 ENCOUNTER — TELEPHONE (OUTPATIENT)
Dept: FAMILY MEDICINE | Facility: CLINIC | Age: 45
End: 2020-08-18

## 2020-08-18 NOTE — TELEPHONE ENCOUNTER
----- Message from Mary Brown sent at 8/18/2020  4:15 PM CDT -----  Patient called to speak with the doctor concerning an appointment he insists that he has on this Thursday at 10am.    He would like a callback at [phone]    Thanks  KB

## 2020-08-18 NOTE — TELEPHONE ENCOUNTER
Patient called to speak with the doctor concerning an appointment he insists that he has on this Thursday at 10am.     He would like a callback at [phone]  Spoke with patient. Appointment has been scheduled.

## 2021-04-08 ENCOUNTER — OFFICE VISIT (OUTPATIENT)
Dept: PODIATRY | Facility: CLINIC | Age: 46
End: 2021-04-08
Payer: MEDICAID

## 2021-04-08 VITALS — HEIGHT: 71 IN | WEIGHT: 224.13 LBS | BODY MASS INDEX: 31.38 KG/M2

## 2021-04-08 DIAGNOSIS — R23.4 FISSURE IN SKIN OF BOTH FEET: ICD-10-CM

## 2021-04-08 DIAGNOSIS — L85.3 XEROSIS CUTIS: Primary | ICD-10-CM

## 2021-04-08 DIAGNOSIS — L84 CALLUS: ICD-10-CM

## 2021-04-08 PROCEDURE — 99203 OFFICE O/P NEW LOW 30 MIN: CPT | Mod: S$PBB,,, | Performed by: PODIATRIST

## 2021-04-08 PROCEDURE — 99999 PR PBB SHADOW E&M-EST. PATIENT-LVL III: CPT | Mod: PBBFAC,,, | Performed by: PODIATRIST

## 2021-04-08 PROCEDURE — 99999 PR PBB SHADOW E&M-EST. PATIENT-LVL III: ICD-10-PCS | Mod: PBBFAC,,, | Performed by: PODIATRIST

## 2021-04-08 PROCEDURE — 99213 OFFICE O/P EST LOW 20 MIN: CPT | Mod: PBBFAC,PO | Performed by: PODIATRIST

## 2021-04-08 PROCEDURE — 99203 PR OFFICE/OUTPT VISIT, NEW, LEVL III, 30-44 MIN: ICD-10-PCS | Mod: S$PBB,,, | Performed by: PODIATRIST

## 2021-04-08 RX ORDER — BUPROPION HYDROCHLORIDE 300 MG/1
TABLET ORAL
COMMUNITY

## 2021-04-08 RX ORDER — MIRTAZAPINE 15 MG/1
TABLET, FILM COATED ORAL
COMMUNITY

## 2021-04-08 RX ORDER — TERBINAFINE HYDROCHLORIDE 250 MG/1
250 TABLET ORAL DAILY
COMMUNITY
Start: 2021-03-11

## 2021-04-08 RX ORDER — FLUTICASONE PROPIONATE 50 MCG
2 SPRAY, SUSPENSION (ML) NASAL
COMMUNITY
Start: 2021-03-30

## 2021-04-08 RX ORDER — CLOBETASOL PROPIONATE 0.5 MG/G
CREAM TOPICAL
COMMUNITY
Start: 2021-03-25

## 2021-04-08 RX ORDER — IBUPROFEN 800 MG/1
TABLET ORAL
COMMUNITY
Start: 2021-03-24

## 2021-04-08 RX ORDER — DULOXETIN HYDROCHLORIDE 60 MG/1
CAPSULE, DELAYED RELEASE ORAL
COMMUNITY

## 2021-04-08 RX ORDER — BACLOFEN 20 MG/1
20 TABLET ORAL 3 TIMES DAILY
COMMUNITY
Start: 2021-03-31

## 2021-04-08 RX ORDER — METOPROLOL SUCCINATE 50 MG/1
50 TABLET, EXTENDED RELEASE ORAL
COMMUNITY

## 2021-05-05 ENCOUNTER — TELEPHONE (OUTPATIENT)
Dept: DERMATOLOGY | Facility: CLINIC | Age: 46
End: 2021-05-05

## 2021-05-12 DIAGNOSIS — M54.42 CHRONIC LOW BACK PAIN WITH BILATERAL SCIATICA: Primary | ICD-10-CM

## 2021-05-12 DIAGNOSIS — G89.29 CHRONIC LOW BACK PAIN WITH BILATERAL SCIATICA: Primary | ICD-10-CM

## 2021-05-12 DIAGNOSIS — M54.41 CHRONIC LOW BACK PAIN WITH BILATERAL SCIATICA: Primary | ICD-10-CM

## 2021-05-31 NOTE — ASSESSMENT & PLAN NOTE
Intake: po 600 ml and Output  Urine  2200 ml     Chris Buckner, SARA  05/31/21 7054 Patient given multiple medications by ED staff for headache including, Fioricet, dexamethasone, benadryl, remeron, and ketorolac  Continue fioricet and tylenol
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
## 2023-10-04 PROBLEM — I28.0 ARTERIOVENOUS FISTULA OF PULMONARY VESSELS: Status: ACTIVE | Noted: 2020-01-25

## 2023-11-28 NOTE — HPI
Mr. Coley is a 43 y/o male with fibromyalgia, alcohol and substance abuse, suicidal ideation, transaminitis, bipolar disorder, presents to ED intoxicated and notable for HA. Imaging studies demonstrate a right parietal infarct. He was admitted for vascular neurology evaluation. He was noted to have large PFO vs small ASD on TTE and plans to see Dr. Damico outpatient. We are consulted as he had a wound on his scalp that grew MRSA. He is currently on bactrim. No fever chills or night sweats. Family reports hx of recurrent MRSA infections.    No

## 2024-01-01 NOTE — ASSESSMENT & PLAN NOTE
History of suicidal ideations  Will monitor   Continue home psychiatric medications   Patient's FSGs are controlled on current medication regimen.  Last A1c reviewed-   Lab Results   Component Value Date    HGBA1C >14.0 (H) 12/01/2021     Most recent fingerstick glucose reviewed-   Recent Labs   Lab 12/31/23  1430   POCTGLUCOSE 243*     Current correctional scale  Low  Maintain anti-hyperglycemic dose as follows-   Antihyperglycemics (From admission, onward)      Start     Stop Route Frequency Ordered    12/31/23 1901  insulin aspart U-100 pen 0-5 Units         -- SubQ Before meals & nightly PRN 12/31/23 1801          Hold Oral hypoglycemics while patient is in the hospital.

## 2024-06-27 DIAGNOSIS — M25.569 KNEE PAIN, UNSPECIFIED CHRONICITY, UNSPECIFIED LATERALITY: Primary | ICD-10-CM

## 2024-09-20 ENCOUNTER — PATIENT OUTREACH (OUTPATIENT)
Dept: ADMINISTRATIVE | Facility: OTHER | Age: 49
End: 2024-09-20
Payer: MEDICAID

## 2024-09-20 NOTE — PROGRESS NOTES
CHW - Initial Contact    This Community Health Worker completed the Social Determinant of Health questionnaire with patient via telephone today.    Pt identified barriers of most importance are: No barriers reported.   Referrals to community agencies completed with patient/caregiver consent outside of [facility] include: No.  Referrals were put through [facility] - no:   Support and Services: No.  Other information discussed the patient needs / wants help with: SDOH completed. Patient stated he will have to cancel visit on Monday 9/23. Patient stated he will call back to r/s. Patient did not report any other barriers at this time, case will be closed.    Follow up required: No.

## 2024-11-15 NOTE — PLAN OF CARE
Problem: Fall Injury Risk  Goal: Absence of Fall and Fall-Related Injury  Outcome: Ongoing, Progressing     Problem: Adjustment to Illness (Stroke, Ischemic/Transient Ischemic Attack)  Goal: Optimal Coping  Outcome: Ongoing, Progressing      

## 2024-11-17 ENCOUNTER — ON-DEMAND VIRTUAL (OUTPATIENT)
Dept: URGENT CARE | Facility: CLINIC | Age: 49
End: 2024-11-17
Payer: MEDICAID

## 2024-11-17 DIAGNOSIS — R11.2 NAUSEA AND VOMITING, UNSPECIFIED VOMITING TYPE: Primary | ICD-10-CM
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
## 2025-01-02 ENCOUNTER — HOSPITAL ENCOUNTER (EMERGENCY)
Facility: OTHER | Age: 50
Discharge: HOME | End: 2025-01-02
Attending: EMERGENCY MEDICINE
Payer: MEDICAID

## 2025-01-02 VITALS
TEMPERATURE: 98 F | OXYGEN SATURATION: 99 % | HEART RATE: 85 BPM | RESPIRATION RATE: 16 BRPM | SYSTOLIC BLOOD PRESSURE: 126 MMHG | DIASTOLIC BLOOD PRESSURE: 76 MMHG

## 2025-01-02 DIAGNOSIS — S61.209A: Primary | ICD-10-CM

## 2025-01-02 PROCEDURE — 99285 EMERGENCY DEPT VISIT HI MDM: CPT | Mod: 25

## 2025-01-02 PROCEDURE — 96375 TX/PRO/DX INJ NEW DRUG ADDON: CPT

## 2025-01-02 PROCEDURE — 63600175 PHARM REV CODE 636 W HCPCS: Performed by: NURSE PRACTITIONER

## 2025-01-02 PROCEDURE — 25000003 PHARM REV CODE 250: Performed by: NURSE PRACTITIONER

## 2025-01-02 PROCEDURE — 12001 RPR S/N/AX/GEN/TRNK 2.5CM/<: CPT

## 2025-01-02 PROCEDURE — 96374 THER/PROPH/DIAG INJ IV PUSH: CPT

## 2025-01-02 RX ORDER — HYDROCODONE BITARTRATE AND ACETAMINOPHEN 5; 325 MG/1; MG/1
1 TABLET ORAL
Status: DISCONTINUED | OUTPATIENT
Start: 2025-01-02 | End: 2025-01-02 | Stop reason: HOSPADM

## 2025-01-02 RX ORDER — CEFAZOLIN SODIUM 1 G/3ML
1 INJECTION, POWDER, FOR SOLUTION INTRAMUSCULAR; INTRAVENOUS
Status: COMPLETED | OUTPATIENT
Start: 2025-01-02 | End: 2025-01-02

## 2025-01-02 RX ORDER — KETOROLAC TROMETHAMINE 30 MG/ML
30 INJECTION, SOLUTION INTRAMUSCULAR; INTRAVENOUS
Status: COMPLETED | OUTPATIENT
Start: 2025-01-02 | End: 2025-01-02

## 2025-01-02 RX ORDER — BUPIVACAINE HYDROCHLORIDE 2.5 MG/ML
5 INJECTION, SOLUTION EPIDURAL; INFILTRATION; INTRACAUDAL
Status: COMPLETED | OUTPATIENT
Start: 2025-01-02 | End: 2025-01-02

## 2025-01-02 RX ORDER — DOXYCYCLINE HYCLATE 100 MG
100 TABLET ORAL
Status: COMPLETED | OUTPATIENT
Start: 2025-01-02 | End: 2025-01-02

## 2025-01-02 RX ORDER — CEPHALEXIN 500 MG/1
500 CAPSULE ORAL 3 TIMES DAILY
Qty: 20 CAPSULE | Refills: 0 | Status: SHIPPED | OUTPATIENT
Start: 2025-01-02 | End: 2025-01-09

## 2025-01-02 RX ORDER — LIDOCAINE HYDROCHLORIDE 10 MG/ML
10 INJECTION, SOLUTION INFILTRATION; PERINEURAL
Status: COMPLETED | OUTPATIENT
Start: 2025-01-02 | End: 2025-01-02

## 2025-01-02 RX ORDER — KETOROLAC TROMETHAMINE 10 MG/1
10 TABLET, FILM COATED ORAL EVERY 6 HOURS
Qty: 15 TABLET | Refills: 0 | Status: SHIPPED | OUTPATIENT
Start: 2025-01-02

## 2025-01-02 RX ADMIN — BUPIVACAINE HYDROCHLORIDE 12.5 MG: 2.5 INJECTION, SOLUTION EPIDURAL; INFILTRATION; INTRACAUDAL; PERINEURAL at 02:01

## 2025-01-02 RX ADMIN — LIDOCAINE HYDROCHLORIDE 10 ML: 10 INJECTION, SOLUTION INFILTRATION; PERINEURAL at 01:01

## 2025-01-02 RX ADMIN — KETOROLAC TROMETHAMINE 30 MG: 30 INJECTION, SOLUTION INTRAMUSCULAR; INTRAVENOUS at 03:01

## 2025-01-02 RX ADMIN — DOXYCYCLINE HYCLATE 100 MG: 100 TABLET, COATED ORAL at 04:01

## 2025-01-02 RX ADMIN — CEFAZOLIN 1 G: 330 INJECTION, POWDER, FOR SOLUTION INTRAMUSCULAR; INTRAVENOUS at 04:01
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
## 2025-01-07 ENCOUNTER — OFFICE VISIT (OUTPATIENT)
Dept: URGENT CARE | Facility: CLINIC | Age: 50
End: 2025-01-07
Payer: OTHER MISCELLANEOUS

## 2025-01-07 ENCOUNTER — OFFICE VISIT (OUTPATIENT)
Dept: URGENT CARE | Facility: CLINIC | Age: 50
End: 2025-01-07
Payer: MEDICAID

## 2025-01-07 VITALS
HEART RATE: 90 BPM | SYSTOLIC BLOOD PRESSURE: 152 MMHG | RESPIRATION RATE: 16 BRPM | TEMPERATURE: 98 F | DIASTOLIC BLOOD PRESSURE: 88 MMHG | OXYGEN SATURATION: 99 %

## 2025-01-07 VITALS
BODY MASS INDEX: 31.36 KG/M2 | RESPIRATION RATE: 18 BRPM | WEIGHT: 224 LBS | TEMPERATURE: 98 F | HEIGHT: 71 IN | HEART RATE: 90 BPM | SYSTOLIC BLOOD PRESSURE: 152 MMHG | OXYGEN SATURATION: 99 % | DIASTOLIC BLOOD PRESSURE: 88 MMHG

## 2025-01-07 DIAGNOSIS — Z02.6 ENCOUNTER RELATED TO WORKER'S COMPENSATION CLAIM: Primary | ICD-10-CM

## 2025-01-07 DIAGNOSIS — H66.91 RIGHT OTITIS MEDIA, UNSPECIFIED OTITIS MEDIA TYPE: Primary | ICD-10-CM

## 2025-01-07 DIAGNOSIS — R05.1 ACUTE COUGH: ICD-10-CM

## 2025-01-07 DIAGNOSIS — H92.01 RIGHT EAR PAIN: ICD-10-CM

## 2025-01-07 DIAGNOSIS — R09.81 SINUS CONGESTION: ICD-10-CM

## 2025-01-07 DIAGNOSIS — S61.209A DEGLOVING INJURY OF FINGER, INITIAL ENCOUNTER: ICD-10-CM

## 2025-01-07 DIAGNOSIS — M79.641 PAIN OF RIGHT HAND: ICD-10-CM

## 2025-01-07 RX ORDER — FLUTICASONE PROPIONATE 50 MCG
1 SPRAY, SUSPENSION (ML) NASAL DAILY
Qty: 15.8 ML | Refills: 0 | Status: SHIPPED | OUTPATIENT
Start: 2025-01-07

## 2025-01-07 RX ORDER — CETIRIZINE HYDROCHLORIDE 10 MG/1
10 TABLET ORAL DAILY
Qty: 30 TABLET | Refills: 0 | Status: SHIPPED | OUTPATIENT
Start: 2025-01-07

## 2025-01-07 RX ORDER — AMOXICILLIN AND CLAVULANATE POTASSIUM 875; 125 MG/1; MG/1
1 TABLET, FILM COATED ORAL EVERY 12 HOURS
Qty: 10 TABLET | Refills: 0 | Status: SHIPPED | OUTPATIENT
Start: 2025-01-07 | End: 2025-01-12

## 2025-01-07 NOTE — PATIENT INSTRUCTIONS
Follow up in 5 days for re-evaluation and suture removal unless you have an appointment with hand surgery already    Please follow up sooner if you have any further concerns or questions

## 2025-01-07 NOTE — PROGRESS NOTES
Subjective:      Patient ID: Luke Coley is a 49 y.o. male.    Chief Complaint: Hand Pain    DOI: 01/02/2025 Pt states: pt finger got caught finger on ladder from truck after ring finger was degloved from wedding band. Was seen at hospital. Pt was xrayed no fx apparent.  There are several sutures in place from the ER visit.  Ring was cut off in the ER.  Patient reports he has not yet her from hand surgery.  We will replace referral today.  Tissue around wound appears macerated.  Discussed with patient leaving it open to air and applying less ointment.  Sutures were only placed 5 days ago and need more time before being removed.  Patient will return in 5 days for re-evaluation    Hand Pain   His dominant hand is their right hand. The pain is at a severity of 4/10. Associated symptoms include numbness.     Constitution: Negative for chills, fatigue and fever.   Musculoskeletal:  Positive for pain and trauma (partial degloving of right ring finger).   Skin:  Positive for laceration.   Neurological:  Positive for numbness.     Objective:     Physical Exam  Vitals and nursing note reviewed.   Constitutional:       General: He is not in acute distress.     Appearance: Normal appearance. He is normal weight.   HENT:      Head: Normocephalic and atraumatic.      Right Ear: External ear normal.      Left Ear: External ear normal.      Nose: Nose normal.      Mouth/Throat:      Mouth: Mucous membranes are moist.   Eyes:      Conjunctiva/sclera: Conjunctivae normal.   Cardiovascular:      Rate and Rhythm: Normal rate.   Pulmonary:      Effort: Pulmonary effort is normal. No respiratory distress.   Musculoskeletal:         General: Normal range of motion.   Skin:     General: Skin is warm and dry.      Capillary Refill: Capillary refill takes more than 3 seconds.      Findings: Wound present.          Neurological:      Mental Status: He is alert and oriented to person, place, and time.      Motor: No weakness.    Psychiatric:         Mood and Affect: Mood normal.         Behavior: Behavior normal.         Thought Content: Thought content normal.         Judgment: Judgment normal.                    Assessment:      1. Encounter related to worker's compensation claim    2. Degloving injury of finger, initial encounter    3. Pain of right hand      Plan:     Referral sent for hand surgery for re-evaluation.  Restrictions as listed below  Patient to follow up in clinic in five days unless he has not appointment with hand surgery.  For re-evaluation and suture removal.  Wound redressed with nonstick and 4 x 4 gauze.  Left dry with no mupirocin ointment.       Patient Instructions: Keep dressing clean/dry/covered, Attention not to aggravate affected area, Do not apply ointments or creams unless directed   Restrictions: No lifting/pushing/pulling more than 10 lbs, Limited use of right hand and arm (Work as tolerated, limited use of right hand)  Follow up in about 5 days (around 1/12/2025), or if symptoms worsen or fail to improve.

## 2025-01-07 NOTE — PROGRESS NOTES
Subjective:      Patient ID: Luke Coley is a 49 y.o. male.    Vitals:  temperature is 98.2 °F (36.8 °C). His blood pressure is 152/88 (abnormal) and his pulse is 90. His respiration is 16 and oxygen saturation is 99%.     Chief Complaint: Otalgia    Patient reports right ear pain that has been worsening over the last several days.  He has had upper respiratory cold and has been unable to clear his right ear.  Denies fever, shortness of breath difficulty breathing.  Also has cough and sinus congestion.     Otalgia   Associated symptoms include coughing.       Constitution: Negative for chills, fatigue and fever.   HENT:  Positive for ear pain and congestion.    Respiratory:  Positive for cough and sputum production.       Objective:     Physical Exam   Constitutional: He is oriented to person, place, and time. He appears well-developed. He is cooperative.  Non-toxic appearance. He does not appear ill. No distress.   HENT:   Head: Normocephalic and atraumatic.   Ears:   Right Ear: Hearing, external ear and ear canal normal. There is tenderness. Tympanic membrane is erythematous and bulging. A middle ear effusion is present.   Left Ear: Hearing, tympanic membrane, external ear and ear canal normal.   Nose: Rhinorrhea and congestion present. No mucosal edema or nasal deformity. No epistaxis. Right sinus exhibits no maxillary sinus tenderness and no frontal sinus tenderness. Left sinus exhibits no maxillary sinus tenderness and no frontal sinus tenderness.   Mouth/Throat: Uvula is midline, oropharynx is clear and moist and mucous membranes are normal. Mucous membranes are moist. No trismus in the jaw. Normal dentition. No uvula swelling. No posterior oropharyngeal edema.   Eyes: Conjunctivae and lids are normal. No scleral icterus.   Neck: Trachea normal and phonation normal. Neck supple. No edema present. No erythema present. No neck rigidity present.   Cardiovascular: Normal rate.   Pulmonary/Chest: Effort  normal. No respiratory distress. He has no decreased breath sounds.   Abdominal: Normal appearance.   Musculoskeletal: Normal range of motion.         General: No deformity. Normal range of motion.   Neurological: He is alert and oriented to person, place, and time. He displays no weakness. He exhibits normal muscle tone.   Skin: Skin is warm, dry, intact, not diaphoretic and not pale.   Psychiatric: His speech is normal and behavior is normal. Mood, judgment and thought content normal.   Nursing note and vitals reviewed.      Assessment:     1. Right otitis media, unspecified otitis media type    2. Right ear pain    3. Acute cough    4. Sinus congestion        Plan:       Right otitis media, unspecified otitis media type  -     amoxicillin-clavulanate 875-125mg (AUGMENTIN) 875-125 mg per tablet; Take 1 tablet by mouth every 12 (twelve) hours. for 5 days  Dispense: 10 tablet; Refill: 0    Right ear pain    Acute cough    Sinus congestion  -     fluticasone propionate (FLONASE) 50 mcg/actuation nasal spray; 1 spray (50 mcg total) by Each Nostril route once daily.  Dispense: 15.8 mL; Refill: 0  -     cetirizine (ZYRTEC) 10 MG tablet; Take 1 tablet (10 mg total) by mouth once daily.  Dispense: 30 tablet; Refill: 0      Discussed medication with patient who acknowledges understanding and is agreeable to POC. Follow up with primary care. Increase fluid intake. Red flags for ER discussed.

## 2025-01-10 ENCOUNTER — OCCUPATIONAL HEALTH (OUTPATIENT)
Dept: URGENT CARE | Facility: CLINIC | Age: 50
End: 2025-01-10
Payer: MEDICAID

## 2025-01-10 VITALS
OXYGEN SATURATION: 98 % | RESPIRATION RATE: 20 BRPM | DIASTOLIC BLOOD PRESSURE: 79 MMHG | BODY MASS INDEX: 31.78 KG/M2 | WEIGHT: 222 LBS | SYSTOLIC BLOOD PRESSURE: 123 MMHG | TEMPERATURE: 99 F | HEART RATE: 75 BPM | HEIGHT: 70 IN

## 2025-01-10 DIAGNOSIS — L03.90 CELLULITIS, UNSPECIFIED CELLULITIS SITE: ICD-10-CM

## 2025-01-10 DIAGNOSIS — S61.209A: Primary | ICD-10-CM

## 2025-01-10 NOTE — PROGRESS NOTES
"Subjective:      Patient ID: Luke Coley is a 49 y.o. male.    Chief Complaint: Work Related Visit    48yo afebrile male here for WC follow up. He sustained a degloving injury of the right 4th finger on 01/02/24. He was seen and treated in the ER for same. He reports that is currently on Cephalexin. He was seen here on 01/07/24 for evaluation and follow up and was instructed to return on 01/12/24 for re-evaluation for removal of sutures placed on 01/02/24 in the ER.The patient currently has pink Coban covering wrist and fingers in a "boxer's wrap" fashion. The Coban is noted to be wrapped very tightly. An indention/impression from the Coban is noted on the skin after removal. The right 4th finger is noted to be swollen and erythematous. The wound itself is noted to be macerated. The patient reports severe pain of the affected finger. He is unable to flex the right 4th finger. He explains that the pain extends into the MCP joint of the 4th finger where he also reports severe pain.        Musculoskeletal:  Positive for pain and joint swelling.   Skin:  Positive for wound and erythema.     Objective:     Physical Exam  Vitals reviewed.   Constitutional:       General: He is not in acute distress.     Appearance: Normal appearance. He is normal weight. He is not ill-appearing, toxic-appearing or diaphoretic.   HENT:      Head: Normocephalic and atraumatic.   Cardiovascular:      Rate and Rhythm: Normal rate and regular rhythm.      Pulses: Normal pulses.      Heart sounds: Normal heart sounds.   Pulmonary:      Effort: Pulmonary effort is normal.      Breath sounds: Normal breath sounds.   Musculoskeletal:         General: Swelling and tenderness present.      Cervical back: Normal range of motion.      Comments: The right 4th finger is noted to be swollen and erythematous. The wound itself is noted to be macerated. The patient reports severe pain of the affected finger. He is unable to flex the right 4th " EXAM DESCRIPTION:  RAD - Hand Right 3 View - 10/9/2018 6:36 pm

 

CLINICAL HISTORY:  Right hand pain status post injury

 

FINDINGS:  No fracture or dislocation is seen.

 

   If the patient continues have symptoms to suggest an occult fracture then a followup plain film se
stanislaw in 7 days would be recommended finger. He explains that the pain extends into the MCP joint of the 4th finger where he also reports severe pain.     Skin:     Capillary Refill: Capillary refill takes less than 2 seconds.      Findings: Erythema present.      Comments: The right 4th finger is noted to be swollen and erythematous. The wound itself is noted to be macerated. The patient reports severe pain of the affected finger. He is unable to flex the right 4th finger. He explains that the pain extends into the MCP joint of the 4th finger where he also reports severe pain.     Neurological:      Mental Status: He is alert and oriented to person, place, and time.   Psychiatric:         Behavior: Behavior normal.        Assessment:      1. Ring avulsion injury of finger of right hand    2. Cellulitis, unspecified cellulitis site      Plan:     Patient was instructed to go to the ER now for further treatment and evaluation.   0 n/a

## 2025-01-13 ENCOUNTER — TELEPHONE (OUTPATIENT)
Dept: ORTHOPEDICS | Facility: CLINIC | Age: 50
End: 2025-01-13
Payer: MEDICAID

## 2025-02-09 NOTE — ASSESSMENT & PLAN NOTE
-  Psych following   EMERGENCY DEPARTMENT ENCOUNTER    Pt Name: Garcia Mejia  MRN: [id number]  Birthdate 1964  Date of evaluation: 2/9/25  CHIEF COMPLAINT       Chief Complaint   Patient presents with    Fall    Knee Pain     Fell on ice straight down on his right knee.      HISTORY OF PRESENT ILLNESS   HPI  60-year-old male with history of autism, hyperlipidemia presents to the ED for right knee pain since a fall earlier this evening.  Patient slipped on ice, landed on his right knee.  Since then he has had a bruise to his medial right knee, pain.  He is able to walk, he denies weakness/numbness.  He denies head trauma or LOC, he does not take blood thinners.    REVIEW OF SYSTEMS     Review of Systems   All other systems reviewed and are negative.    PASTMEDICAL HISTORY     Past Medical History:   Diagnosis Date    Acid reflux     Autistic disorder     Hyperlipidemia      SURGICAL HISTORY     History reviewed. No pertinent surgical history.  CURRENT MEDICATIONS       Discharge Medication List as of 2/8/2025 11:13 PM        CONTINUE these medications which have NOT CHANGED    Details   polyethylene glycol (GLYCOLAX) 17 GM/SCOOP powder Take 17 g by mouth dailyHistorical Med      fluvoxaMINE (LUVOX) 100 MG tablet Take 100 mg by mouth nightlyHistorical Med      omeprazole (PRILOSEC) 20 MG delayed release capsule Take 20 mg by mouth dailyHistorical Med      atorvastatin (LIPITOR) 10 MG tablet Take 20 mg by mouth nightly Historical Med           ALLERGIES     is allergic to pcn [penicillins] and potassium-containing compounds.  FAMILY HISTORY     has no family status information on file.      SOCIAL HISTORY       Social History     Tobacco Use    Smoking status: Never    Smokeless tobacco: Never   Substance Use Topics    Alcohol use: No    Drug use: No     PHYSICAL EXAM     INITIAL VITALS: /75   Pulse (!) 102   Temp 98.4 °F (36.9 °C) (Oral)   Resp 18   Ht 1.829 m (6')   Wt 111.6 kg (246 lb)   SpO2 94%   BMI 33.36 kg/m²

## 2025-02-15 ENCOUNTER — ON-DEMAND VIRTUAL (OUTPATIENT)
Dept: URGENT CARE | Facility: CLINIC | Age: 50
End: 2025-02-15
Payer: MEDICAID

## 2025-02-15 DIAGNOSIS — R11.2 NAUSEA AND VOMITING, UNSPECIFIED VOMITING TYPE: ICD-10-CM

## 2025-02-15 DIAGNOSIS — J11.1 INFLUENZA: Primary | ICD-10-CM

## 2025-02-15 DIAGNOSIS — R05.9 COUGH, UNSPECIFIED TYPE: ICD-10-CM

## 2025-02-15 RX ORDER — PROMETHAZINE HYDROCHLORIDE AND DEXTROMETHORPHAN HYDROBROMIDE 6.25; 15 MG/5ML; MG/5ML
5 SYRUP ORAL 3 TIMES DAILY PRN
Qty: 118 ML | Refills: 0 | Status: SHIPPED | OUTPATIENT
Start: 2025-02-15 | End: 2025-02-24

## 2025-02-15 RX ORDER — OSELTAMIVIR PHOSPHATE 75 MG/1
75 CAPSULE ORAL 2 TIMES DAILY
Qty: 10 CAPSULE | Refills: 0 | Status: SHIPPED | OUTPATIENT
Start: 2025-02-15 | End: 2025-02-20

## 2025-02-15 RX ORDER — ONDANSETRON 4 MG/1
4 TABLET, ORALLY DISINTEGRATING ORAL EVERY 8 HOURS PRN
Qty: 9 TABLET | Refills: 0 | Status: SHIPPED | OUTPATIENT
Start: 2025-02-15 | End: 2025-02-18

## 2025-02-15 NOTE — PATIENT INSTRUCTIONS
Increase fluids and rest  Pedialyte, powerade, gatorade  Take meds as directed  Warm salt water gargles, tea with honey, chlorseptic spray, throat lozenges   Limit foods that are salty, spicy food, limit carbonated drinks, limit acidic drinks  Tylenol or Motrin as directed for fever or body aches  Continue antihistamine (zyrtec or Claritin), Flonase and saline nasal spray  Okay to take Robitussin DM, Mucinex DM, Dayquil/Nyquil as directed    If increased Shortness of breath or difficulty breathing go to the Urgent Care or ER  If symptoms worsening or not improved f/u in Urgent Care or with PCP        We appreciate you trusting us with your medical care. We hope you feel better soon. We will be happy to take care of you for all of your future medical needs.     You must understand that you've received Virtual treatment only and that you may be released before all your medical problems are known or treated. You, the patient, will arrange for follow up care as instructed.     Follow up with your PCP or specialty clinic as directed in the next 1-2 days if not improved or as needed. You can call [phone] to schedule an appointment with the appropriate provider.     If your condition worsens we recommend that you receive another evaluation in person, with your primary care provider, urgent care or at the emergency room immediately or contact your primary medical clinics after hours call service to discuss your concerns.
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
## 2025-02-15 NOTE — LETTER
February 15, 2025    Luke Coley  [address]  [place] [address]             Virtual Visit - Urgent Care  Urgent Care  [address] [facility] [address]   February 15, 2025     Patient: Luke Coley   YOB: 1975   Date of Visit: 2/15/2025       To Whom it May Concern:    Luke Coley was seen virtually on 2/15/2025. He may return to work on 2/18/2025 .    Please excuse him from any classes or work missed.    If you have any questions or concerns, please don't hesitate to call.    Sincerely,         Suma Noyola, NP

## 2025-02-18 DIAGNOSIS — R09.81 SINUS CONGESTION: ICD-10-CM

## 2025-03-13 RX ORDER — FLUTICASONE PROPIONATE 50 MCG
SPRAY, SUSPENSION (ML) NASAL
Qty: 16 G | OUTPATIENT
Start: 2025-03-13